# Patient Record
Sex: FEMALE | Race: WHITE | Employment: FULL TIME | URBAN - METROPOLITAN AREA
[De-identification: names, ages, dates, MRNs, and addresses within clinical notes are randomized per-mention and may not be internally consistent; named-entity substitution may affect disease eponyms.]

---

## 2017-01-03 ENCOUNTER — ALLSCRIPTS OFFICE VISIT (OUTPATIENT)
Dept: OTHER | Facility: OTHER | Age: 52
End: 2017-01-03

## 2017-01-20 RX ORDER — TRANEXAMIC ACID 650 1/1
TABLET ORAL
COMMUNITY
End: 2018-12-03 | Stop reason: ALTCHOICE

## 2017-01-20 RX ORDER — CHOLECALCIFEROL (VITAMIN D3) 50 MCG
2000 TABLET ORAL DAILY
COMMUNITY
End: 2018-12-03 | Stop reason: ALTCHOICE

## 2017-01-30 ENCOUNTER — HOSPITAL ENCOUNTER (OUTPATIENT)
Facility: HOSPITAL | Age: 52
Setting detail: OUTPATIENT SURGERY
Discharge: HOME | End: 2017-01-30
Attending: SURGERY | Admitting: SURGERY
Payer: OTHER GOVERNMENT

## 2017-01-30 ENCOUNTER — ALLSCRIPTS OFFICE VISIT (OUTPATIENT)
Dept: OTHER | Facility: OTHER | Age: 52
End: 2017-01-30

## 2017-01-30 VITALS
DIASTOLIC BLOOD PRESSURE: 56 MMHG | SYSTOLIC BLOOD PRESSURE: 110 MMHG | RESPIRATION RATE: 16 BRPM | HEIGHT: 70 IN | HEART RATE: 58 BPM | OXYGEN SATURATION: 100 % | BODY MASS INDEX: 22.19 KG/M2 | WEIGHT: 155 LBS | TEMPERATURE: 98.9 F

## 2017-01-30 DIAGNOSIS — Z12.31 ENCOUNTER FOR SCREENING MAMMOGRAM FOR MALIGNANT NEOPLASM OF BREAST: ICD-10-CM

## 2017-01-30 DIAGNOSIS — L98.9 DISORDER OF SKIN OR SUBCUTANEOUS TISSUE: ICD-10-CM

## 2017-01-30 DIAGNOSIS — D25.9 LEIOMYOMA OF UTERUS: ICD-10-CM

## 2017-01-30 DIAGNOSIS — Z01.419 ENCOUNTER FOR GYNECOLOGICAL EXAMINATION WITHOUT ABNORMAL FINDING: ICD-10-CM

## 2017-01-30 PROCEDURE — 88305 TISSUE EXAM BY PATHOLOGIST: CPT | Performed by: SURGERY

## 2017-01-30 RX ORDER — NAPROXEN 500 MG/1
500 TABLET ORAL 2 TIMES DAILY WITH MEALS
Qty: 60 TABLET | Refills: 0 | Status: SHIPPED | OUTPATIENT
Start: 2017-01-30 | End: 2018-12-03 | Stop reason: ALTCHOICE

## 2017-01-30 RX ORDER — LIDOCAINE HYDROCHLORIDE AND EPINEPHRINE 10; 10 MG/ML; UG/ML
20 INJECTION, SOLUTION INFILTRATION; PERINEURAL ONCE
Status: COMPLETED | OUTPATIENT
Start: 2017-01-30 | End: 2017-01-30

## 2017-02-07 ENCOUNTER — HOSPITAL ENCOUNTER (OUTPATIENT)
Dept: ULTRASOUND IMAGING | Facility: CLINIC | Age: 52
Discharge: HOME | End: 2017-02-07
Payer: OTHER GOVERNMENT

## 2017-02-07 ENCOUNTER — HOSPITAL ENCOUNTER (OUTPATIENT)
Dept: MAMMOGRAPHY | Facility: CLINIC | Age: 52
Discharge: HOME | End: 2017-02-07
Payer: OTHER GOVERNMENT

## 2017-02-07 DIAGNOSIS — D25.9 LEIOMYOMA OF UTERUS: ICD-10-CM

## 2017-02-07 DIAGNOSIS — Z12.31 ENCOUNTER FOR SCREENING MAMMOGRAM FOR MALIGNANT NEOPLASM OF BREAST: ICD-10-CM

## 2017-02-07 DIAGNOSIS — Z01.419 ENCOUNTER FOR GYNECOLOGICAL EXAMINATION WITHOUT ABNORMAL FINDING: ICD-10-CM

## 2017-02-07 PROCEDURE — 77063 BREAST TOMOSYNTHESIS BI: CPT

## 2017-02-07 PROCEDURE — 76830 TRANSVAGINAL US NON-OB: CPT

## 2017-02-07 PROCEDURE — G0202 SCR MAMMO BI INCL CAD: HCPCS

## 2017-02-07 PROCEDURE — 76856 US EXAM PELVIC COMPLETE: CPT

## 2017-02-09 ENCOUNTER — ALLSCRIPTS OFFICE VISIT (OUTPATIENT)
Dept: OTHER | Facility: OTHER | Age: 52
End: 2017-02-09

## 2017-05-11 ENCOUNTER — ALLSCRIPTS OFFICE VISIT (OUTPATIENT)
Dept: OTHER | Facility: OTHER | Age: 52
End: 2017-05-11

## 2017-06-08 ENCOUNTER — GENERIC CONVERSION - ENCOUNTER (OUTPATIENT)
Dept: OTHER | Facility: OTHER | Age: 52
End: 2017-06-08

## 2017-10-06 DIAGNOSIS — Z11.59 ENCOUNTER FOR SCREENING FOR OTHER VIRAL DISEASES (CODE): ICD-10-CM

## 2017-10-20 ENCOUNTER — APPOINTMENT (OUTPATIENT)
Dept: LAB | Facility: CLINIC | Age: 52
End: 2017-10-20
Payer: OTHER GOVERNMENT

## 2017-10-20 DIAGNOSIS — Z11.59 ENCOUNTER FOR SCREENING FOR OTHER VIRAL DISEASES (CODE): ICD-10-CM

## 2017-10-20 DIAGNOSIS — Z00.00 ENCOUNTER FOR GENERAL ADULT MEDICAL EXAMINATION WITHOUT ABNORMAL FINDINGS: ICD-10-CM

## 2017-10-20 LAB
BASOPHILS # BLD AUTO: 0.03 THOUSANDS/ΜL (ref 0–0.1)
BASOPHILS NFR BLD AUTO: 1 % (ref 0–1)
BILIRUB UR QL STRIP: NEGATIVE
CLARITY UR: CLEAR
COLOR UR: YELLOW
EOSINOPHIL # BLD AUTO: 0.11 THOUSAND/ΜL (ref 0–0.61)
EOSINOPHIL NFR BLD AUTO: 3 % (ref 0–6)
ERYTHROCYTE [DISTWIDTH] IN BLOOD BY AUTOMATED COUNT: 13.4 % (ref 11.6–15.1)
GLUCOSE UR STRIP-MCNC: NEGATIVE MG/DL
HCT VFR BLD AUTO: 37.6 % (ref 34.8–46.1)
HGB BLD-MCNC: 12.7 G/DL (ref 11.5–15.4)
HGB UR QL STRIP.AUTO: NEGATIVE
KETONES UR STRIP-MCNC: NEGATIVE MG/DL
LEUKOCYTE ESTERASE UR QL STRIP: NEGATIVE
LYMPHOCYTES # BLD AUTO: 0.81 THOUSANDS/ΜL (ref 0.6–4.47)
LYMPHOCYTES NFR BLD AUTO: 22 % (ref 14–44)
MCH RBC QN AUTO: 32.8 PG (ref 26.8–34.3)
MCHC RBC AUTO-ENTMCNC: 33.8 G/DL (ref 31.4–37.4)
MCV RBC AUTO: 97 FL (ref 82–98)
MONOCYTES # BLD AUTO: 0.3 THOUSAND/ΜL (ref 0.17–1.22)
MONOCYTES NFR BLD AUTO: 8 % (ref 4–12)
NEUTROPHILS # BLD AUTO: 2.35 THOUSANDS/ΜL (ref 1.85–7.62)
NEUTS SEG NFR BLD AUTO: 66 % (ref 43–75)
NITRITE UR QL STRIP: NEGATIVE
NRBC BLD AUTO-RTO: 0 /100 WBCS
PH UR STRIP.AUTO: 6.5 [PH] (ref 4.5–8)
PLATELET # BLD AUTO: 186 THOUSANDS/UL (ref 149–390)
PMV BLD AUTO: 10.9 FL (ref 8.9–12.7)
PROT UR STRIP-MCNC: NEGATIVE MG/DL
RBC # BLD AUTO: 3.87 MILLION/UL (ref 3.81–5.12)
SP GR UR STRIP.AUTO: 1 (ref 1–1.03)
UROBILINOGEN UR QL STRIP.AUTO: 0.2 E.U./DL
WBC # BLD AUTO: 3.61 THOUSAND/UL (ref 4.31–10.16)

## 2017-10-20 PROCEDURE — 82728 ASSAY OF FERRITIN: CPT

## 2017-10-20 PROCEDURE — 80053 COMPREHEN METABOLIC PANEL: CPT

## 2017-10-20 PROCEDURE — 80061 LIPID PANEL: CPT

## 2017-10-20 PROCEDURE — 84443 ASSAY THYROID STIM HORMONE: CPT

## 2017-10-20 PROCEDURE — 86803 HEPATITIS C AB TEST: CPT

## 2017-10-20 PROCEDURE — 83540 ASSAY OF IRON: CPT

## 2017-10-20 PROCEDURE — 81003 URINALYSIS AUTO W/O SCOPE: CPT

## 2017-10-20 PROCEDURE — 36415 COLL VENOUS BLD VENIPUNCTURE: CPT

## 2017-10-20 PROCEDURE — 85025 COMPLETE CBC W/AUTO DIFF WBC: CPT

## 2017-10-21 LAB
ALBUMIN SERPL BCP-MCNC: 3.7 G/DL (ref 3.5–5)
ALP SERPL-CCNC: 49 U/L (ref 46–116)
ALT SERPL W P-5'-P-CCNC: 21 U/L (ref 12–78)
ANION GAP SERPL CALCULATED.3IONS-SCNC: 7 MMOL/L (ref 4–13)
AST SERPL W P-5'-P-CCNC: 27 U/L (ref 5–45)
BILIRUB SERPL-MCNC: 0.27 MG/DL (ref 0.2–1)
BUN SERPL-MCNC: 7 MG/DL (ref 5–25)
CALCIUM SERPL-MCNC: 8.9 MG/DL (ref 8.3–10.1)
CHLORIDE SERPL-SCNC: 107 MMOL/L (ref 100–108)
CHOLEST SERPL-MCNC: 118 MG/DL (ref 50–200)
CO2 SERPL-SCNC: 28 MMOL/L (ref 21–32)
CREAT SERPL-MCNC: 0.73 MG/DL (ref 0.6–1.3)
FERRITIN SERPL-MCNC: 26 NG/ML (ref 8–388)
GFR SERPL CREATININE-BSD FRML MDRD: 94 ML/MIN/1.73SQ M
GLUCOSE P FAST SERPL-MCNC: 78 MG/DL (ref 65–99)
HDLC SERPL-MCNC: 43 MG/DL (ref 40–60)
IRON SERPL-MCNC: 132 UG/DL (ref 50–170)
LDLC SERPL CALC-MCNC: 69 MG/DL (ref 0–100)
POTASSIUM SERPL-SCNC: 4.2 MMOL/L (ref 3.5–5.3)
PROT SERPL-MCNC: 6.7 G/DL (ref 6.4–8.2)
SODIUM SERPL-SCNC: 142 MMOL/L (ref 136–145)
TRIGL SERPL-MCNC: 31 MG/DL
TSH SERPL DL<=0.05 MIU/L-ACNC: 1.31 UIU/ML (ref 0.36–3.74)

## 2017-10-22 LAB — HCV AB SER QL: NORMAL

## 2017-10-23 ENCOUNTER — ALLSCRIPTS OFFICE VISIT (OUTPATIENT)
Dept: OTHER | Facility: OTHER | Age: 52
End: 2017-10-23

## 2018-01-11 NOTE — PROGRESS NOTES
Assessment    1  Encounter for preventive health examination (V70 0) (Z00 00)    Plan  Health Maintenance    · Call [phone] if: You find a new or different kind of lump in your breast ;  Status:Complete;   Done: 84OAT3306   · Call [phone] if: You have any bleeding from the vagina ; Status:Complete;    Done: 16ARC9091   · Call [phone] if: You have any warning signs of skin cancer ; Status:Complete;    Done: 85CKT8657   · Call [phone] if: You experience a new kind of chest pain (angina) or pressure ;  Status:Complete;   Done: 26PXC6397   · Always use a seat belt and shoulder strap when riding or driving a motor vehicle ;  Status:Complete;   Done: 38STB8635   · Begin a limited exercise program ; Status:Complete;   Done: 93BPR9672   · Begin or continue regular aerobic exercise  Gradually work up to at least 3 sessions of 30  minutes of exercise a week ; Status:Complete;   Done: 94MAE2935   · Decreasing the stress in your life may help your condition improve ; Status:Complete;    Done: 23Oct2017   · Eat a low fat and low cholesterol diet ; Status:Complete;   Done: 94SSX2724   · Regular aerobic exercise can help reduce stress ; Status:Complete;   Done: 47QRX3463   · Stretch and warm up your muscles during the first 10 minutes , then cool down your  muscles for the last 10 minutes of exercise ; Status:Complete;   Done: 44QLY4575   · Use a sun block product with an SPF of 15 or more ; Status:Complete;   Done:  73TDZ8477   · We encourage all of our patients to exercise regularly  30 minutes of exercise or physical  activity five or more days a week is recommended for children and adults ;  Status:Complete;   Done: 23Oct2017   · We recommend routine visits to a dentist ; Status:Complete;   Done: 37HXM8260   · We recommend that you follow these steps to lower your risk of osteoporosis  ;  Status:Complete;   Done: 72CLH3872   · Follow-up visit in 1 year Evaluation and Treatment  Follow-up  Status: Complete Done:  87DKZ0460  PMH: History of basal cell carcinoma, Multiple nevi    · 2 - Loly AVENDANO, Nessa Reilly (Dermatology) Co-Management  *  Status: Active  Requested  for: 79HXX3007  Care Summary provided  : Yes  Screening for colon cancer    · (1) Jennifer Patrick; Status:Active; Requested LOC:82OMC3197;   cont  : I authorize [address] to obtain reimbursement for      Cologuard and to directly contact and collect a second sample from the paient      if reportable results are not obtained from the initial sample   cont  : I accept responsibility for maintaining the privacy of test results and      related information as required by HIPAA   : By ordering Cologuard, I certify that I am a licensed medical professional      authorized to order Cologuard  I acknowledge that the test is medically necessary and      that the patient is eligible to use Cologuard  · COLONOSCOPY; Status:Active; Requested EIM:86AHY5395;     Discussion/Summary  health maintenance visit Currently, she eats a healthy diet and has an adequate exercise regimen  the risks and benefits of cervical cancer screening were discussed cervical cancer screening is current Breast cancer screening: the risks and benefits of breast cancer screening were discussed and mammogram has been ordered  Colorectal cancer screening: the risks and benefits of colorectal cancer screening were discussed and colonoscopy has been ordered  Osteoporosis screening: the risks and benefits of osteoporosis screening were discussed and bone mineral density testing is current  She was advised to be evaluated by an optometrist and a dentist  Advice and education were given regarding nutrition, aerobic exercise, weight bearing exercise, weight loss, vitamin D supplements, cardiovascular risk reduction, sunscreen use, self skin examination, seat belt use and advanced directive planning  Nayely Sportsman had a normal exam today  She will continue with her healthy diet and exercise   Her lab work looked very good  She is encouraged schedule her colonoscopy in the near future  We will see her back in the office as scheduled  Possible side effects of new medications were reviewed with the patient/guardian today  The treatment plan was reviewed with the patient/guardian  The patient/guardian understands and agrees with the treatment plan      Chief Complaint  Complete physical 47year old  History of Present Illness  HM, Adult Female: The patient is being seen for a health maintenance evaluation  General Health:   Screening:   HPI: Silvia Charles is a 77-year-old female who presents today for a complete physical  She did have a recent excision of a basal cell carcinoma from her scalp in May 2017  The specimen was seen to be jagged on exam in the Pathology lab- he advised her to monitor it  She is trying to establish with a dermatologist    She has been feeling well  There is no acute illnesses  The patient denies any chest pain, shortness of breath, or palpitations  There is no edema  There are no headaches or visual changes  There is no lightheadedness, dizziness, or fainting spells  There are no GERD symptoms  She sees Dr Sudeep Vogel for her GYN exams and is UTD  She is still having heavy periods  There are no  symptoms  She has had leg cramps on and off for years  They are a little better  She has not had a colonoscopy done- she is considering this  Review of Systems    Constitutional: as noted in HPI  Eyes: as noted in HPI    ENT: as noted in HPI  Cardiovascular: as noted in HPI  Respiratory: as noted in HPI  Gastrointestinal: as noted in HPI  Genitourinary: as noted in HPI  Musculoskeletal: as noted in HPI  Active Problems    1  Anemia (285 9) (D64 9)   2  Cyst of left ovary (620 2) (N83 202)   3  Dense breasts (793 82) (R92 2)   4  Fibroids (218 9) (D25 9)   5  Leukopenia (288 50) (D72 819)   6   Loss of height (781 91) (R29 890)    Past Medical History · History of Acute urinary tract infection (599 0) (N39 0)   · History of Atypical nevus of scalp (216 4) (D22 4)   · History of Basal cell carcinoma of scalp (173 41) (C44 41)   · History of Cervical polyp (622 7) (N84 1)   · History of  0 (V49 89)   · History of basal cell carcinoma (V10 83) (Q38 530)   · History of breast lump (V13 89) (E83 829)   · History of menorrhagia (V13 29) (Z87 42)   · History of metrorrhagia (V13 29) (Z87 42)   · History of Lump (782 2) (R22 9)   · Need for Tdap vaccination (V06 1) (Z23)   · History of Right ankle pain (719 47) (M25 571)   · History of Right ankle sprain (845 00) (S93 401A)   · Screening for colon cancer (V76 51) (Z12 11)   · Screening for colon cancer (V76 51) (Z12 11)    Surgical History    · History of Biopsy Skin   · History of Bx Breast Percutan Needle Core Use Imag Guide (Stereotactic)    Family History  Mother    · Family history of Breast Cancer (V16 3)  Maternal Grandmother    · Family history of Heart Disease (V17 49)  Paternal Grandmother    · Family history of Heart Disease (V17 49)  Paternal Grandfather    · Family history of Heart Disease (V17 49)  Family History    · Family history of Family Health Status Of Father -    · Family history of Family Health Status Of Mother -     Social History    · Being A Social Drinker   · Caffeine Use   · Denied: History of Drug Use   · Marital History - Currently    · Never a smoker   · No drug use   · Occupation   ·    · [address] ([employer]   · Uses Safety Equipment - Seatbelts    Current Meds   1  Iron Supplement TABS; Therapy: (Recorded:12Nah8210) to Recorded   2  Vitamin D3 1000 UNIT Oral Tablet; TAKE 1 TABLET DAILY; Therapy: (Recorded:2017) to Recorded    Allergies    1  No Known Drug Allergies    2  No Known Environmental Allergies   3   No Known Food Allergies    Vitals   Recorded: 11IZR4305 12:55PM   Heart Rate 64   Systolic 110, RUE, Sitting   Diastolic 70, RUE, Sitting   Height 5 ft 10 in   Weight 153 lb    BMI Calculated 21 95   BSA Calculated 1 86     Physical Exam    Constitutional   General appearance: No acute distress, well appearing and well nourished  Eyes   Conjunctiva and lids: No swelling, erythema or discharge  Pupils and irises: Equal, round, reactive to light  Ophthalmoscopic examination: Normal fundi and optic discs  Ears, Nose, Mouth, and Throat   External inspection of ears and nose: Normal     Otoscopic examination: Tympanic membranes translucent with normal light reflex  Canals patent without erythema  Hearing: Normal     Nasal mucosa, septum, and turbinates: Normal without edema or erythema  Lips, teeth, and gums: Normal, good dentition  Oropharynx: Normal with no erythema, edema, exudate or lesions  Neck   Neck: Supple, symmetric, trachea midline, no masses  Thyroid: Normal, no thyromegaly  Pulmonary   Respiratory effort: No increased work of breathing or signs of respiratory distress  Percussion of chest: Normal     Palpation of chest: Normal     Auscultation of lungs: Clear to auscultation  Auscultation of the lungs revealed no expiratory wheezing, normal expiratory time and no inspiratory wheezing  no rales or crackles were heard bilaterally  no rhonchi  no friction rub  no wheezing  no diminished breath sounds  no bronchial breath sounds  Cardiovascular   Palpation of heart: Normal PMI, no thrills  Auscultation of heart: Normal rate and rhythm, normal S1 and S2, no murmurs  The heart rate was normal  Heart sounds: normal S1, normal S2, no S3 and no S4  no murmurs were heard  Carotid pulses: 2+ bilaterally  Abdominal aorta: Normal     Femoral pulses: 2+ bilaterally  Pedal pulses: 2+ bilaterally  Examination of extremities for edema and/or varicosities: Normal     Chest   Breasts: Normal, no dimpling or skin changes appreciated      Palpation of breasts and axillae: Normal, no masses palpated  Abdomen   Abdomen: Non-tender, no masses  Liver and spleen: No hepatomegaly or splenomegaly  Examination for hernias: No hernia appreciated  Lymphatic   Palpation of lymph nodes in neck: No lymphadenopathy  Palpation of lymph nodes in axillae: No lymphadenopathy  Palpation of lymph nodes in groin: No lymphadenopathy  Palpation of lymph nodes in other areas: No lymphadenopathy  Musculoskeletal   Gait and station: Normal     Digits and nails: Normal without clubbing or cyanosis  Joints, bones, and muscles: Normal     Range of motion: Normal     Stability: Normal     Muscle strength/tone: Normal     Skin   Skin and subcutaneous tissue: Normal without rashes or lesions  Palpation of skin and subcutaneous tissue: Normal turgor  Neurologic   Cranial nerves: Cranial nerves II-XII intact  Reflexes: 2+ and symmetric  Sensation: No sensory loss  Psychiatric   Judgment and insight: Normal     Orientation to person, place, and time: Normal     Recent and remote memory: Intact  Mood and affect: Normal        Results/Data  (1) CBC/PLT/DIFF 20Oct2017 10:00AM [facility]    Order Number: [id number]     Test Name Result Flag Reference   WBC COUNT 3 61 Thousand/uL L 4 31-10 16   RBC COUNT 3 87 Million/uL  3 81-5 12   HEMOGLOBIN 12 7 g/dL  11 5-15 4   HEMATOCRIT 37 6 %  34 8-46  1   MCV 97 fL  82-98   MCH 32 8 pg  26 8-34 3   MCHC 33 8 g/dL  31 4-37 4   RDW 13 4 %  11 6-15 1   MPV 10 9 fL  8 9-12 7   PLATELET COUNT 310 Thousands/uL  149-390   nRBC AUTOMATED 0 /100 WBCs     NEUTROPHILS RELATIVE PERCENT 66 %  43-75   LYMPHOCYTES RELATIVE PERCENT 22 %  14-44   MONOCYTES RELATIVE PERCENT 8 %  4-12   EOSINOPHILS RELATIVE PERCENT 3 %  0-6   BASOPHILS RELATIVE PERCENT 1 %  0-1   NEUTROPHILS ABSOLUTE COUNT 2 35 Thousands/? ??L  1 85-7 62   LYMPHOCYTES ABSOLUTE COUNT 0 81 Thousands/? ??L  0 60-4 47   MONOCYTES ABSOLUTE COUNT 0 30 Thousand/? ??L  0 17-1 22 EOSINOPHILS ABSOLUTE COUNT 0 11 Thousand/? ??L  0 00-0 61   BASOPHILS ABSOLUTE COUNT 0 03 Thousands/? ??L  0 00-0 10     (1) COMPREHENSIVE METABOLIC PANEL 01FIY1236 71:51RF [facility] Order Number: [id number]     Test Name Result Flag Reference   SODIUM 142 mmol/L  136-145   POTASSIUM 4 2 mmol/L  3 5-5 3   Slightly Hemolyzed; Results May be Affected&XA&Slightly Hemolyzed; Results May be Affected&XA&Slightly Hemolyzed; Results May be Affected   CHLORIDE 107 mmol/L  100-108   CARBON DIOXIDE 28 mmol/L  21-32   ANION GAP (CALC) 7 mmol/L  4-13   BLOOD UREA NITROGEN 7 mg/dL  5-25   CREATININE 0 73 mg/dL  0 60-1 30   Standardized to IDMS reference method   CALCIUM 8 9 mg/dL  8 3-10 1   BILI, TOTAL 0 27 mg/dL  0 20-1 00   ALK PHOSPHATAS 49 U/L     ALT (SGPT) 21 U/L  12-78   Specimen collection should occur prior to Sulfasalazine and/or Sulfapyridine administration due to the potential for falsely depressed results  AST(SGOT) 27 U/L  5-45   Slightly Hemolyzed; Results May be Affected&XA&Slightly Hemolyzed; Results May be Affected&XA&Slightly Hemolyzed; Results May be Affected  Specimen collection should occur prior to Sulfasalazine administration due to the potential for falsely depressed results  ALBUMIN 3 7 g/dL  3 5-5 0   TOTAL PROTEIN 6 7 g/dL  6 4-8 2   eGFR 94 ml/min/1 73sq m     National Kidney Disease Education Program recommendations are as follows:  GFR calculation is accurate only with a steady state creatinine  Chronic Kidney disease less than 60 ml/min/1 73 sq  meters  Kidney failure less than 15 ml/min/1 73 sq  meters  GLUCOSE FASTING 78 mg/dL  65-99   Specimen collection should occur prior to Sulfasalazine administration due to the potential for falsely depressed results  Specimen collection should occur prior to Sulfapyridine administration due to the potential for falsely elevated results       (1) IRON 32IEV3434 10:00AM [facility] Order Number: [id number]     Test Name Result Flag Reference   IRON 132 ug/dL     Slightly Hemolyzed; Results May be Affected&XA&Slightly Hemolyzed; Results May be Affected&XA&Slightly Hemolyzed; Results May be Affected  Patients treated with metal-binding drugs (ie  Deferoxamine) may have depressed iron values  (1) FERRITIN 65VHT5274 10:00AM [facility] Order Number: [id number]     Test Name Result Flag Reference   FERRITIN 26 ng/mL  8-388     (1) LIPID PANEL, FASTING 05ZEB6088 10:00AM [facility] Order Number: [id number]     Test Name Result Flag Reference   CHOLESTEROL 118 mg/dL     HDL,DIRECT 43 mg/dL  40-60   Specimen collection should occur prior to Metamizole administration due to the potential for falsley depressed results  LDL CHOLESTEROL CALCULATED 69 mg/dL  0-100   Triglyceride:        Normal <150 mg/dl   Borderline High 150-199 mg/dl   High 200-499 mg/dl   Very High >499 mg/dl      Cholesterol:       Desirable <200 mg/dl    Borderline High 200-239 mg/dl    High >239 mg/dl      HDL Cholesterol:       High>59 mg/dL    Low <41 mg/dL      This screening LDL is a calculated result  It does not have the accuracy of the Direct Measured LDL in the monitoring of patients with hyperlipidemia and/or statin therapy  Direct Measure LDL (HUK927) must be ordered separately in these patients  TRIGLYCERIDES 31 mg/dL  <=150   Specimen collection should occur prior to N-Acetylcysteine or Metamizole administration due to the potential for falsely depressed results  (1) TSH 20Oct2017 10:00AM [facility] Order Number: [id number]     Test Name Result Flag Reference   TSH 1 310 uIU/mL  0 358-3 740   Patients undergoing fluorescein dye angiography may retain small amounts of fluorescein in the body for 48-72 hours post procedure  Samples containing fluorescein can produce falsely depressed TSH values   If the patient had this procedure,a specimen should be resubmitted post fluorescein clearance  The recommended reference ranges for TSH during pregnancy are as follows:  First trimester 0 1 to 2 5 uIU/mL  Second trimester  0 2 to 3 0 uIU/mL  Third trimester 0 3 to 3 0 uIU/m     (1) URINALYSIS w URINE C/S REFLEX (will reflex a microscopy if leukocytes, occult blood, or nitrites are not within normal limits) 20Oct2017 10:00AM [facility]   [facility] Order Number: [id number]     Test Name Result Flag Reference   COLOR Yellow     CLARITY Clear     PH UA 6 5  4 5-8 0   LEUKOCYTE ESTERASE UA Negative  Negative   NITRITE UA Negative  Negative   PROTEIN UA Negative mg/dl  Negative   GLUCOSE UA Negative mg/dl  Negative   KETONES UA Negative mg/dl  Negative   UROBILINOGEN UA 0 2 E U /dl  0 2, 1 0 E U /dl   BILIRUBIN UA Negative  Negative   BLOOD UA Negative  Negative   SPECIFIC GRAVITY UA 1 004  1 003-1 030     (1) HEP C ANTIBODY 20Oct2017 10:00AM [facility]   [facility] Order Number: [id number]     Test Name Result Flag Reference   HEPATITIS C ANTIBODY Non-reactive  Non-reactive       Procedure    Procedure: Visual Acuity Test    Indication: routine screening  Inforrmation supplied by a Snellen chart  Results: 20/30 in the right eye with corrective device, 20/30 in the left eye with corrective device Wearing contacts, sees eye doctor  The patient was cooperative        Signatures   Electronically signed by : Vivienne Genao DO; Oct 24 2017  5:49AM EST                       (Author)

## 2018-01-12 VITALS — HEIGHT: 70 IN | BODY MASS INDEX: 22.19 KG/M2 | WEIGHT: 155 LBS

## 2018-01-12 NOTE — MISCELLANEOUS
Message   Recorded as Task   Date: 12/19/2016 03:18 PM, Created By: Gilberto Blandon   Task Name: Care Coordination   Assigned To: Chyna Ramonme   Regarding Patient: Gricel Corona, Status: In Progress   Comment:    Gali Apple - 19 Dec 2016 3:18 PM     TASK CREATED  pt cx her appt with you today because she was bleeding heavy    she is using the Lysteda, but feels that it is not really helping    pt rescheduled for the end of January    she said you gave her something before to stop her bleeding so she could get an exam  Provera?,,,   Chyna Huitron - 19 Dec 2016 3:24 PM     TASK REPLIED TO: Previously Assigned To Chyna Huitron   yes, it was provera, 10mg she can take it two weeks on and two weeks off or for 21 days with one 1 week off, also she could take progesterone only pills if she prefers  She needs a repeat US and did she reschedule her yearly? Gali Apple - 20 Dec 2016 9:17 AM     TASK IN PROGRESS   Gali Apple - 20 Dec 2016 2:04 PM     TASK REPLIED TO: Previously Assigned To Gali Apple  pt wants provera for 2 weeks on and 2 weeks off    she is coming in at the end of the month           Active Problems    1  Anemia (285 9) (D64 9)   2  Atypical nevus of scalp (216 4) (D22 4)   3  Breast disorder (611 9) (N64 9)   4  Breast mass (611 72) (N63)   5  Cervical polyp (622 7) (N84 1)   6  Cyst of left ovary (620 2) (N83 202)   7  Dense breasts (793 82) (R92 2)   8  Fibroids (218 9) (D25 9)   9  Lesion of skin of scalp (709 9) (L98 9)   10  Leukopenia (288 50) (D72 819)   11  Loss of height (781 91) (R29 890)   12  Menorrhagia (626 2) (N92 0)   13  Metrorrhagia (626 6) (N92 1)    Current Meds   1  Iron Supplement TABS; Therapy: (Recorded:39Mhb6900) to Recorded   2  Tranexamic Acid 650 MG Oral Tablet (Lysteda); Two tabs TID during the heavy days, max   5 days; Therapy: 06EWC9994 to (Evaluate:28Lbn8946)  Requested for: 71HLW8298; Last   Rx:11Lin6273 Ordered   3   Vitamin D3 2000 UNIT Oral Capsule; TAKE 1 CAPSULE BY MOUTH DAILY Recorded    Allergies    1  No Known Drug Allergies    2  No Known Environmental Allergies   3  No Known Food Allergies    Plan  Menorrhagia    · MedroxyPROGESTERone Acetate 10 MG Oral Tablet;  Take one daily for 14 days    Signatures   Electronically signed by : Forrest James, ; Dec 20 2016  2:04PM EST                       (Author)

## 2018-01-13 VITALS
DIASTOLIC BLOOD PRESSURE: 62 MMHG | SYSTOLIC BLOOD PRESSURE: 110 MMHG | HEIGHT: 69 IN | WEIGHT: 154.25 LBS | BODY MASS INDEX: 22.85 KG/M2

## 2018-01-13 NOTE — MISCELLANEOUS
Message   Recorded as Task   Date: 05/09/2016 12:59 PM, Created By: Greg Novak   Task Name: Follow Up   Assigned To: Luis A Pettit   Regarding Patient: Tiki Brunson, Status: In Progress   Comment:    Natalia Jones - 09 May 2016 12:59 PM     TASK CREATED  Call the patient about her DEXA  Natalia Jones - 13 May 2016 7:45 PM     TASK EDITED   Greg Novak - 16 May 2016 7:01 PM     TASK EDITED   Greg Novak - 20 May 2016 7:25 PM     TASK EDITED   Greg Novak - 26 May 2016 8:02 PM     TASK REASSIGNED: Previously Assigned To Natalia Jones  Please let the patient know that her DEXA scan demonstrated mild osteopenia  It is not at the level were really consider treatment  There is no evidence of osteoporosis  It just means there is some loss of bone density but nothing severe  She should continue with her regular diet and weightbearing exercise  She should eat calcium rich foods  I would suggest adding on vitamin D 1000 units daily  We'll plan on repeating a DEXA in 2 years  Karl Marchi - 27 May 2016 4:13 PM     TASK REASSIGNED: Previously Assigned To Karllen Marchi 05/27/2016  She maybe calling back on Tuesday  trvalentin   Rajesh,April - 31 May 2016 9:24 AM     TASK EDITED  left message to call office back on home number   Mena,April - 31 May 2016 9:24 AM     TASK IN PROGRESS   RajeshApril - 31 May 2016 10:19 AM     TASK EDITED  patient was informed and advised  Active Problems    1  Achilles tendon mass (727 89) (M67 979)   2  Anemia (285 9) (D64 9)   3  Breast disorder (611 9) (N64 9)   4  Breast mass (611 72) (N63)   5  Cervical polyp (622 7) (N84 1)   6  Cyst of left ovary (620 2) (N83 20)   7  Dense breasts (793 82) (R92 2)   8  Fibroids (218 9) (D25 9)   9  Leukopenia (288 50) (D72 819)   10  Loss of height (781 91) (R29 890)   11  Menorrhagia (626 2) (N92 0)   12  Metrorrhagia (626 6) (N92 1)   13  Not vaccinated against influenza (V04 81) (Z91 89)   14   Screening for osteoporosis (V82 81) (C80 038)    Current Meds   1  Biotin 5000 MCG Oral Capsule; TAKE 1 CAPSULE Daily Recorded   2  Iron Supplement TABS; Therapy: (Recorded:30Sep2013) to Recorded   3  Tranexamic Acid 650 MG Oral Tablet (Lysteda); Two tabs TID during the heavy days, max   5 days; Therapy: 53QWK5441 to (Evaluate:18Jan2016)  Requested for: 68Ool3484; Last   Rx:44Chz3077 Ordered   4  Vitamin D3 2000 UNIT Oral Capsule; TAKE 1 CAPSULE BY MOUTH DAILY Recorded    Allergies    1  No Known Drug Allergies    2  No Known Environmental Allergies   3   No Known Food Allergies    Signatures   Electronically signed by : Luanne Mena, ; May 31 2016 10:19AM EST                       (Author)

## 2018-01-15 VITALS
DIASTOLIC BLOOD PRESSURE: 70 MMHG | HEIGHT: 70 IN | HEART RATE: 64 BPM | WEIGHT: 153 LBS | BODY MASS INDEX: 21.9 KG/M2 | SYSTOLIC BLOOD PRESSURE: 110 MMHG

## 2018-01-15 NOTE — PROGRESS NOTES
Assessment    1  Encounter for preventive health examination (V70 0) (Z00 00)    Plan  Anemia    · Ferrous Sulfate 325 (65 Fe) MG Oral Tablet Delayed Release  Health Maintenance    · Follow-up visit in 1 year Evaluation and Treatment  Follow-up  Status: Hold For -  Scheduling  Requested for: 69MYW4559   · Always use a seat belt and shoulder strap when riding or driving a motor vehicle ;  Status:Complete;   Done: 98GQD1995   · Begin a limited exercise program ; Status:Complete;   Done: 77NGG2660   · Begin or continue regular aerobic exercise  Gradually work up to at least 3 sessions of 30  minutes of exercise a week ; Status:Complete;   Done: 39BMO5115   · Decreasing the stress in your life may help your condition improve ; Status:Complete;    Done: 48DZL3382   · Eat a low fat and low cholesterol diet ; Status:Complete;   Done: 90CXT2905   · Regular aerobic exercise can help reduce stress ; Status:Complete;   Done: 33BDJ1155   · Stretch and warm up your muscles during the first 10 minutes , then cool down your  muscles for the last 10 minutes of exercise ; Status:Complete;   Done: 89KHK4639   · Use a sun block product with an SPF of 15 or more ; Status:Complete;   Done:  13BPH7615   · We encourage all of our patients to exercise regularly  30 minutes of exercise or physical  activity five or more days a week is recommended for children and adults ;  Status:Complete;   Done: 97MIU3074   · We recommend a colonoscopy ; Status:Complete;   Done: 58ZGA0714   · We recommend routine visits to a dentist ; Status:Complete;   Done: 69IMN8023   · We recommend that you follow these steps to lower your risk of osteoporosis  ;  Status:Complete;   Done: 40EAN6598   · Call [phone] if: You find a new or different kind of lump in your breast ;  Status:Complete;   Done: 15DQG7671   · Call [phone] if: You have any bleeding from the vagina ; Status:Complete;    Done: 24TAO7571   · Call [phone] if:  You have any warning signs of skin cancer ; Status:Complete;    Done: 60GVW7248   · Call 911 if: You experience a new kind of chest pain (angina) or pressure ;  Status:Complete;   Done: 02VRJ0443  Loss of height, Screening for osteoporosis    · (1) CALCIUM IONIZED; Status:Active; Requested for:02May2016;    · (1) PTH N-TERMINAL (INTACT); Status:Active; Requested for:02May2016;    · (1) VITAMIN D 25-HYDROXY; Status:Active; Requested for:02May2016;    · * DXA BONE DENSITY SPINE HIP AND PELVIS; Status:Hold For - Scheduling;  Requested for:02May2016;   Need for Tdap vaccination    · Stop: Adacel 5-2-15 5 LF-MCG/0 5 Intramuscular Suspension  Not vaccinated against influenza    · Stop: Fluzone Quadrivalent Intramuscular Suspension  Not vaccinated against influenza, Screening for colon cancer    · COLONOSCOPY; Status:Active; Requested for:02May2016; Unlinked    · Multivitamins CAPS    Discussion/Summary  health maintenance visit Currently, she eats a healthy diet and has an adequate exercise regimen  the risks and benefits of cervical cancer screening were discussed cervical cancer screening is current cervical cancer screening is managed by Dr Sandra Reyes Breast cancer screening: the risks and benefits of breast cancer screening were discussed, self breast exam technique was taught, monthly self breast exam was advised, mammogram is needed every year and breast cancer screening is managed by Dr Sandra Reyes  Colorectal cancer screening: the risks and benefits of colorectal cancer screening were discussed and colonoscopy has been ordered  Osteoporosis screening: the risks and benefits of osteoporosis screening were discussed and bone mineral density testing has been ordered  The risks and benefits of immunizations were discussed and patient declines immunizations   She was advised to be evaluated by an optometrist and a dentist  Advice and education were given regarding nutrition, aerobic exercise, weight bearing exercise, vitamin D supplements, cardiovascular risk reduction, sunscreen use, self skin examination, helmet use, seat belt use and fall risk reduction  Patient discussion: discussed with the patient  [employer] normal exam in the office today  She'll continue with her healthy diet and exercise  She did have some loss of her height, so I will send her for a DEXA scan and lab work as ordered to further evaluate this  She'll increase her exercise  She'll continue to watch her diet  She'll follow-up with hematology as scheduled  I strongly encouraged her to schedule her colonoscopy in the near future since she is due  We'll plan on seeing her back again in year for physical  We will follow-up with her results  Chief Complaint  Complete physical 45year old, needs Colonoscopy and a depression screen  History of Present Illness  HM, Adult Female: The patient is being seen for a health maintenance evaluation  General Health: She has regular dental visits  She denies vision problems  Vision care includes wearing soft contact lenses  She denies hearing loss  Lifestyle:  She consumes a diverse and healthy diet  She does not have any weight concerns  She does not exercise regularly  She does not use tobacco  The patient has never smoked cigarettes  She denies alcohol use  She denies drug use  Reproductive health: the patient is premenopausal    Screening: cancer screening reviewed and updated  metabolic screening reviewed and current  risk screening reviewed and current  HPI: Joy Shirley is a 44 y/o female who presents for a complete physical today  She recently saw Dr Yvrose Lomax last week for her anemia  She is UTD with her GYN exams-she sees Dr Becki Schmid  She was started on Lysteda for her heavy menses and that seems to be helping somewhat, but they still are irregular  There are no recent illnesses  There is no chest pain or shortness of breath  There are no palpitations  There is no abdominal pain or nausea or vomiting   She needs to schedule a colonoscopy- she has the information for [facility]  There has been a slight loss of height over the last few months  Review of Systems    Constitutional: No fever, no chills, feels well, no tiredness, no recent weight gain or weight loss  Eyes: No complaints of eye pain, no red eyes, no eyesight problems, no discharge, no dry eyes, no itching of eyes  ENT: no complaints of earache, no loss of hearing, no nose bleeds, no nasal discharge, no sore throat, no hoarseness  Cardiovascular: No complaints of slow heart rate, no fast heart rate, no chest pain, no palpitations, no leg claudication, no lower extremity edema  Respiratory: No complaints of shortness of breath, no wheezing, no cough, no SOB on exertion, no orthopnea, no PND  Gastrointestinal: No complaints of abdominal pain, no constipation, no nausea or vomiting, no diarrhea, no bloody stools  Genitourinary: No complaints of dysuria, no incontinence, no pelvic pain, no dysmenorrhea, no vaginal discharge or bleeding  Musculoskeletal: No complaints of arthralgias, no myalgias, no joint swelling or stiffness, no limb pain or swelling  Integumentary: No complaints of skin rash or lesions, no itching, no skin wounds, no breast pain or lump  Neurological: No complaints of headache, no confusion, no convulsions, no numbness, no dizziness or fainting, no tingling, no limb weakness, no difficulty walking  Psychiatric: Not suicidal, no sleep disturbance, no anxiety or depression, no change in personality, no emotional problems  Endocrine: No complaints of proptosis, no hot flashes, no muscle weakness, no deepening of the voice, no feelings of weakness  Hematologic/Lymphatic: No complaints of swollen glands, no swollen glands in the neck, does not bleed easily, does not bruise easily  Active Problems    1  Achilles tendon mass (727 89) (M67 979)   2  Anemia (285 9) (D64 9)   3  Breast disorder (611 9) (N64 9)   4  Breast mass (611 72) (N63)   5  Cervical polyp (622 7) (N84 1)   6  Cyst of left ovary (620 2) (N83 20)   7  Dense breasts (793 82) (R92 2)   8  Fibroids (218 9) (D25 9)   9  Leukopenia (288 50) (D72 819)   10  Menorrhagia (626 2) (N92 0)   11  Metrorrhagia (626 6) (N92 1)    Past Medical History    · History of Acute urinary tract infection (599 0) (N39 0)   · History of  0 (V49 89) (Z78 9)   · History of Lump (782 2) (R22 9)   · History of Right ankle pain (719 47) (M25 571)   · History of Right ankle sprain (845 00) (S93 401A)   · Screening for colon cancer (V76 51) (Z12 11)    Surgical History    · History of Biopsy Skin   · History of Bx Breast Percutan Needle Core Use Imag Guide (Stereotactic)    Family History  Mother    · Family history of Breast Cancer (V16 3)  Maternal Grandmother    · Family history of Heart Disease (V17 49)  Paternal Grandmother    · Family history of Heart Disease (V17 49)  Paternal Grandfather    · Family history of Heart Disease (V17 49)  Family History    · Family history of Family Health Status Of Father -    · Family history of Family Health Status Of Mother -     Social History    · Being A Social Drinker   · Caffeine Use   · Denied: History of Drug Use   · Marital History - Currently    · Never A Smoker   · No drug use   · Occupation   ·    · [employer] [address] [employer]   · Uses Safety Equipment - Seatbelts    Current Meds   1  Biotin 5000 MCG Oral Capsule; TAKE 1 CAPSULE Daily Recorded   2  Ferrous Sulfate 325 (65 Fe) MG Oral Tablet Delayed Release; Take 1 tablet twice daily; Therapy: 99QRJ8085 to (Evaluate:83Ocj5129)  Requested for: 73EEJ3821; Last   Rx:2015 Ordered   3  Iron Supplement TABS; Therapy: (Francois Hidalgo) to Recorded   4  Multivitamins CAPS; Therapy: (Francois Hidalgo) to Recorded   5  Tranexamic Acid 650 MG Oral Tablet;  Two tabs TID during the heavy days, max 5 days; Therapy: 40SPG1812 to (Evaluate:63Rrd9994)  Requested for: 44Vjk7165; Last   Rx:90Qrt9336 Ordered   6  Vitamin D3 2000 UNIT Oral Capsule; TAKE 1 CAPSULE BY MOUTH DAILY Recorded    Allergies    1  No Known Drug Allergies    2  No Known Environmental Allergies   3  No Known Food Allergies    Immunizations   1 2    Hepatitis A  12NCV2187 40Bfi2439    Typhoid  37Cks9382     Yellow Fever  92Vlw3062      Vitals   Recorded: 68OBX4719 07:59AM   Heart Rate 64   Systolic 308, RUE, Sitting   Diastolic 64, RUE, Sitting   Height 5 ft 9 in   Weight 161 lb    BMI Calculated 23 78   BSA Calculated 1 88     Physical Exam    Constitutional   General appearance: No acute distress, well appearing and well nourished  Eyes   Conjunctiva and lids: No swelling, erythema or discharge  Pupils and irises: Equal, round, reactive to light  Ophthalmoscopic examination: Normal fundi and optic discs  Ears, Nose, Mouth, and Throat   External inspection of ears and nose: Normal     Otoscopic examination: Tympanic membranes translucent with normal light reflex  Canals patent without erythema  Hearing: Normal     Nasal mucosa, septum, and turbinates: Normal without edema or erythema  Lips, teeth, and gums: Normal, good dentition  Oropharynx: Normal with no erythema, edema, exudate or lesions  Neck   Neck: Supple, symmetric, trachea midline, no masses  Thyroid: Normal, no thyromegaly  Pulmonary   Respiratory effort: No increased work of breathing or signs of respiratory distress  Percussion of chest: Normal     Palpation of chest: Normal     Auscultation of lungs: Clear to auscultation  Auscultation of the lungs revealed no expiratory wheezing, normal expiratory time and no inspiratory wheezing  no rales or crackles were heard bilaterally  no rhonchi  no friction rub  no wheezing  no diminished breath sounds  no bronchial breath sounds  Cardiovascular   Palpation of heart: Normal PMI, no thrills      Auscultation of heart: Normal rate and rhythm, normal S1 and S2, no murmurs  The heart rate was normal  Heart sounds: normal S1, normal S2, no S3 and no S4  no murmurs were heard  Carotid pulses: 2+ bilaterally  Abdominal aorta: Normal     Femoral pulses: 2+ bilaterally  Pedal pulses: 2+ bilaterally  Examination of extremities for edema and/or varicosities: Normal     Chest   Breasts: Normal, no dimpling or skin changes appreciated  Palpation of breasts and axillae: Normal, no masses palpated  Abdomen   Abdomen: Non-tender, no masses  Liver and spleen: No hepatomegaly or splenomegaly  Examination for hernias: No hernia appreciated  Anus, perineum, and rectum: Normal sphincter tone, no masses, no prolapse  Stool sample for occult blood: Negative  Genitourinary   External genitalia and vagina: Normal, no lesions appreciated  Urethra: Normal, no discharge  Bladder: Not distended, no tenderness  Cervix: Normal, no lesions  Uterus: Normal size, no tenderness, no masses  Adnexa/Parametria: Normal, no masses or tenderness  Lymphatic   Palpation of lymph nodes in neck: No lymphadenopathy  Palpation of lymph nodes in axillae: No lymphadenopathy  Palpation of lymph nodes in groin: No lymphadenopathy  Palpation of lymph nodes in other areas: No lymphadenopathy  Musculoskeletal   Gait and station: Normal     Digits and nails: Normal without clubbing or cyanosis  Joints, bones, and muscles: Normal     Range of motion: Normal     Stability: Normal     Muscle strength/tone: Normal     Skin   Skin and subcutaneous tissue: Normal without rashes or lesions  Palpation of skin and subcutaneous tissue: Normal turgor  Neurologic   Cranial nerves: Cranial nerves II-XII intact  Reflexes: 2+ and symmetric  Sensation: No sensory loss  Psychiatric   Judgment and insight: Normal     Orientation to person, place, and time: Normal     Recent and remote memory: Intact      Mood and affect: Normal        Results/Data  PHQ-2 Adult Depression Screening 47HYB4847 08:29AM [place]     Test Name Result Flag Reference   PHQ-2 Adult Depression Score 0     Q1: 0, Q2: 0   PHQ-2 Adult Depression Screening Negative       (Q) LIPID PANEL WITH DIRECT LDL 29Apr2016 10:40AM [place]     Test Name Result Flag Reference   CHOLESTEROL, TOTAL 129 mg/dL  125-200   HDL CHOLESTEROL 50 mg/dL  > OR = 46   TRIGLICERIDES 33 mg/dL  <548   DIRECT LDL 80 mg/dL  <130   Desirable range <100 mg/dL for patients with CHD or  diabetes and <70 mg/dL for diabetic patients with  known heart disease  CHOL/HDLC RATIO 2 6 (calc)  < OR = 5 0   NON HDL CHOLESTEROL 79 mg/dL (calc)     Target for non-HDL cholesterol is 30 mg/dL higher than   LDL cholesterol target       (1) URINALYSIS w URINE C/S REFLEX (will reflex a microscopy if leukocytes, occult blood, or nitrites are not within normal limits) 29Apr2016 10:40AM [place]     Test Name Result Flag Reference   COLOR YELLOW  YELLOW   APPEARANCE CLEAR  CLEAR   SPECIFIC GRAVITY 1 003  1 001-1 035   PH 7 5  5 0-8 0   GLUCOSE NEGATIVE  NEGATIVE   BILIRUBIN NEGATIVE  NEGATIVE   KETONES NEGATIVE  NEGATIVE   OCCULT BLOOD NEGATIVE  NEGATIVE   PROTEIN NEGATIVE  NEGATIVE   NITRITE NEGATIVE  NEGATIVE   LEUKOCYTE ESTERASE NEGATIVE  NEGATIVE   WBC NONE SEEN /HPF  < OR = 5   RBC 0-2 /HPF  < OR = 2   SQUAMOUS EPITHELIAL CELLS NONE SEEN /HPF  < OR = 5   BACTERIA NONE SEEN /HPF  NONE SEEN   HYALINE CAST NONE SEEN /LPF  NONE SEEN   REFLEXIVE URINE CULTURE NO CULTURE INDICATED       (Q) TSH, 3RD GENERATION 29Apr2016 10:40AM [place]   REPORT COMMENT:  FASTING:YES     Test Name Result Flag Reference   TSH 1 53 mIU/L     Reference Range                         > or = 20 Years  0 40-4 50                              Pregnancy Ranges            First trimester    0 26-2 66            Second trimester   0 55-2 73            Third trimester    0 43-2 91     (1) CBC/PLT/DIFF 19Apr2016 07:18AM Davon Carson, Jas[place]     Test Name Result Flag Reference   WBC COUNT 3 90 Thousand/uL L 4 31-10 16   RBC COUNT 3 37 Million/uL L 3 81-5 12   HEMOGLOBIN 11 1 g/dL L 11 5-15 4   HEMATOCRIT 33 4 % L 34 8-46  1   MCV 99 fL H 82-98   MCH 32 9 pg  26 8-34 3   MCHC 33 2 g/dL  31 4-37 4   RDW 13 4 %  11 6-15 1   MPV 10 6 fL  8 9-12 7   PLATELET COUNT 387 Thousands/uL  149-390   nRBC AUTOMATED 0 /100 WBCs     NEUTROPHILS RELATIVE PERCENT 70 %  43-75   LYMPHOCYTES RELATIVE PERCENT 18 %  14-44   MONOCYTES RELATIVE PERCENT 9 %  4-12   EOSINOPHILS RELATIVE PERCENT 3 %  0-6   BASOPHILS RELATIVE PERCENT 0 %  0-1   NEUTROPHILS ABSOLUTE COUNT 2 76 Thousands/µL  1 85-7 62   LYMPHOCYTES ABSOLUTE COUNT 0 69 Thousands/µL  0 60-4 47   MONOCYTES ABSOLUTE COUNT 0 33 Thousand/µL  0 17-1 22   EOSINOPHILS ABSOLUTE COUNT 0 10 Thousand/µL  0 00-0 61   BASOPHILS ABSOLUTE COUNT 0 01 Thousands/µL  0 00-0 10       Procedure    Procedure: Visual Acuity Test    Indication: routine screening  Inforrmation supplied by a Snellen chart  Results: 20/20 in the right eye with corrective device, 20/20 in the left eye with corrective device Wears contacts   The patient was cooperative  Future Appointments    Date/Time Provider Specialty Site   09/12/2016 03:30 PM ANDRES Garrett   Hematology [facility]   05/08/2017 08:00 AM Ledy Espana DO Family Medicine [facility]     Signatures   Electronically signed by : Gema Gaines DO; May  2 2016  9:03AM EST                       (Author)

## 2018-01-17 NOTE — MISCELLANEOUS
Message   Recorded as Task   Date: 06/08/2017 08:13 AM, Created By: Jacek Molina   Task Name: Call Back   Assigned To: Savanah Lima   Regarding Patient: Eliana Austin, Status: Active   CommentLari Jairo - 08 Jun 2017 8:13 AM     TASK CREATED  Caller: Self; Other; [phone] (Home); [phone] (Work)  PT WOULD LIKE TO KNOW IF THERE IS A DERMATOLOGIST YOU WOULD RECOMMEND FOR HER CONTACT NO IS [address] [facility] - 08 Jun 2017 9:52 AM     TASK REASSIGNED: Previously Assigned To Charlotte Ortiz - 08 Jun 2017 10:13 AM     TASK EDITED  patient informed and provided phone number of his office  Active Problems    1  Anemia (285 9) (D64 9)   2  Blood tests for routine general physical examination (V72 62) (Z00 00)   3  Breast disorder (611 9) (N64 9)   4  Cyst of left ovary (620 2) (N83 202)   5  Dense breasts (793 82) (R92 2)   6  Fibroids (218 9) (D25 9)   7  History of self breast exam   8  Lesion of skin of scalp (709 9) (L98 9)   9  Leukopenia (288 50) (D72 819)   10  Loss of height (781 91) (R29 890)   11  Visit for routine gyn exam (V72 31) (Z01 419)   12  Visit for screening mammogram (V76 12) (Z12 31)    Current Meds   1  Iron Supplement TABS; Therapy: (Recorded:89Ezl6643) to Recorded   2  Vitamin D3 2000 UNIT Oral Capsule; TAKE 1 CAPSULE BY MOUTH DAILY Recorded    Allergies    1  No Known Drug Allergies    2  No Known Environmental Allergies   3   No Known Food Allergies    Signatures   Electronically signed by : Jorge Braswell, ; Jun 8 2017 10:14AM EST                       (Author)

## 2018-05-31 ENCOUNTER — TELEPHONE (OUTPATIENT)
Dept: FAMILY MEDICINE CLINIC | Facility: CLINIC | Age: 53
End: 2018-05-31

## 2018-05-31 NOTE — TELEPHONE ENCOUNTER
I agree with their recommendations and she should go for the lyme titre the beginning of July as ordered

## 2018-06-29 ENCOUNTER — LAB (OUTPATIENT)
Dept: LAB | Facility: CLINIC | Age: 53
End: 2018-06-29
Payer: OTHER GOVERNMENT

## 2018-06-29 ENCOUNTER — TRANSCRIBE ORDERS (OUTPATIENT)
Dept: ADMINISTRATIVE | Facility: HOSPITAL | Age: 53
End: 2018-06-29

## 2018-06-29 DIAGNOSIS — S70.361A INFECTED INSECT BITE OF RIGHT THIGH, INITIAL ENCOUNTER: Primary | ICD-10-CM

## 2018-06-29 DIAGNOSIS — L08.9 INFECTED INSECT BITE OF RIGHT THIGH, INITIAL ENCOUNTER: Primary | ICD-10-CM

## 2018-06-29 DIAGNOSIS — L08.9 INFECTED INSECT BITE OF RIGHT THIGH, INITIAL ENCOUNTER: ICD-10-CM
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
## 2018-06-29 DIAGNOSIS — S70.361A INFECTED INSECT BITE OF RIGHT THIGH, INITIAL ENCOUNTER: ICD-10-CM

## 2018-06-29 LAB
BASOPHILS # BLD AUTO: 0.02 THOUSANDS/ΜL (ref 0–0.1)
BASOPHILS NFR BLD AUTO: 1 % (ref 0–1)
EOSINOPHIL # BLD AUTO: 0.09 THOUSAND/ΜL (ref 0–0.61)
EOSINOPHIL NFR BLD AUTO: 3 % (ref 0–6)
ERYTHROCYTE [DISTWIDTH] IN BLOOD BY AUTOMATED COUNT: 13.3 % (ref 11.6–15.1)
HCT VFR BLD AUTO: 37.9 % (ref 34.8–46.1)
HGB BLD-MCNC: 12.6 G/DL (ref 11.5–15.4)
IMM GRANULOCYTES # BLD AUTO: 0 THOUSAND/UL (ref 0–0.2)
IMM GRANULOCYTES NFR BLD AUTO: 0 % (ref 0–2)
LYMPHOCYTES # BLD AUTO: 0.83 THOUSANDS/ΜL (ref 0.6–4.47)
LYMPHOCYTES NFR BLD AUTO: 26 % (ref 14–44)
MCH RBC QN AUTO: 34.4 PG (ref 26.8–34.3)
MCHC RBC AUTO-ENTMCNC: 33.2 G/DL (ref 31.4–37.4)
MCV RBC AUTO: 104 FL (ref 82–98)
MONOCYTES # BLD AUTO: 0.27 THOUSAND/ΜL (ref 0.17–1.22)
MONOCYTES NFR BLD AUTO: 8 % (ref 4–12)
NEUTROPHILS # BLD AUTO: 2.02 THOUSANDS/ΜL (ref 1.85–7.62)
NEUTS SEG NFR BLD AUTO: 62 % (ref 43–75)
NRBC BLD AUTO-RTO: 0 /100 WBCS
PLATELET # BLD AUTO: 193 THOUSANDS/UL (ref 149–390)
PMV BLD AUTO: 10.9 FL (ref 8.9–12.7)
RBC # BLD AUTO: 3.66 MILLION/UL (ref 3.81–5.12)
WBC # BLD AUTO: 3.23 THOUSAND/UL (ref 4.31–10.16)

## 2018-06-29 PROCEDURE — 85025 COMPLETE CBC W/AUTO DIFF WBC: CPT

## 2018-06-29 PROCEDURE — 86618 LYME DISEASE ANTIBODY: CPT

## 2018-06-29 PROCEDURE — 36415 COLL VENOUS BLD VENIPUNCTURE: CPT

## 2018-07-03 LAB
B BURGDOR IGG SER IA-ACNC: 0.12
B BURGDOR IGM SER IA-ACNC: 0.73

## 2018-07-03 NOTE — PROGRESS NOTES
Message left on patient's personal cell # [phone] Lyme Titer normal - continue with current regimen     PLM

## 2018-07-16 ENCOUNTER — OFFICE VISIT (OUTPATIENT)
Dept: FAMILY MEDICINE CLINIC | Facility: CLINIC | Age: 53
End: 2018-07-16
Payer: OTHER GOVERNMENT

## 2018-07-16 VITALS
DIASTOLIC BLOOD PRESSURE: 58 MMHG | SYSTOLIC BLOOD PRESSURE: 90 MMHG | TEMPERATURE: 99.3 F | HEIGHT: 70 IN | HEART RATE: 68 BPM | BODY MASS INDEX: 22.48 KG/M2 | WEIGHT: 157 LBS

## 2018-07-16 DIAGNOSIS — N39.0 URINARY TRACT INFECTION WITH HEMATURIA, SITE UNSPECIFIED: Primary | ICD-10-CM

## 2018-07-16 DIAGNOSIS — R31.9 URINARY TRACT INFECTION WITH HEMATURIA, SITE UNSPECIFIED: Primary | ICD-10-CM

## 2018-07-16 LAB
SL AMB  POCT GLUCOSE, UA: ABNORMAL
SL AMB LEUKOCYTE ESTERASE,UA: ABNORMAL
SL AMB POCT BILIRUBIN,UA: ABNORMAL
SL AMB POCT BLOOD,UA: ABNORMAL
SL AMB POCT CLARITY,UA: CLEAR
SL AMB POCT COLOR,UA: YELLOW
SL AMB POCT KETONES,UA: ABNORMAL
SL AMB POCT NITRITE,UA: ABNORMAL
SL AMB POCT PH,UA: 5
SL AMB POCT SPECIFIC GRAVITY,UA: 1.02
SL AMB POCT URINE PROTEIN: ABNORMAL
SL AMB POCT UROBILINOGEN: ABNORMAL

## 2018-07-16 PROCEDURE — 81002 URINALYSIS NONAUTO W/O SCOPE: CPT | Performed by: NURSE PRACTITIONER

## 2018-07-16 PROCEDURE — 87186 SC STD MICRODIL/AGAR DIL: CPT | Performed by: NURSE PRACTITIONER

## 2018-07-16 PROCEDURE — 87086 URINE CULTURE/COLONY COUNT: CPT | Performed by: NURSE PRACTITIONER

## 2018-07-16 PROCEDURE — 87077 CULTURE AEROBIC IDENTIFY: CPT | Performed by: NURSE PRACTITIONER

## 2018-07-16 PROCEDURE — 99213 OFFICE O/P EST LOW 20 MIN: CPT | Performed by: NURSE PRACTITIONER

## 2018-07-16 RX ORDER — CIPROFLOXACIN 250 MG/1
250 TABLET, FILM COATED ORAL EVERY 12 HOURS SCHEDULED
Qty: 6 TABLET | Refills: 0 | Status: SHIPPED | OUTPATIENT
Start: 2018-07-16 | End: 2018-07-19

## 2018-07-16 NOTE — PROGRESS NOTES
Assessment/Plan   Diagnoses and all orders for this visit:    Urinary tract infection with hematuria, site unspecified  -     POCT urine dip  -     ciprofloxacin (CIPRO) 250 mg tablet; Take 1 tablet (250 mg total) by mouth every 12 (twelve) hours for 3 days  -     Urinalysis with microscopic  -     Urine culture        Chief Complaint   Patient presents with    Urinary Tract Infection     Abdominal pressure, frequency, started 6 days ago       Subjective   Patient ID: Wiliam Wise is a 47 y o  female  Vitals:    07/16/18 1016   BP: 90/58   Pulse: 68   Temp: 99 3 °F (37 4 °C)     Urinary Tract Infection    This is a new problem  The current episode started in the past 7 days  The problem occurs every urination  The problem has been gradually improving ( began using over-the-counter product to relieve pain for bladder infections)  The quality of the pain is described as aching and burning  The pain is at a severity of 5/10  The pain is moderate  There has been no fever  She is not sexually active  There is no history of pyelonephritis  Pertinent negatives include no chills, discharge, flank pain, frequency, hematuria, hesitancy, nausea, possible pregnancy, sweats, urgency or vomiting  Treatments tried:  azo  The treatment provided moderate relief  has had 2 UTIs in past which  both many years ago       The following portions of the patient's history were reviewed and updated as appropriate: allergies, current medications, past family history, past medical history, past surgical history and problem list     Review of Systems   Constitutional: Negative  Negative for chills, fatigue and fever  HENT: Negative  Negative for congestion, sinus pressure and voice change  Eyes: Negative  Respiratory: Negative  Cardiovascular: Negative  Gastrointestinal: Negative  Negative for nausea and vomiting  Endocrine: Negative  Genitourinary: Positive for dysuria   Negative for flank pain, frequency, hematuria, hesitancy and urgency  Musculoskeletal: Negative  Skin: Negative  Allergic/Immunologic: Negative  Neurological: Negative  Hematological: Negative  Psychiatric/Behavioral: Negative  Objective     Physical Exam   Constitutional: She is oriented to person, place, and time  She appears well-developed and well-nourished  No distress  HENT:   Head: Normocephalic  Right Ear: External ear normal    Left Ear: External ear normal    Nose: Nose normal    Mouth/Throat: Oropharynx is clear and moist  No oropharyngeal exudate  Eyes: Conjunctivae are normal  Right eye exhibits no discharge  Left eye exhibits no discharge  Neck: Normal range of motion  Neck supple  No tracheal deviation present  Cardiovascular: Normal rate, regular rhythm, normal heart sounds and intact distal pulses  No murmur heard  Pulmonary/Chest: Effort normal and breath sounds normal  No respiratory distress  She has no wheezes  Abdominal: Soft  She exhibits no distension and no mass  There is no tenderness  There is no rebound and no guarding  Musculoskeletal: Normal range of motion  She exhibits no edema, tenderness or deformity  Lymphadenopathy:     She has no cervical adenopathy  Neurological: She is alert and oriented to person, place, and time  Skin: Skin is warm and dry  Capillary refill takes less than 2 seconds  No rash noted  She is not diaphoretic  No erythema  Psychiatric: She has a normal mood and affect  Her behavior is normal  Judgment and thought content normal    Nursing note and vitals reviewed    No Known Allergies  Patient Active Problem List   Diagnosis    Menorrhagia    Menorrhagia       Current Outpatient Prescriptions:     Cholecalciferol (VITAMIN D) 2000 UNITS tablet, Take 2,000 Units by mouth daily, Disp: , Rfl:     Ferrous Sulfate (IRON SUPPLEMENT PO), Take by mouth, Disp: , Rfl:     Tranexamic Acid 650 MG TABS, Take by mouth, Disp: , Rfl:     ciprofloxacin (CIPRO) 250 mg tablet, Take 1 tablet (250 mg total) by mouth every 12 (twelve) hours for 3 days, Disp: 6 tablet, Rfl: 0    MedroxyPROGESTERone Acetate (PROVERA PO), Take 1 tablet by mouth daily Takes one daily for 3 weeks then off one week, Disp: , Rfl:     naproxen (EC NAPROSYN) 500 MG EC tablet, Take 1 tablet by mouth 2 (two) times a day with meals for 30 days, Disp: 60 tablet, Rfl: 0  Social History     Social History    Marital status: /Civil Union     Spouse name: N/A    Number of children: N/A    Years of education: N/A     Occupational History           Social History Main Topics    Smoking status: Never Smoker    Smokeless tobacco: Not on file    Alcohol use No      Comment: social drinker per [employer]script     Drug use: No    Sexual activity: Not on file     Other Topics Concern    Not on file     Social History Narrative    Caffeine use     Jewish affiliation [employer]     Uses safety equipment seatbelts      Family History   Problem Relation Age of Onset    Breast cancer Mother     Heart disease Maternal Grandmother     Heart disease Paternal Grandmother     Heart disease Paternal Grandfather

## 2018-07-16 NOTE — PATIENT INSTRUCTIONS
Urinary Tract Infection in Women   AMBULATORY CARE:   A urinary tract infection (UTI)  is caused by bacteria that get inside your urinary tract  Most bacteria that enter your urinary tract come out when you urinate  If the bacteria stay in your urinary tract, you may get an infection  Your urinary tract includes your kidneys, ureters, bladder, and urethra  Urine is made in your kidneys, and it flows from the ureters to the bladder  Urine leaves the bladder through the urethra  A UTI is more common in your lower urinary tract, which includes your bladder and urethra  Common symptoms include the following:   · Urinating more often or waking from sleep to urinate    · Pain or burning when you urinate    · Pain or pressure in your lower abdomen     · Urine that smells bad    · Blood in your urine    · Leaking urine  Seek care immediately if:   · You are urinating very little or not at all  · You have a high fever with shaking chills  · You have side or back pain that gets worse  Contact your healthcare provider if:   · You have a fever  · You do not feel better after 2 days of taking antibiotics  · You are vomiting  · You have questions or concerns about your condition or care  Treatment for a UTI  may include medicines to treat a bacterial infection  You may also need medicines to decrease pain and burning, or decrease the urge to urinate often  Prevent a UTI:   · Empty your bladder often  Urinate and empty your bladder as soon as you feel the need  Do not hold your urine for long periods of time  · Wipe from front to back after you urinate or have a bowel movement  This will help prevent germs from getting into your urinary tract through your urethra  · Drink liquids as directed  Ask how much liquid to drink each day and which liquids are best for you  You may need to drink more liquids than usual to help flush out the bacteria  Do not drink alcohol, caffeine, or citrus juices  These can irritate your bladder and increase your symptoms  Your healthcare provider may recommend cranberry juice to help prevent a UTI  · Urinate after you have sex  This can help flush out bacteria passed during sex  · Do not douche or use feminine deodorants  These can change the chemical balance in your vagina  · Change sanitary pads or tampons often  This will help prevent germs from getting into your urinary tract  · Do pelvic muscle exercises often  Pelvic muscle exercises may help you start and stop urinating  Strong pelvic muscles may help you empty your bladder easier  Squeeze these muscles tightly for 5 seconds like you are trying to hold back urine  Then relax for 5 seconds  Gradually work up to squeezing for 10 seconds  Do 3 sets of 15 repetitions a day, or as directed  Follow up with your healthcare provider as directed:  Write down your questions so you remember to ask them during your visits  © 2017 [address] [employer] Information is for End User's use only and may not be sold, redistributed or otherwise used for commercial purposes  All illustrations and images included in CareNotes® are the copyrighted property of [employer]  or Christopher Lindsey  The above information is an  only  It is not intended as medical advice for individual conditions or treatments  Talk to your doctor, nurse or pharmacist before following any medical regimen to see if it is safe and effective for you  Detail Level: Generalized Include Location In Plan?: No

## 2018-07-18 LAB — BACTERIA UR CULT: ABNORMAL

## 2018-07-23 ENCOUNTER — APPOINTMENT (OUTPATIENT)
Dept: LAB | Facility: CLINIC | Age: 53
End: 2018-07-23
Payer: OTHER GOVERNMENT

## 2018-07-23 LAB
BACTERIA UR QL AUTO: ABNORMAL /HPF
BILIRUB UR QL STRIP: NEGATIVE
CLARITY UR: CLEAR
COLOR UR: YELLOW
GLUCOSE UR STRIP-MCNC: NEGATIVE MG/DL
HGB UR QL STRIP.AUTO: ABNORMAL
HYALINE CASTS #/AREA URNS LPF: ABNORMAL /LPF
KETONES UR STRIP-MCNC: NEGATIVE MG/DL
LEUKOCYTE ESTERASE UR QL STRIP: NEGATIVE
NITRITE UR QL STRIP: NEGATIVE
NON-SQ EPI CELLS URNS QL MICRO: ABNORMAL /HPF
PH UR STRIP.AUTO: 7 [PH] (ref 4.5–8)
PROT UR STRIP-MCNC: NEGATIVE MG/DL
RBC #/AREA URNS AUTO: ABNORMAL /HPF
SP GR UR STRIP.AUTO: 1 (ref 1–1.03)
UROBILINOGEN UR QL STRIP.AUTO: 0.2 E.U./DL
WBC #/AREA URNS AUTO: ABNORMAL /HPF

## 2018-07-23 PROCEDURE — 81001 URINALYSIS AUTO W/SCOPE: CPT | Performed by: NURSE PRACTITIONER

## 2018-07-24 DIAGNOSIS — N30.01 ACUTE CYSTITIS WITH HEMATURIA: Primary | ICD-10-CM

## 2018-07-24 NOTE — PROGRESS NOTES
Follow up UTI urine was + for moderate blood, patient states currently has menses, will retest post end of menses

## 2018-08-06 ENCOUNTER — APPOINTMENT (OUTPATIENT)
Dept: LAB | Facility: CLINIC | Age: 53
End: 2018-08-06
Payer: OTHER GOVERNMENT

## 2018-08-06 DIAGNOSIS — N30.01 ACUTE CYSTITIS WITH HEMATURIA: ICD-10-CM

## 2018-08-06 LAB
BILIRUB UR QL STRIP: NEGATIVE
CLARITY UR: CLEAR
COLOR UR: YELLOW
GLUCOSE UR STRIP-MCNC: NEGATIVE MG/DL
HGB UR QL STRIP.AUTO: NEGATIVE
KETONES UR STRIP-MCNC: NEGATIVE MG/DL
LEUKOCYTE ESTERASE UR QL STRIP: NEGATIVE
NITRITE UR QL STRIP: NEGATIVE
PH UR STRIP.AUTO: 7 [PH] (ref 4.5–8)
PROT UR STRIP-MCNC: NEGATIVE MG/DL
SP GR UR STRIP.AUTO: 1.01 (ref 1–1.03)
UROBILINOGEN UR QL STRIP.AUTO: 0.2 E.U./DL

## 2018-08-06 PROCEDURE — 81003 URINALYSIS AUTO W/O SCOPE: CPT

## 2018-08-13 ENCOUNTER — APPOINTMENT (OUTPATIENT)
Dept: LAB | Facility: CLINIC | Age: 53
End: 2018-08-13
Payer: OTHER GOVERNMENT

## 2018-08-13 DIAGNOSIS — R35.0 URINARY FREQUENCY: Primary | ICD-10-CM

## 2018-08-13 LAB
BACTERIA UR QL AUTO: NORMAL /HPF
BILIRUB UR QL STRIP: NEGATIVE
CLARITY UR: CLEAR
COLOR UR: YELLOW
GLUCOSE UR STRIP-MCNC: NEGATIVE MG/DL
HGB UR QL STRIP.AUTO: ABNORMAL
HYALINE CASTS #/AREA URNS LPF: NORMAL /LPF
KETONES UR STRIP-MCNC: NEGATIVE MG/DL
LEUKOCYTE ESTERASE UR QL STRIP: ABNORMAL
NITRITE UR QL STRIP: NEGATIVE
NON-SQ EPI CELLS URNS QL MICRO: NORMAL /HPF
PH UR STRIP.AUTO: 7 [PH] (ref 4.5–8)
PROT UR STRIP-MCNC: NEGATIVE MG/DL
RBC #/AREA URNS AUTO: NORMAL /HPF
SP GR UR STRIP.AUTO: 1 (ref 1–1.03)
UROBILINOGEN UR QL STRIP.AUTO: 0.2 E.U./DL
WBC #/AREA URNS AUTO: NORMAL /HPF

## 2018-08-13 PROCEDURE — 81001 URINALYSIS AUTO W/SCOPE: CPT

## 2018-08-14 DIAGNOSIS — N30.01 ACUTE CYSTITIS WITH HEMATURIA: Primary | ICD-10-CM

## 2018-08-14 RX ORDER — NITROFURANTOIN 25; 75 MG/1; MG/1
100 CAPSULE ORAL 2 TIMES DAILY
Qty: 10 CAPSULE | Refills: 0 | Status: SHIPPED | OUTPATIENT
Start: 2018-08-14 | End: 2018-08-19

## 2018-08-14 NOTE — PROGRESS NOTES
Patient developed symptoms of dysuria, "pressure" and visualized blood yesterday in AM, called for lab slip, tested and positive for UTI   cipro upset stomach in past, script for macrobid sent

## 2018-08-15 ENCOUNTER — TELEPHONE (OUTPATIENT)
Dept: FAMILY MEDICINE CLINIC | Facility: CLINIC | Age: 53
End: 2018-08-15

## 2018-08-15 NOTE — TELEPHONE ENCOUNTER
AS PER YOUR  CONVERSATION YESTERDAY THE DIRECTIONS ARE NOT THE SAME AS YOU TOLD HER IT SAID TO TAKE MED 2 X A DAY FOR 5 DAYS AND SHE WAS TOLD TO TAKE 3 PILLS  A  DAY FOR 3 DAYS NITROFUNIKOS RODRIGUEZ  PLEASE CALL PT # [phone] PT IS WORSE THIS AM

## 2018-11-05 ENCOUNTER — TELEPHONE (OUTPATIENT)
Dept: FAMILY MEDICINE CLINIC | Facility: CLINIC | Age: 53
End: 2018-11-05

## 2018-11-05 NOTE — TELEPHONE ENCOUNTER
Felicia Echols had a cologuard from you dated 10/17 but never used and she is wondering if you could reissue one for her, she has an appt with you 12/3    [phone]

## 2018-12-03 ENCOUNTER — TELEPHONE (OUTPATIENT)
Dept: FAMILY MEDICINE CLINIC | Facility: CLINIC | Age: 53
End: 2018-12-03

## 2018-12-03 ENCOUNTER — APPOINTMENT (OUTPATIENT)
Dept: RADIOLOGY | Facility: CLINIC | Age: 53
End: 2018-12-03
Payer: OTHER GOVERNMENT

## 2018-12-03 ENCOUNTER — OFFICE VISIT (OUTPATIENT)
Dept: FAMILY MEDICINE CLINIC | Facility: CLINIC | Age: 53
End: 2018-12-03
Payer: OTHER GOVERNMENT

## 2018-12-03 VITALS
HEART RATE: 72 BPM | RESPIRATION RATE: 16 BRPM | WEIGHT: 144.4 LBS | HEIGHT: 70 IN | SYSTOLIC BLOOD PRESSURE: 100 MMHG | TEMPERATURE: 99.4 F | DIASTOLIC BLOOD PRESSURE: 60 MMHG | BODY MASS INDEX: 20.67 KG/M2

## 2018-12-03 DIAGNOSIS — G89.29 CHRONIC RIGHT SHOULDER PAIN: ICD-10-CM

## 2018-12-03 DIAGNOSIS — M77.11 RIGHT LATERAL EPICONDYLITIS: Primary | ICD-10-CM

## 2018-12-03 DIAGNOSIS — M25.511 CHRONIC RIGHT SHOULDER PAIN: ICD-10-CM

## 2018-12-03 DIAGNOSIS — M77.11 RIGHT LATERAL EPICONDYLITIS: ICD-10-CM

## 2018-12-03 PROBLEM — D22.9 MULTIPLE NEVI: Status: ACTIVE | Noted: 2017-10-23

## 2018-12-03 PROCEDURE — 73080 X-RAY EXAM OF ELBOW: CPT

## 2018-12-03 PROCEDURE — 73030 X-RAY EXAM OF SHOULDER: CPT

## 2018-12-03 PROCEDURE — 99213 OFFICE O/P EST LOW 20 MIN: CPT | Performed by: FAMILY MEDICINE

## 2018-12-03 NOTE — TELEPHONE ENCOUNTER
Note      MECHE             IS HAVING SOME PROBLEMS SLEEPING AND WAKES UP IN THE MIDDLE OF THE NIGHT AND SHE WILL TAKE SOME OTC TO HELP HER TO GO BACK TO SLEEP BUT SHE IS CONCERNED BECAUSE IT SAYS  [facility] DRIVE WHILE TAKING THAT MEDICATION SO SHE IS ASKING IF THERE IS CAUSE FOR CONCERN         [phone]

## 2018-12-03 NOTE — TELEPHONE ENCOUNTER
That can be a side effect with the over the counter sleep aides and they can be habit forming  I would suggest caution with using this  She had mentioned a sleep issue at the end of her visit today after spending a considerable amount of time on her other issues and I advised her that this would require a separate office visit  That would be best with myself or Jhonny Srivastava

## 2018-12-03 NOTE — PROGRESS NOTES
Assessment/Plan:  1  Right lateral epicondylitis-we will treat the patient with the tennis elbow brace as ordered  I will check an x-ray of the right elbow to evaluate for any other issues  We will refer her to physical therapy and do trial the Voltaren gel as ordered  2  Chronic right shoulder pain-we will check an x-ray of the right shoulder for further evaluation  She can try the Voltaren gel on her right shoulder as well  We will refer her to physical therapy for further evaluation and treatment  We will monitor her closely and we will see her back as scheduled  Diagnoses and all orders for this visit:    Right lateral epicondylitis  -     Elastic Bandages & Supports (TENNIS ELBOW STRAP) MISC; by Does not apply route daily  -     XR elbow 3+ vw right; Future  -     Ambulatory referral to Physical Therapy; Future  -     diclofenac sodium (VOLTAREN) 1 %; Apply 2 g topically 4 (four) times a day    Chronic right shoulder pain  -     XR shoulder 2+ vw right; Future  -     Ambulatory referral to Physical Therapy; Future  -     diclofenac sodium (VOLTAREN) 1 %; Apply 2 g topically 4 (four) times a day       Return if symptoms worsen or fail to improve  Subjective:   Chief Complaint   Patient presents with    Pain     right elbow/shoulder pain x several months        Patient ID: Cordell Sue is a 46 y o  female presents today for evaluation of chronic right elbow and shoulder pain  Cordell Sue is a 46 y o  female who presents today for evaluation of right shoulder and right elbow pain for several months  The right elbow pain started after using a chain saw in her yard in March 2018  It was chronically irritated and painful with lifting anything  It has been getting less painful over the past month  The pain is on the lateral aspect of her elbow  It is tender to the touch always  The pain happens on and off      She had hurt her right shoulder years ago-she hurt it in a ji-jitsu class lead ago  She was never evaluated for this, but she has over had a problem after exercising and lifting things  She has pain with lifting her arm overhead  Thre is no cracking or popping in the shoulder  It feels like things ar moving around in the joint  There is locking  There is limited range of motion  This has worsened over the last few months with increased activity  She has not had any x-rays or PT  She is not doing anything for the pains except avoiding activities  Arm Pain    The incident occurred more than 1 week ago  The injury mechanism was repetitive motion  The pain is present in the right elbow and right shoulder  The quality of the pain is described as aching, cramping and burning  The pain does not radiate  The pain is moderate  The pain has been fluctuating since the incident  Pertinent negatives include no chest pain, muscle weakness, numbness or tingling  The symptoms are aggravated by lifting and movement  She has tried nothing for the symptoms       The following portions of the patient's history were reviewed and updated as appropriate: allergies, current medications, past family history, past medical history, past social history, past surgical history and problem list   Patient Active Problem List   Diagnosis    Menorrhagia    Anemia    Cyst of left ovary    Dense breasts    Fibroids    Multiple nevi     Past Medical History:   Diagnosis Date    Anemia     Atypical nevus of scalp     last assessed 5/11/17     Basal cell carcinoma of scalp     last assessed 5/11/17     Breast lump     last assessed 5/12/14     Cancer ([facility] [address] )     BCC-left nose    Cervical polyp     Leukopenia     Menorrhagia     last assessed 5/12/14     Metrorrhagia     last assessed 10/22/15     Ovarian cyst     left     Past Surgical History:   Procedure Laterality Date    BREAST BIOPSY      percutan needle core use imag guide stereotactic / benign    BREAST SURGERY Right     bx    COLONOSCOPY  ID EXC SKIN BENIG >4 CM REMAINDR BODY N/A 1/30/2017    Procedure: SCALP SUSPICIOUS LESION EXCISION;  Surgeon: Citlalli Davey MD;  Location: [facility];  Service: Plastics    ID RECMPL WND SCALP,EXTR 2 6-7 5 CM N/A 1/30/2017    Procedure: COMPLEX CLOSURE;  Surgeon: Citlalli Davey MD;  Location: [facility];  Service: Plastics    SKIN BIOPSY      basal cell ca    SKIN CANCER EXCISION      nose     No Known Allergies  Family History   Problem Relation Age of Onset    Breast cancer Mother     Heart disease Maternal Grandmother     Heart disease Paternal Grandmother     Heart disease Paternal Grandfather      Social History     Social History    Marital status: /Civil Union     Spouse name: N/A    Number of children: N/A    Years of education: N/A     Occupational History           Social History Main Topics    Smoking status: Never Smoker    Smokeless tobacco: Never Used    Alcohol use No      Comment: social drinker per allscript     Drug use: No    Sexual activity: Not on file     Other Topics Concern    Not on file     Social History Narrative    Caffeine use     Cheondoism affiliation [employer] disciples of mohit     Uses safety equipment seatbelts      No current outpatient prescriptions on file prior to visit  No current facility-administered medications on file prior to visit  Review of Systems   Constitutional: Negative  HENT: Negative  Eyes: Negative  Respiratory: Negative  Cardiovascular: Negative  Negative for chest pain  Gastrointestinal: Negative  Endocrine: Negative  Genitourinary: Negative  Musculoskeletal: Negative  Skin: Negative  Allergic/Immunologic: Negative  Neurological: Negative  Negative for tingling and numbness  Hematological: Negative  Psychiatric/Behavioral: Negative          Objective:  Vitals:    12/03/18 1344   BP: 100/60   BP Location: Right arm   Patient Position: Sitting   Cuff Size: Standard   Pulse: 72   Resp: 16   Temp: 99 4 °F (37 4 °C)   TempSrc: Tympanic   Weight: 65 5 kg (144 lb 6 4 oz)   Height: 5' 10" (1 778 m)     Body mass index is 20 72 kg/m²  Wt Readings from Last 3 Encounters:   12/03/18 65 5 kg (144 lb 6 4 oz)   07/16/18 71 2 kg (157 lb)   10/23/17 69 4 kg (153 lb)     Temp Readings from Last 3 Encounters:   12/03/18 99 4 °F (37 4 °C) (Tympanic)   07/16/18 99 3 °F (37 4 °C) (Tympanic)   01/30/17 98 9 °F (37 2 °C) (Oral)     BP Readings from Last 3 Encounters:   12/03/18 100/60   07/16/18 90/58   10/23/17 110/70     Pulse Readings from Last 3 Encounters:   12/03/18 72   07/16/18 68   10/23/17 64        Physical Exam   Constitutional: She is oriented to person, place, and time  She appears well-developed and well-nourished  HENT:   Head: Normocephalic and atraumatic  Mouth/Throat: No oropharyngeal exudate  Eyes: Pupils are equal, round, and reactive to light  Conjunctivae and EOM are normal    Neck: Normal range of motion  No JVD present  No tracheal deviation present  No thyromegaly present  Cardiovascular: Normal rate, regular rhythm, normal heart sounds and intact distal pulses  Exam reveals no gallop and no friction rub  No murmur heard  Pulmonary/Chest: Effort normal and breath sounds normal  No stridor  No respiratory distress  She has no wheezes  She has no rales  She exhibits no tenderness  Abdominal: Soft  Bowel sounds are normal  She exhibits no distension and no mass  There is no tenderness  There is no rebound and no guarding  Musculoskeletal: Normal range of motion  She exhibits no edema or deformity  Right elbow: She exhibits normal range of motion, no swelling, no effusion and no deformity  Tenderness found  Lateral epicondyle tenderness noted  Right upper arm: She exhibits tenderness and bony tenderness  Arms:  Lymphadenopathy:     She has no cervical adenopathy  Neurological: She is alert and oriented to person, place, and time   She has normal reflexes  No cranial nerve deficit  She exhibits normal muscle tone  Coordination normal    Skin: Skin is warm and dry

## 2018-12-03 NOTE — TELEPHONE ENCOUNTER
MECHE  IS HAVING SOME PROBLEMS SLEEPING AND WAKES UP IN THE MIDDLE OF THE NIGHT AND SHE WILL TAKE SOME OTC TO HELP HER TO GO BACK TO SLEEP BUT SHE IS CONCERNED BECAUSE IT SAYS  [facility] DRIVE WHILE TAKING THAT MEDICATION SO SHE IS ASKING IF THERE IS CAUSE FOR CONCERN       [phone]

## 2018-12-10 ENCOUNTER — OFFICE VISIT (OUTPATIENT)
Dept: FAMILY MEDICINE CLINIC | Facility: CLINIC | Age: 53
End: 2018-12-10
Payer: OTHER GOVERNMENT

## 2018-12-10 VITALS
SYSTOLIC BLOOD PRESSURE: 94 MMHG | TEMPERATURE: 98.6 F | HEART RATE: 66 BPM | HEIGHT: 70 IN | BODY MASS INDEX: 21.1 KG/M2 | RESPIRATION RATE: 14 BRPM | WEIGHT: 147.4 LBS | DIASTOLIC BLOOD PRESSURE: 60 MMHG | OXYGEN SATURATION: 98 %

## 2018-12-10 DIAGNOSIS — R23.2 VASOMOTOR FLUSHING: Primary | ICD-10-CM

## 2018-12-10 PROCEDURE — 99213 OFFICE O/P EST LOW 20 MIN: CPT | Performed by: NURSE PRACTITIONER

## 2018-12-10 RX ORDER — PAROXETINE HYDROCHLORIDE 12.5 MG/1
12.5 TABLET, FILM COATED, EXTENDED RELEASE ORAL EVERY MORNING
Qty: 30 TABLET | Refills: 0 | Status: SHIPPED | OUTPATIENT
Start: 2018-12-10 | End: 2018-12-28 | Stop reason: ALTCHOICE

## 2018-12-10 NOTE — PROGRESS NOTES
Message left on patient's cell [phone]: x-rays unremarkable - do PT - call office if ? Joselin IVERSON

## 2018-12-10 NOTE — PATIENT INSTRUCTIONS
Menopause   WHAT YOU NEED TO KNOW:   Menopause is a normal stage in a woman's life when her monthly periods stop  Menopause starts when the ovaries slowly stop making the female hormones estrogen and progesterone  A woman who has not had a period for a full year after the age of 39 is considered to be in menopause  Perimenopause is a stage before menopause that may cause signs and symptoms similar to menopause  Perimenopause can last an average of 4 to 5 years  DISCHARGE INSTRUCTIONS:   Follow up with your healthcare provider as directed:  Write down your questions so you remember to ask them during your visits  Self-care:   · Manage hot flashes  Hot flashes are brief periods of feeling very warm, flushed, and sweaty  Hot flashes can last from a few seconds to several minutes  They may happen many times during the day, and are common at night  Layer your clothing so that you can easily remove some clothing and cool yourself during a hot flash  Cold drinks may also be helpful  · Reduce vaginal dryness  by using over-the-counter vaginal creams  Vaginal dryness may cause you to have pain or discomfort during sex  Only use creams that are made for vaginal use  Do  not  use petroleum jelly  You may need an estrogen cream to put in and around your vagina  Estrogen cream may help decrease vaginal dryness and lower your risk of vaginal infections  · Continue to use birth control  during perimenopause if you do not want to get pregnant  You may need to use birth control until it has been 1 year since your periods stopped  Ask your healthcare provider when you can stop using birth control to prevent pregnancy  Lead a healthy lifestyle:  After menopause, your risk for heart disease and bone loss increases  Ask about these and other ways to stay healthy:  · Exercise regularly  Exercise helps you maintain a healthy weight  Exercise can also help to control your blood pressure and cholesterol levels   Include weight-bearing exercise for strong bones  Ask your healthcare provider about the best exercise plan for you  · Eat a variety of healthy foods  Include fruits, vegetables, whole grains (whole-wheat bread, pasta, and cereals), low-fat dairy, and lean protein foods (beans, poultry, and fish)  Limit foods high in sodium (salt)  Ask your healthcare provider for more information about a meal plan that is right for you  · Maintain a healthy weight  Check with your healthcare provider before you start any weight loss program      · Take supplements as directed  You may need extra calcium and vitamin D to help prevent osteoporosis  · Limit alcohol and caffeine  Alcohol and caffeine may worsen your symptoms  · Do not smoke  If you smoke, it is never too late to quit  You are more likely to have a heart attack, lung disease, blood clots, and cancer if you smoke  Ask your healthcare provider for information if you need help quitting  Contact your healthcare provider if:   · You have vaginal bleeding after menopause  · You have questions or concerns about your condition or care  © 2017 [address] [facility] Information is for End User's use only and may not be sold, redistributed or otherwise used for commercial purposes  All illustrations and images included in CareNotes® are the copyrighted property of A [employer] M [employer]  or Christopher Lindsey  The above information is an  only  It is not intended as medical advice for individual conditions or treatments  Talk to your doctor, nurse or pharmacist before following any medical regimen to see if it is safe and effective for you

## 2018-12-10 NOTE — PROGRESS NOTES
Assessment/Plan   Diagnoses and all orders for this visit:    Vasomotor flushing  -     PARoxetine (PAXIL-CR) 12 5 mg 24 hr tablet; Take 1 tablet (12 5 mg total) by mouth every morning    Other orders  -     IRON PO; Take by mouth  -     Cholecalciferol (VITAMIN D PO); Take by mouth        Chief Complaint   Patient presents with   [facility] Flashes     begining September 2017   Insomnia     having difficulty staying asleep  Subjective   Patient ID: Naun Cheng is a 46 y o  female  Vitals:    12/10/18 1258   BP: 94/60   Pulse: 66   Resp: 14   Temp: 98 6 °F (37 °C)   SpO2: 98%     Patient is here today to discuss issues she is having with waking during the night with hot flashes in the unable to get back to sleep  She states her last menstrual period was August 2018, has not gone a full year at this point  She has a gyn appointment scheduled for May of 2019 for her yearly check up  States her symptoms began this past September, no trouble falling asleep but wakes between 2:00 a m  And 4:00 a m  Every morning feeling hot and perspiring, once she cools down she feels able to fall back asleep but admits to at times just lying awake  Has tried several over-the-counter medications for sleep which include Motrin p m , melatonin with some additive in it but not mg town and alone  Reports does not want to feel groggy in the morning  Discussed avoiding caffeine alcohol and spicy foods during the day and had exercise into her daily ritual consider stress reduction techniques as a in mentation or yoga  Defers using estrogen at this time  Discussed the benefits of black cohosh and vitamin E and C  Also discussed the use of low-dose Paxil which can sometimes reduce the symptoms of irritability and vasomotor symptoms due to perimenopause          The following portions of the patient's history were reviewed and updated as appropriate: allergies, current medications, past medical history, past social history, past surgical history and problem list     Review of Systems   Constitutional: Negative  HENT: Negative  Eyes: Negative  Respiratory: Negative  Cardiovascular: Negative  Gastrointestinal: Negative  Endocrine: Negative  Genitourinary: Negative  Musculoskeletal: Negative  Skin: Negative  Allergic/Immunologic: Negative  Neurological: Negative  Hematological: Negative  Psychiatric/Behavioral: Positive for sleep disturbance  Negative for suicidal ideas  Objective     Physical Exam   Constitutional: She is oriented to person, place, and time  She appears well-developed and well-nourished  No distress  HENT:   Head: Normocephalic and atraumatic  Eyes: Conjunctivae are normal  Right eye exhibits no discharge  Left eye exhibits no discharge  Neck: Normal range of motion  Neck supple  No thyromegaly present  Cardiovascular: Normal rate, regular rhythm, normal heart sounds and intact distal pulses  No murmur heard  Pulmonary/Chest: Effort normal and breath sounds normal  No respiratory distress  Abdominal: Soft  Musculoskeletal: Normal range of motion  She exhibits no edema, tenderness or deformity  Lymphadenopathy:     She has no cervical adenopathy  Neurological: She is alert and oriented to person, place, and time  No cranial nerve deficit  Skin: Skin is warm and dry  Capillary refill takes less than 2 seconds  No rash noted  She is not diaphoretic  No erythema  Psychiatric: She has a normal mood and affect  Her behavior is normal  Judgment and thought content normal    Nursing note and vitals reviewed    No Known Allergies  Patient Active Problem List   Diagnosis    Menorrhagia    Anemia    Cyst of left ovary    Dense breasts    Fibroids    Multiple nevi       Current Outpatient Prescriptions:     Cholecalciferol (VITAMIN D PO), Take by mouth, Disp: , Rfl:     diclofenac sodium (VOLTAREN) 1 %, Apply 2 g topically 4 (four) times a day, Disp: 1 Tube, Rfl: 0    Elastic Bandages & Supports (TENNIS ELBOW STRAP) MISC, by Does not apply route daily, Disp: 1 each, Rfl: 0    IRON PO, Take by mouth, Disp: , Rfl:     PARoxetine (PAXIL-CR) 12 5 mg 24 hr tablet, Take 1 tablet (12 5 mg total) by mouth every morning, Disp: 30 tablet, Rfl: 0  Social History     Social History    Marital status: /Civil Union     Spouse name: N/A    Number of children: N/A    Years of education: N/A     Occupational History           Social History Main Topics    Smoking status: Never Smoker    Smokeless tobacco: Never Used    Alcohol use No      Comment: social drinker per allscript     Drug use: No    Sexual activity: Not on file     Other Topics Concern    Not on file     Social History Narrative    Caffeine use     Oriental orthodox affiliation Rastafari Sikh disciples of mohit     Uses safety equipment seatbelts      Family History   Problem Relation Age of Onset    Breast cancer Mother     Heart disease Maternal Grandmother     Heart disease Paternal Grandmother     Heart disease Paternal Grandfather

## 2018-12-17 ENCOUNTER — EVALUATION (OUTPATIENT)
Dept: PHYSICAL THERAPY | Facility: CLINIC | Age: 53
End: 2018-12-17
Payer: OTHER GOVERNMENT

## 2018-12-17 DIAGNOSIS — M77.11 RIGHT LATERAL EPICONDYLITIS: ICD-10-CM

## 2018-12-17 DIAGNOSIS — G89.29 CHRONIC RIGHT SHOULDER PAIN: ICD-10-CM

## 2018-12-17 DIAGNOSIS — M25.511 CHRONIC RIGHT SHOULDER PAIN: ICD-10-CM

## 2018-12-17 PROCEDURE — 97110 THERAPEUTIC EXERCISES: CPT | Performed by: PHYSICAL THERAPIST

## 2018-12-17 PROCEDURE — G8991 OTHER PT/OT GOAL STATUS: HCPCS | Performed by: PHYSICAL THERAPIST

## 2018-12-17 PROCEDURE — 97162 PT EVAL MOD COMPLEX 30 MIN: CPT | Performed by: PHYSICAL THERAPIST

## 2018-12-17 PROCEDURE — G8990 OTHER PT/OT CURRENT STATUS: HCPCS | Performed by: PHYSICAL THERAPIST

## 2018-12-17 NOTE — PROGRESS NOTES
PT Evaluation     Today's date: 2018  Patient name: Danyell Calvillo  : 1965  MRN: [id number]  Referring provider: Santa Jolley DO  Dx:   Encounter Diagnosis     ICD-10-CM    1  Right lateral epicondylitis M77 11 Ambulatory referral to Physical Therapy   2  Chronic right shoulder pain M25 511 Ambulatory referral to Physical Therapy    G89 29                   Assessment  Assessment details: Danyell Calvillo is a 46 y o  female who presents with pain, decreased strength, decreased ROM and decreased joint mobility  Due to these impairments, patient has difficulty performing a/iadls, recreational activities, work-related activities and engaging in social activities  Patient's clinical presentation is consistent with their referring diagnosis of Right lateral epicondylitis, Chronic right shoulder pain  Patient has been educated in home exercise program and plan of care  Patient would benefit from skilled physical therapy services to address their aforementioned functional limitations and progress towards prior level of function and independence with home exercise program    Impairments: abnormal muscle firing, abnormal or restricted ROM, activity intolerance, impaired physical strength, lacks appropriate home exercise program, pain with function and scapular dyskinesis  Understanding of Dx/Px/POC: good   Prognosis: good    Goals  Short Term Goals: Target Date 4 weeks  1  Pt will initiate and advance HEP  2  Pt will have full prom pain free of the right shoulder  3  Pt will have <2/10 pain with activity  4  Pt will be able to reach behind back with out pain      Long Term Goals: Target Date 8 weeks  1  Pt will demonstrate independence in HEP  2  Pt will have full strength of the right UE  3  Pt will have 0/10 pain with activity  4   Pt will be able to carry groceries with out pain      Plan  Patient would benefit from: skilled PT  Planned modality interventions: cryotherapy, electrical stimulation/Russian stimulation and thermotherapy: hydrocollator packs  Planned therapy interventions: joint mobilization, manual therapy, patient education, postural training, activity modification, abdominal trunk stabilization, body mechanics training, flexibility, functional ROM exercises, graded exercise, home exercise program, neuromuscular re-education, strengthening, stretching, therapeutic activities, therapeutic exercise, motor coordination training, muscle pump exercises, gait training, balance/weight bearing training, ADL training and breathing training  Frequency: 2x week  Duration in weeks: 8  Plan of Care beginning date: 2018  Plan of Care expiration date: 2019  Treatment plan discussed with: patient        Subjective Evaluation    History of Present Illness  Mechanism of injury: Pt notes a chronic (20+ year) hx of right shoulder pain, and a history of right elbow pain dating back to March  She notes that the shoulder pain has been increasing, but the elbow pain has been decreasing recently  She notes that she first hurt the elbow in March when clearing wood by and with a chain saw  She notes that she was given voltaren gel for the elbow and shoulder but she tried it for the day and thought it made it worse  She notes that the shoulder hurts when reaching into the back seat of her car, and reaching behind her back  She notes that gripping objects, carrying in groceries bothers her right elbow  She notes that her shoulder feels as if "things are moving in it", and this is not painful but makes her weary  Quality of life: good    Pain  Current pain ratin  At best pain ratin  At worst pain ratin  Location: right elbow and shoulder  Patient Goals  Patient goals for therapy: decreased pain, increased motion, independence with ADLs/IADLs, increased strength and return to sport/leisure activities          Objective     Static Posture     Head  Forward      Shoulders  Asymmetric shoulders, depressed and rounded  Thoracic Spine  Hyperkyphosis  Rib Cage  Rib flaring  Postural Observations  Seated posture: poor  Standing posture: fair  Correction of posture: makes symptoms better        Observations     Right Shoulder  Positive for atrophy  Palpation     Right Tenderness of the infraspinatus, middle trapezius, subscapularis, supraspinatus and wrist flexors  Tenderness     Right Elbow   Tenderness in the lateral epicondyle  Right Wrist/Hand   Tenderness in the lateral epicondyle  Cervical/Thoracic Screen   Cervical range of motion within normal limits    Active Range of Motion     Right Shoulder   Flexion: 145 degrees with pain  Abduction: 140 degrees with pain  External rotation BTH: T2   Internal rotation BTB: L5 with pain    Left Elbow   Normal active range of motion    Right Elbow   Normal active range of motion    Passive Range of Motion     Right Elbow   Normal passive range of motion    Strength/Myotome Testing     Left Elbow   Flexion: 5  Extension: 4+  Forearm supination: 4+  Forearm pronation: 4+    Right Elbow   Flexion: 5  Extension: 4+  Forearm supination: 4  Forearm pronation: 4    Tests     Right Shoulder   Positive Hawkin's, Neer's and passive horizontal adduction             Precautions: anemic

## 2018-12-28 ENCOUNTER — OFFICE VISIT (OUTPATIENT)
Dept: PHYSICAL THERAPY | Facility: CLINIC | Age: 53
End: 2018-12-28
Payer: OTHER GOVERNMENT

## 2018-12-28 ENCOUNTER — OFFICE VISIT (OUTPATIENT)
Dept: FAMILY MEDICINE CLINIC | Facility: CLINIC | Age: 53
End: 2018-12-28
Payer: OTHER GOVERNMENT

## 2018-12-28 VITALS
HEIGHT: 70 IN | SYSTOLIC BLOOD PRESSURE: 100 MMHG | HEART RATE: 60 BPM | WEIGHT: 150.2 LBS | DIASTOLIC BLOOD PRESSURE: 60 MMHG | TEMPERATURE: 98.3 F | BODY MASS INDEX: 21.5 KG/M2

## 2018-12-28 DIAGNOSIS — M77.11 RIGHT LATERAL EPICONDYLITIS: Primary | ICD-10-CM

## 2018-12-28 DIAGNOSIS — M25.511 CHRONIC RIGHT SHOULDER PAIN: ICD-10-CM

## 2018-12-28 DIAGNOSIS — G89.29 CHRONIC RIGHT SHOULDER PAIN: ICD-10-CM

## 2018-12-28 DIAGNOSIS — G47.00 INSOMNIA, UNSPECIFIED TYPE: Primary | ICD-10-CM

## 2018-12-28 PROCEDURE — 97140 MANUAL THERAPY 1/> REGIONS: CPT | Performed by: PHYSICAL THERAPIST

## 2018-12-28 PROCEDURE — 97110 THERAPEUTIC EXERCISES: CPT | Performed by: PHYSICAL THERAPIST

## 2018-12-28 PROCEDURE — 99213 OFFICE O/P EST LOW 20 MIN: CPT | Performed by: NURSE PRACTITIONER

## 2018-12-28 PROCEDURE — 97112 NEUROMUSCULAR REEDUCATION: CPT | Performed by: PHYSICAL THERAPIST

## 2018-12-28 NOTE — PROGRESS NOTES
Assessment/Plan   Diagnoses and all orders for this visit:    Insomnia, unspecified type        Chief Complaint   Patient presents with    Follow-up     follow up for sleep medication        Subjective   Patient ID: Davina Price is a 46 y o  female  Vitals:    18 0936   BP: 100/60   Pulse: 60   Temp: 98 3 °F (36 8 °C)     Ema Colon is here today in follow-up, seen on the  with complaints of poor sleeping due to hot flashes that were occurring during the night  We added Paxil to her medication regime to see if this would help to decrease the vasomotor response to her menopause  Of she states she did not use the Paxil, after purchasing the prescription read the drug inserts and realized that it was an antidepressant, although we had discussed this in the office on the initial evaluation, and she was reluctant to try due to all of the many side effects that are listed on the pamphlet  She has however began using melatonin at night as we discussed previously  Initially she took to 3 mg tablets for total of 6 mg, but then went to 9 mg nightly  States initially she used it before she went to bed, then she changed to if she woke in the night time with the hot flash she would use it then which seems to be more effective for her and she is able to fall back asleep  States that there are times when she does not even need to use it we discussed the benefits of the melatonin and how it may not need to be on a regular basis, but as long as she was getting the sleep that she needed she should continue doing what she is doing  The following portions of the patient's history were reviewed and updated as appropriate: allergies, current medications, past medical history, past social history, past surgical history and problem list     Review of Systems   Constitutional: Negative  Negative for activity change, appetite change and fatigue  HENT: Negative  Eyes: Negative      Respiratory: Negative  Cardiovascular: Negative  Gastrointestinal: Negative  Endocrine: Negative  Genitourinary: Negative  Musculoskeletal: Negative  Skin: Negative  Allergic/Immunologic: Negative  Neurological: Negative  Hematological: Negative  Psychiatric/Behavioral: Negative for decreased concentration and sleep disturbance (improved)  Objective     Physical Exam   Constitutional: She is oriented to person, place, and time  She appears well-developed and well-nourished  No distress  HENT:   Head: Normocephalic and atraumatic  Eyes: Conjunctivae are normal    Neck: Normal range of motion  Neck supple  Cardiovascular: Normal rate, regular rhythm, normal heart sounds and intact distal pulses  Pulmonary/Chest: Effort normal and breath sounds normal    Abdominal: Soft  Musculoskeletal: Normal range of motion  She exhibits no edema or tenderness  Neurological: She is alert and oriented to person, place, and time  Skin: Skin is warm and dry  Capillary refill takes less than 2 seconds  No rash noted  She is not diaphoretic  No erythema  Psychiatric: She has a normal mood and affect  Her behavior is normal  Judgment and thought content normal    Nursing note and vitals reviewed    No Known Allergies  Patient Active Problem List   Diagnosis    Menorrhagia    Anemia    Cyst of left ovary    Dense breasts    Fibroids    Multiple nevi       Current Outpatient Prescriptions:     Cholecalciferol (VITAMIN D PO), Take by mouth, Disp: , Rfl:     diclofenac sodium (VOLTAREN) 1 %, Apply 2 g topically 4 (four) times a day, Disp: 1 Tube, Rfl: 0    Elastic Bandages & Supports (TENNIS ELBOW STRAP) MISC, by Does not apply route daily, Disp: 1 each, Rfl: 0    IRON PO, Take by mouth, Disp: , Rfl:   Social History     Social History    Marital status: /Civil Union     Spouse name: N/A    Number of children: N/A    Years of education: N/A     Occupational History     Social History Main Topics    Smoking status: Never Smoker    Smokeless tobacco: Never Used    Alcohol use No      Comment: social drinker per [employer]     Drug use: No    Sexual activity: Not on file     Other Topics Concern    Not on file     Social History Narrative    Caffeine use     [employer] affiliation [employer]     Uses safety equipment seatbelts      Family History   Problem Relation Age of Onset    Breast cancer Mother     Heart disease Maternal Grandmother     Heart disease Paternal Grandmother     Heart disease Paternal Grandfather

## 2018-12-28 NOTE — PATIENT INSTRUCTIONS
Insomnia   WHAT YOU NEED TO KNOW:   Insomnia is a condition that makes it hard to fall or stay asleep  Lack of sleep can lead to attention or memory problems during the day  You may also be leon, depressed, clumsy, or have headaches  DISCHARGE INSTRUCTIONS:   Contact your healthcare provider if:   · Your symptoms do not get better, or they get worse  · You begin to use drugs or alcohol to fall asleep  · You have questions or concerns about your condition or care  Medicines:   · Medicines  may help you sleep more regularly or help you feel less anxious  · Take your medicine as directed  Contact your healthcare provider if you think your medicine is not helping or if you have side effects  Tell him or her if you are allergic to any medicine  Keep a list of the medicines, vitamins, and herbs you take  Include the amounts, and when and why you take them  Bring the list or the pill bottles to follow-up visits  Carry your medicine list with you in case of an emergency  What you can do to improve your sleep:   · Create a sleep schedule  This will help you form a sleep routine  Keep a record of your sleep patterns, and any sleeping problems you have  Bring the record to follow-up visits with healthcare providers  · Do not take naps  Naps could make it hard for you to fall asleep at bedtime  · Keep your bedroom cool, quiet, and dark  Turn on white noise, such as a fan, to help you relax  Do not use your bed for any activity that will keep you awake  Do not read, exercise, eat, or watch TV in your bedroom  · Get up if you do not fall asleep within 20 minutes  Move to another room and do something relaxing until you become sleepy  · Limit caffeine, alcohol, and food to earlier in the day  Only drink caffeine in the morning  Do not drink alcohol within 6 hours of bedtime  Do not eat a heavy meal right before you go to bed  · Exercise regularly  Daily exercise may help you sleep better   Do not exercise within 4 hours of bedtime  Follow up with your healthcare provider as directed: Your healthcare provider may refer you for cognitive behavioral therapy  A behavioral therapist may help you find ways to relax, decrease stress, and improve sleep  Write down your questions so you remember to ask them during your visits  © 2017 [employer] Information is for End User's use only and may not be sold, redistributed or otherwise used for commercial purposes  All illustrations and images included in CareNotes® are the copyrighted property of [employer]  or [employer]  The above information is an  only  It is not intended as medical advice for individual conditions or treatments  Talk to your doctor, nurse or pharmacist before following any medical regimen to see if it is safe and effective for you

## 2018-12-28 NOTE — PROGRESS NOTES
Daily Note     Today's date: 2018  Patient name: Lety Sharp  : 1965  MRN: [id number]  Referring provider: Danny Rose DO  Dx:   Encounter Diagnosis     ICD-10-CM    1  Right lateral epicondylitis M77 11    2  Chronic right shoulder pain M25 511     G89 29                   Subjective: pt notes that she feel okay since the start of her exercises  Objective: See treatment diary below  Precautions: anemic    Daily Treatment Diary       Manuals             Right wrist ext stm DB            Right infraspinatus stm DB            Right shld prom DB                         Exercise Diary              UBE nv            scap stabs supine 20''x3            Serratus ball roll updown 2x10 hold med lat            S/l flex to 90 2x10            S/l H abd 2x10            S/l Er 2x10            Wrist ext ecc emphasis 3# x10            Bicep curls (pronated and neutral) Green 2x10                                                                                                                                                                                                  Modalities                                             Assessment: pt with poor scap control and scap stability    Plan: Continue per plan of care

## 2019-01-02 ENCOUNTER — OFFICE VISIT (OUTPATIENT)
Dept: PHYSICAL THERAPY | Facility: CLINIC | Age: 54
End: 2019-01-02
Payer: OTHER GOVERNMENT

## 2019-01-02 DIAGNOSIS — G89.29 CHRONIC RIGHT SHOULDER PAIN: ICD-10-CM

## 2019-01-02 DIAGNOSIS — M77.11 RIGHT LATERAL EPICONDYLITIS: Primary | ICD-10-CM

## 2019-01-02 DIAGNOSIS — M25.511 CHRONIC RIGHT SHOULDER PAIN: ICD-10-CM

## 2019-01-02 PROCEDURE — 97140 MANUAL THERAPY 1/> REGIONS: CPT

## 2019-01-02 PROCEDURE — 97112 NEUROMUSCULAR REEDUCATION: CPT

## 2019-01-02 NOTE — PROGRESS NOTES
Daily Note     Today's date: 2019  Patient name: Rosemary Grijalva  : 1965  MRN: [id number]  Referring provider: Seth Snowden DO  Dx:   Encounter Diagnosis     ICD-10-CM    1  Right lateral epicondylitis M77 11    2  Chronic right shoulder pain M25 511     G89 29                   Subjective: pt notes that her shoulder feels like it does not want to move correctly      Objective: See treatment diary below  Precautions: anemic    Daily Treatment Diary       Manuals            Right wrist ext stm DB DL           Right infraspinatus stm DB DL           Right shld prom DB DL                        Exercise Diary              UBE nv 2'x2'           scap stabs supine 20''x3 20"x3           Serratus ball roll updown 2x10 hold med lat Up/down  2x10           S/l flex to 90 2x10 2x10           S/l H abd 2x10 2x10           S/l Er 2x10 2x10           Wrist ext ecc emphasis 3# x10 nv           Bicep curls (pronated and neutral) Green 2x10 gtb  2x10 ea           Prone row  2x10           Prone ext  2x10                                                                                                                                                                       Modalities                                           Assessment: Tolerated treatment with some soreness in muscles in scap region   Patient requiring Vc and TC for proper scap control and placement      Plan: Continue per plan of care   plan to add weight to sidelying shoulder exercises nv

## 2019-01-07 ENCOUNTER — OFFICE VISIT (OUTPATIENT)
Dept: PHYSICAL THERAPY | Facility: CLINIC | Age: 54
End: 2019-01-07
Payer: OTHER GOVERNMENT

## 2019-01-07 DIAGNOSIS — G89.29 CHRONIC RIGHT SHOULDER PAIN: ICD-10-CM

## 2019-01-07 DIAGNOSIS — M77.11 RIGHT LATERAL EPICONDYLITIS: Primary | ICD-10-CM

## 2019-01-07 DIAGNOSIS — M25.511 CHRONIC RIGHT SHOULDER PAIN: ICD-10-CM

## 2019-01-07 PROCEDURE — 97140 MANUAL THERAPY 1/> REGIONS: CPT

## 2019-01-07 PROCEDURE — 97112 NEUROMUSCULAR REEDUCATION: CPT

## 2019-01-07 NOTE — PROGRESS NOTES
Daily Note     Today's date: 2019  Patient name: Lety Sharp  : 1965  MRN: [id number]  Referring provider: Danny Rose DO  Dx:   Encounter Diagnosis     ICD-10-CM    1  Right lateral epicondylitis M77 11    2  Chronic right shoulder pain M25 511     G89 29                   Subjective: she notes that she was a little sore for about 1 day after last visit anterior shoulder  Objective: See treatment diary below  Precautions: anemic    Daily Treatment Diary       Manuals           Right wrist ext stm DB DL DL          Right infraspinatus stm DB DL DL+  pec          Right shld prom DB DL DL          T/s mobs R upa   DB          Exercise Diary              UBE nv 2'x2' 2'x2'          scap stabs supine 20''x3 20"x3 30"x3          Serratus ball roll updown 2x10 hold med lat Up/down  2x10 Up/down  2x10          S/l flex to 90 2x10 2x10 1#  2x10          S/l H abd 2x10 2x10 1#  2x10          S/l Er 2x10 2x10 1#  2x10          Wrist ext ecc emphasis 3# x10 nv 3#  x10,x5          Bicep curls (pronated and neutral) Green 2x10 gtb  2x10 ea gtb  2x10          Prone row  2x10 2x10          Prone ext  2x10 2x10                                                                                                                                                                      Modalities                                             Assessment: Tolerated treatment with added weight on sidelying exercises with no increased pain during program           Plan: Continue per plan of care

## 2019-01-14 ENCOUNTER — OFFICE VISIT (OUTPATIENT)
Dept: PHYSICAL THERAPY | Facility: CLINIC | Age: 54
End: 2019-01-14
Payer: OTHER GOVERNMENT

## 2019-01-14 DIAGNOSIS — G89.29 CHRONIC RIGHT SHOULDER PAIN: ICD-10-CM

## 2019-01-14 DIAGNOSIS — M77.11 RIGHT LATERAL EPICONDYLITIS: Primary | ICD-10-CM

## 2019-01-14 DIAGNOSIS — M25.511 CHRONIC RIGHT SHOULDER PAIN: ICD-10-CM

## 2019-01-14 PROCEDURE — 97112 NEUROMUSCULAR REEDUCATION: CPT | Performed by: PHYSICAL THERAPIST

## 2019-01-14 PROCEDURE — 97140 MANUAL THERAPY 1/> REGIONS: CPT | Performed by: PHYSICAL THERAPIST

## 2019-01-14 NOTE — PROGRESS NOTES
Daily Note     Today's date: 2019  Patient name: Carey Abarca  : 1965  MRN: [id number]  Referring provider: Ledy Espana DO  Dx:   Encounter Diagnosis     ICD-10-CM    1  Right lateral epicondylitis M77 11    2  Chronic right shoulder pain M25 511     G89 29                   Subjective: pt notes that her shoulder is sore today secondary to using a chainsaw this weekend and having difficulty getting it started  Objective: See treatment diary below  Precautions: anemic    Daily Treatment Diary       Manuals          Right wrist ext stm DB DL DL DB         Right infraspinatus stm DB DL DL+  pec DB         Right shld prom DB DL DL DB         T/s mobs R upa   DB nv         Exercise Diary              UBE nv 2'x2' 2'x2' 2'x2'         scap stabs supine 20''x3 20"x3 30"x3 30''x3         Serratus ball roll updown 2x10 hold med lat Up/down  2x10 Up/down  2x10 Up down 2x10         S/l flex to 90 2x10 2x10 1#  2x10 1# 2x10         S/l H abd 2x10 2x10 1#  2x10 1# 2x10         S/l Er 2x10 2x10 1#  2x10 1# 2x10         Wrist ext ecc emphasis 3# x10 nv 3#  x10,x5 4# 2x10         Bicep curls (pronated and neutral) Green 2x10 gtb  2x10 ea gtb  2x10 GTB 2x10 ea         Prone row  2x10 2x10 4# 2x10         Prone ext  2x10 2x10 2# 2x10         S/l abd    1# 2x10         Prone H abd    1# x10 0# x10                                                                                                                                           Modalities                                             Assessment: pt able to tolerate all exercises and increases with only slight discomfort in ant shoulder  Plan: Continue per plan of care

## 2019-01-21 ENCOUNTER — OFFICE VISIT (OUTPATIENT)
Dept: PHYSICAL THERAPY | Facility: CLINIC | Age: 54
End: 2019-01-21
Payer: OTHER GOVERNMENT

## 2019-01-21 DIAGNOSIS — M77.11 RIGHT LATERAL EPICONDYLITIS: Primary | ICD-10-CM

## 2019-01-21 DIAGNOSIS — G89.29 CHRONIC RIGHT SHOULDER PAIN: ICD-10-CM

## 2019-01-21 DIAGNOSIS — M25.511 CHRONIC RIGHT SHOULDER PAIN: ICD-10-CM

## 2019-01-21 PROCEDURE — 97112 NEUROMUSCULAR REEDUCATION: CPT

## 2019-01-21 PROCEDURE — 97140 MANUAL THERAPY 1/> REGIONS: CPT

## 2019-01-21 NOTE — PROGRESS NOTES
Daily Note     Today's date: 2019  Patient name: Munira Castro  : 1965  MRN: [id number]  Referring provider: Cathleen Pizano DO  Dx:   Encounter Diagnosis     ICD-10-CM    1  Right lateral epicondylitis M77 11    2  Chronic right shoulder pain M25 511     G89 29                   Subjective: pt reports that she was able to carry a few heavier bags of groceries and had no pain in elbow or shoulder  Objective: See treatment diary below  Precautions: anemic    Daily Treatment Diary       Manuals         Right wrist ext stm DB DL DL DB DL        Right infraspinatus stm DB DL DL+  pec DB DL        Right shld prom DB DL DL DB DL        T/s mobs R upa   DB nv         Exercise Diary              UBE nv 2'x2' 2'x2' 2'x2' 2'x2'        scap stabs supine 20''x3 20"x3 30"x3 30''x3 30"x3        Serratus ball roll updown 2x10 hold med lat Up/down  2x10 Up/down  2x10 Up down 2x10 np        S/l flex to 90 2x10 2x10 1#  2x10 1# 2x10 2#  2x10        S/l H abd 2x10 2x10 1#  2x10 1# 2x10 2#  2x10        S/l Er 2x10 2x10 1#  2x10 1# 2x10 2#  2x10        Wrist ext ecc emphasis 3# x10 nv 3#  x10,x5 4# 2x10 4#  2x10        Bicep curls (pronated and neutral) Green 2x10 gtb  2x10 ea gtb  2x10 GTB 2x10 ea Blue  2x10        Prone row  2x10 2x10 4# 2x10 4#  2x10        Prone ext  2x10 2x10 2# 2x10 2#  2x10        S/l abd    1# 2x10 2#  2x10        Prone H abd    1# x10 0# x10 2x10                                                                                                                                          Modalities                                             Assessment: Tolerated treatment with fatigue only with increased weight on sidelying exercises   Updated HEP and DC isometrics for shoulder for home  Plan: pt will be discharged next week, as her work schedule has changed and she is feeling better overall

## 2019-01-28 ENCOUNTER — OFFICE VISIT (OUTPATIENT)
Dept: PHYSICAL THERAPY | Facility: CLINIC | Age: 54
End: 2019-01-28
Payer: OTHER GOVERNMENT

## 2019-01-28 DIAGNOSIS — M25.511 CHRONIC RIGHT SHOULDER PAIN: ICD-10-CM

## 2019-01-28 DIAGNOSIS — G89.29 CHRONIC RIGHT SHOULDER PAIN: ICD-10-CM

## 2019-01-28 DIAGNOSIS — M77.11 RIGHT LATERAL EPICONDYLITIS: Primary | ICD-10-CM

## 2019-01-28 PROCEDURE — 97112 NEUROMUSCULAR REEDUCATION: CPT

## 2019-01-28 PROCEDURE — 97140 MANUAL THERAPY 1/> REGIONS: CPT

## 2019-01-28 NOTE — PROGRESS NOTES
Daily Note     Today's date: 2019  Patient name: Willie Huerta  : 1965  MRN: [id number]  Referring provider: Monica Toure DO  Dx:   Encounter Diagnosis     ICD-10-CM    1  Right lateral epicondylitis M77 11    2  Chronic right shoulder pain M25 511     G89 29                   Subjective: states that she had some elbow soreness with lifting groceries this week but also notes she added weight to HEP       Objective: See treatment diary below  Precautions: anemic    Daily Treatment Diary       Manuals        Right wrist ext stm DB DL DL DB DL DL       Right infraspinatus stm DB DL DL+  pec DB DL DL       Right shld prom DB DL DL DB DL DL       T/s mobs R upa   DB nv         Exercise Diary              UBE nv 2'x2' 2'x2' 2'x2' 2'x2' lv 4 2  2'x2'       scap stabs supine 20''x3 20"x3 30"x3 30''x3 30"x3 np       Serratus ball roll updown 2x10 hold med lat Up/down  2x10 Up/down  2x10 Up down 2x10 np        S/l flex to 90 2x10 2x10 1#  2x10 1# 2x10 2#  2x10 2#  2x10       S/l H abd 2x10 2x10 1#  2x10 1# 2x10 2#  2x10 2#  2x10       S/l Er 2x10 2x10 1#  2x10 1# 2x10 2#  2x10 2#  2x10       Wrist ext ecc emphasis 3# x10 nv 3#  x10,x5 4# 2x10 4#  2x10        Bicep curls (pronated and neutral) Green 2x10 gtb  2x10 ea gtb  2x10 GTB 2x10 ea Blue  2x10 Green  2x10       Prone row  2x10 2x10 4# 2x10 4#  2x10 4#  2x10       Prone ext  2x10 2x10 2# 2x10 2#  2x10 2#  2x10       S/l abd    1# 2x10 2#  2x10 2#  2x10       Prone H abd    1# x10 0# x10 2x10 2#  2x10                                                                                                                                         Modalities                                               Assessment: Tolerated treatment with no increased pain   Patient demonstrated fatigue post treatment      Plan: Potential discharge next visit  due to work schedule change    Will update HEP at next visit

## 2019-01-31 ENCOUNTER — OFFICE VISIT (OUTPATIENT)
Dept: PHYSICAL THERAPY | Facility: CLINIC | Age: 54
End: 2019-01-31
Payer: OTHER GOVERNMENT

## 2019-01-31 DIAGNOSIS — M25.511 CHRONIC RIGHT SHOULDER PAIN: ICD-10-CM

## 2019-01-31 DIAGNOSIS — G89.29 CHRONIC RIGHT SHOULDER PAIN: ICD-10-CM

## 2019-01-31 DIAGNOSIS — M77.11 RIGHT LATERAL EPICONDYLITIS: Primary | ICD-10-CM

## 2019-01-31 PROCEDURE — 97110 THERAPEUTIC EXERCISES: CPT | Performed by: PHYSICAL THERAPIST

## 2019-01-31 PROCEDURE — 97140 MANUAL THERAPY 1/> REGIONS: CPT | Performed by: PHYSICAL THERAPIST

## 2019-01-31 PROCEDURE — 97112 NEUROMUSCULAR REEDUCATION: CPT | Performed by: PHYSICAL THERAPIST

## 2019-01-31 NOTE — PROGRESS NOTES
Daily Note     Today's date: 2019  Patient name: Robles Perez  : 1965  MRN: [id number]  Referring provider: Tu Branham DO  Dx:   Encounter Diagnosis     ICD-10-CM    1  Right lateral epicondylitis M77 11    2  Chronic right shoulder pain M25 511     G89 29                   Subjective: Pt notes that over all her elbow has been better, and only get some soreness in her shoulder  Objective: See treatment diary below  Precautions: anemic    Daily Treatment Diary       Manuals       Right wrist ext stm DB DL DL DB DL DL DB      Right infraspinatus stm DB DL DL+  pec DB DL DL DB      Right shld prom DB DL DL DB DL DL DB      T/s mobs R upa   DB nv         Exercise Diary              UBE nv 2'x2' 2'x2' 2'x2' 2'x2' lv 4 2  2'x2' lv 4 2 2'x2'      scap stabs supine 20''x3 20"x3 30"x3 30''x3 30"x3 np       Serratus ball roll updown 2x10 hold med lat Up/down  2x10 Up/down  2x10 Up down 2x10 np        S/l flex to 90 2x10 2x10 1#  2x10 1# 2x10 2#  2x10 2#  2x10 3# 2x10      S/l H abd 2x10 2x10 1#  2x10 1# 2x10 2#  2x10 2#  2x10 3# 2x10      S/l Er 2x10 2x10 1#  2x10 1# 2x10 2#  2x10 2#  2x10 3# 2x10      Wrist ext ecc emphasis 3# x10 nv 3#  x10,x5 4# 2x10 4#  2x10        Bicep curls (pronated and neutral) Green 2x10 gtb  2x10 ea gtb  2x10 GTB 2x10 ea Blue  2x10 Green  2x10 Green 2x10      Prone row  2x10 2x10 4# 2x10 4#  2x10 4#  2x10 4# 2x10      Prone ext  2x10 2x10 2# 2x10 2#  2x10 2#  2x10 3# 2x10      S/l abd    1# 2x10 2#  2x10 2#  2x10 3# 2x10      Prone H abd    1# x10 0# x10 2x10 2#  2x10 3# 2x10                                                                                                                                        Modalities                                           Assessment: pt is independent with HEP, symptoms are under control at this time  HEP has been updated for progressions   Pt will be d/c'd at this time secondary to change in work schedule  Pt will return if symptoms worsen and when work schedule eases        Plan: d/c at this time

## 2019-02-15 ENCOUNTER — TELEPHONE (OUTPATIENT)
Dept: OTHER | Facility: OTHER | Age: 54
End: 2019-02-15

## 2019-02-15 NOTE — TELEPHONE ENCOUNTER
Carmelita Menezes 1965  CONFIDENTIALTY NOTICE: This fax transmission is intended only for the addressee  It contains information that is legally privileged,  confidential or otherwise protected from use or disclosure  If you are not the intended recipient, you are strictly prohibited from reviewing,  disclosing, copying using or disseminating any of this information or taking any action in reliance on or regarding this information  If you have  received this fax in error, please notify us immediately by telephone so that we can arrange for its return to us  Page:   Call Id: [id number]  Health Call  Standard Call Report  Health Call  Patient Name: Carmelita Menezes  Gender: Female  : 1965  Age: 46 Y 10 M 8 D  Return Phone  Number: [phone] (Home)  Address: [address] [place] [address]  City/State/Zip: [address]  Practice Name: [facility]  Practice Charged:  Physician:  [facility] Name:  Relationship To  Patient: Self  Return Phone Number: [phone] (Home)  Presenting Problem: "I would like to know if I am able  to take medication diphenylamine  sleeping aid what mg "  Service Type: Triage  Charged Service 1: Angela Woodruff U  38  Name and  Number:  Nurse Assessment  Protocols  Protocol Title Nurse Date/Time  Insomnia Marilyn Covert 2/15/2019 4:35:18 PM  Question Caller Affirmed  Disp  Time Disposition Final User  2/15/2019 4:38:19 PM See PCP When Office is Open (within 3  days)  Yes BINTA Ruiz, [facility] Advice Given Per Protocol  SEE PCP WITHIN 3 DAYS: * You need to be seen within 2 or 3 days  Call your doctor during regular office hours and make an  appointment  An urgent care center is often the best source of care if your doctor's office is closed or you can't get an appointment  NOTE: If office will be open tomorrow, tell caller to call then, not in 3 days   * IF PATIENT HAS NO PCP: An urgent care center is often  the best source of care if you do not have a regular doctor you can see in the next couple days  NOTE: Try to help caller find a doctor  Is  there a physician referral line or other resource? Having a PCP or 'medical home' means better long-term care  TIPS FOR GOOD SLEEP:  * Use your bed only for sleeping and sex  Do not use your bed for eating, reading or watching TV  * Regular exercise is good for your  body and helps you sleep  Do not exercise right before bedtime  * Drink a small glass of warm milk at bedtime  * Take a warm bath or  shower before bedtime  * Try to go to bed at the same time each night  More importantly, get up at the same time each morning (don't  sleep in ) [facility] BEDROOM: * Keep bedroom temperature cool, not warm or cold  * Keep bedroom quiet  and dark  * Use a comfortable mattress  CALL BACK IF: * You become worse  CARE ADVICE given per Insomnia (Adult) guideline  Caller Understands: Yes  Julien Angel 1965  CONFIDENTIALTY NOTICE: This fax transmission is intended only for the addressee  It contains information that is legally privileged,  confidential or otherwise protected from use or disclosure  If you are not the intended recipient, you are strictly prohibited from reviewing,  disclosing, copying using or disseminating any of this information or taking any action in reliance on or regarding this information  If you have  received this fax in error, please notify us immediately by telephone so that we can arrange for its return to us  Page: 2 of 2  Call Id: [id number]  Caller Disagree/Comply: Comply  PreDisposition: Unsure  Comments  User: Sara Neal Date/Time: 2/15/2019 4:54:00 PM  Patient inquiring if she can take Unisom and Motrin PM  I felt unsteady about that plan  I stated I did not feel taking both  together is a good idea  I paged Dr Wanda Arroyo to the office to discuss a plan for the patient while she awaits making an appointment    He agreed that the patient should only take Unisom and then get an appointment in the office  Called the patient back to confirm  care advice

## 2019-03-28 DIAGNOSIS — Z13.6 SCREENING FOR CARDIOVASCULAR CONDITION: ICD-10-CM

## 2019-03-28 DIAGNOSIS — Z13.1 SCREENING FOR DIABETES MELLITUS: ICD-10-CM

## 2019-03-28 DIAGNOSIS — D64.9 ANEMIA, UNSPECIFIED TYPE: ICD-10-CM

## 2019-03-28 DIAGNOSIS — Z11.4 SCREENING FOR HIV (HUMAN IMMUNODEFICIENCY VIRUS): ICD-10-CM

## 2019-03-28 DIAGNOSIS — Z00.00 HEALTHCARE MAINTENANCE: Primary | ICD-10-CM

## 2019-04-30 ENCOUNTER — DOCUMENTATION (OUTPATIENT)
Dept: FAMILY MEDICINE CLINIC | Facility: CLINIC | Age: 54
End: 2019-04-30

## 2019-05-06 ENCOUNTER — ANNUAL EXAM (OUTPATIENT)
Dept: OBGYN CLINIC | Facility: CLINIC | Age: 54
End: 2019-05-06
Payer: OTHER GOVERNMENT

## 2019-05-06 VITALS
BODY MASS INDEX: 21.03 KG/M2 | SYSTOLIC BLOOD PRESSURE: 96 MMHG | DIASTOLIC BLOOD PRESSURE: 62 MMHG | WEIGHT: 150.2 LBS | HEIGHT: 71 IN

## 2019-05-06 DIAGNOSIS — Z12.4 CERVICAL CANCER SCREENING: Primary | ICD-10-CM

## 2019-05-06 DIAGNOSIS — N63.10 BREAST MASS, RIGHT: ICD-10-CM

## 2019-05-06 DIAGNOSIS — Z11.51 SCREENING FOR HPV (HUMAN PAPILLOMAVIRUS): ICD-10-CM

## 2019-05-06 DIAGNOSIS — Z12.31 ENCOUNTER FOR SCREENING MAMMOGRAM FOR MALIGNANT NEOPLASM OF BREAST: ICD-10-CM

## 2019-05-06 DIAGNOSIS — D21.9 FIBROIDS: ICD-10-CM

## 2019-05-06 DIAGNOSIS — Z01.419 ENCOUNTER FOR ANNUAL ROUTINE GYNECOLOGICAL EXAMINATION: ICD-10-CM

## 2019-05-06 PROCEDURE — 87624 HPV HI-RISK TYP POOLED RSLT: CPT | Performed by: OBSTETRICS & GYNECOLOGY

## 2019-05-06 PROCEDURE — G0124 SCREEN C/V THIN LAYER BY MD: HCPCS | Performed by: PATHOLOGY

## 2019-05-06 PROCEDURE — 99396 PREV VISIT EST AGE 40-64: CPT | Performed by: OBSTETRICS & GYNECOLOGY

## 2019-05-06 PROCEDURE — G0145 SCR C/V CYTO,THINLAYER,RESCR: HCPCS | Performed by: PATHOLOGY

## 2019-05-07 LAB
HPV HR 12 DNA CVX QL NAA+PROBE: NEGATIVE
HPV16 DNA CVX QL NAA+PROBE: POSITIVE
HPV18 DNA CVX QL NAA+PROBE: NEGATIVE

## 2019-05-09 LAB
LAB AP GYN PRIMARY INTERPRETATION: ABNORMAL
LAB AP LMP: ABNORMAL
PATH INTERP SPEC-IMP: ABNORMAL

## 2019-05-10 ENCOUNTER — OFFICE VISIT (OUTPATIENT)
Dept: FAMILY MEDICINE CLINIC | Facility: CLINIC | Age: 54
End: 2019-05-10
Payer: OTHER GOVERNMENT

## 2019-05-10 ENCOUNTER — APPOINTMENT (OUTPATIENT)
Dept: LAB | Facility: CLINIC | Age: 54
End: 2019-05-10
Payer: OTHER GOVERNMENT

## 2019-05-10 VITALS
OXYGEN SATURATION: 96 % | BODY MASS INDEX: 20.92 KG/M2 | HEART RATE: 64 BPM | DIASTOLIC BLOOD PRESSURE: 60 MMHG | TEMPERATURE: 97.6 F | WEIGHT: 149.4 LBS | SYSTOLIC BLOOD PRESSURE: 98 MMHG | HEIGHT: 71 IN

## 2019-05-10 DIAGNOSIS — F51.01 PRIMARY INSOMNIA: ICD-10-CM

## 2019-05-10 DIAGNOSIS — D50.9 IRON DEFICIENCY ANEMIA, UNSPECIFIED IRON DEFICIENCY ANEMIA TYPE: ICD-10-CM

## 2019-05-10 DIAGNOSIS — Z23 NEED FOR VACCINATION: ICD-10-CM

## 2019-05-10 DIAGNOSIS — S86.912A KNEE STRAIN, LEFT, INITIAL ENCOUNTER: ICD-10-CM

## 2019-05-10 DIAGNOSIS — G47.9 SLEEP DISTURBANCE: Primary | ICD-10-CM

## 2019-05-10 DIAGNOSIS — R53.82 CHRONIC FATIGUE: ICD-10-CM

## 2019-05-10 DIAGNOSIS — G47.9 SLEEP DISTURBANCE: ICD-10-CM

## 2019-05-10 PROBLEM — H25.093 AGE-RELATED INCIPIENT CATARACT OF BOTH EYES: Status: ACTIVE | Noted: 2019-05-10

## 2019-05-10 LAB
ALBUMIN SERPL BCP-MCNC: 4.2 G/DL (ref 3.5–5)
ALP SERPL-CCNC: 80 U/L (ref 46–116)
ALT SERPL W P-5'-P-CCNC: 27 U/L (ref 12–78)
ANION GAP SERPL CALCULATED.3IONS-SCNC: 4 MMOL/L (ref 4–13)
AST SERPL W P-5'-P-CCNC: 21 U/L (ref 5–45)
BASOPHILS # BLD AUTO: 0.03 THOUSANDS/ΜL (ref 0–0.1)
BASOPHILS NFR BLD AUTO: 1 % (ref 0–1)
BILIRUB SERPL-MCNC: 0.52 MG/DL (ref 0.2–1)
BUN SERPL-MCNC: 14 MG/DL (ref 5–25)
CALCIUM SERPL-MCNC: 8.8 MG/DL (ref 8.3–10.1)
CHLORIDE SERPL-SCNC: 107 MMOL/L (ref 100–108)
CHOLEST SERPL-MCNC: 153 MG/DL (ref 50–200)
CO2 SERPL-SCNC: 29 MMOL/L (ref 21–32)
CREAT SERPL-MCNC: 0.81 MG/DL (ref 0.6–1.3)
EOSINOPHIL # BLD AUTO: 0.06 THOUSAND/ΜL (ref 0–0.61)
EOSINOPHIL NFR BLD AUTO: 2 % (ref 0–6)
ERYTHROCYTE [DISTWIDTH] IN BLOOD BY AUTOMATED COUNT: 11.6 % (ref 11.6–15.1)
FERRITIN SERPL-MCNC: 69 NG/ML (ref 8–388)
GFR SERPL CREATININE-BSD FRML MDRD: 83 ML/MIN/1.73SQ M
GLUCOSE P FAST SERPL-MCNC: 95 MG/DL (ref 65–99)
HCT VFR BLD AUTO: 43.5 % (ref 34.8–46.1)
HDLC SERPL-MCNC: 56 MG/DL (ref 40–60)
HGB BLD-MCNC: 14.9 G/DL (ref 11.5–15.4)
IMM GRANULOCYTES # BLD AUTO: 0 THOUSAND/UL (ref 0–0.2)
IMM GRANULOCYTES NFR BLD AUTO: 0 % (ref 0–2)
LDLC SERPL CALC-MCNC: 89 MG/DL (ref 0–100)
LYMPHOCYTES # BLD AUTO: 0.95 THOUSANDS/ΜL (ref 0.6–4.47)
LYMPHOCYTES NFR BLD AUTO: 26 % (ref 14–44)
MCH RBC QN AUTO: 33.5 PG (ref 26.8–34.3)
MCHC RBC AUTO-ENTMCNC: 34.3 G/DL (ref 31.4–37.4)
MCV RBC AUTO: 98 FL (ref 82–98)
MONOCYTES # BLD AUTO: 0.29 THOUSAND/ΜL (ref 0.17–1.22)
MONOCYTES NFR BLD AUTO: 8 % (ref 4–12)
NEUTROPHILS # BLD AUTO: 2.3 THOUSANDS/ΜL (ref 1.85–7.62)
NEUTS SEG NFR BLD AUTO: 63 % (ref 43–75)
NRBC BLD AUTO-RTO: 0 /100 WBCS
PLATELET # BLD AUTO: 176 THOUSANDS/UL (ref 149–390)
PMV BLD AUTO: 10.6 FL (ref 8.9–12.7)
POTASSIUM SERPL-SCNC: 4.2 MMOL/L (ref 3.5–5.3)
PROT SERPL-MCNC: 7.3 G/DL (ref 6.4–8.2)
RBC # BLD AUTO: 4.45 MILLION/UL (ref 3.81–5.12)
SODIUM SERPL-SCNC: 140 MMOL/L (ref 136–145)
T4 FREE SERPL-MCNC: 0.97 NG/DL (ref 0.76–1.46)
TRIGL SERPL-MCNC: 40 MG/DL
TSH SERPL DL<=0.05 MIU/L-ACNC: 0.97 UIU/ML (ref 0.36–3.74)
WBC # BLD AUTO: 3.63 THOUSAND/UL (ref 4.31–10.16)

## 2019-05-10 PROCEDURE — 99214 OFFICE O/P EST MOD 30 MIN: CPT | Performed by: FAMILY MEDICINE

## 2019-05-10 PROCEDURE — 80053 COMPREHEN METABOLIC PANEL: CPT

## 2019-05-10 PROCEDURE — 84443 ASSAY THYROID STIM HORMONE: CPT

## 2019-05-10 PROCEDURE — 36415 COLL VENOUS BLD VENIPUNCTURE: CPT

## 2019-05-10 PROCEDURE — 82728 ASSAY OF FERRITIN: CPT

## 2019-05-10 PROCEDURE — 85025 COMPLETE CBC W/AUTO DIFF WBC: CPT

## 2019-05-10 PROCEDURE — 84439 ASSAY OF FREE THYROXINE: CPT

## 2019-05-10 PROCEDURE — 80061 LIPID PANEL: CPT

## 2019-05-10 PROCEDURE — 90471 IMMUNIZATION ADMIN: CPT | Performed by: FAMILY MEDICINE

## 2019-05-10 PROCEDURE — 90715 TDAP VACCINE 7 YRS/> IM: CPT | Performed by: FAMILY MEDICINE

## 2019-05-10 RX ORDER — TRAZODONE HYDROCHLORIDE 50 MG/1
50 TABLET ORAL
Qty: 30 TABLET | Refills: 5 | Status: SHIPPED | OUTPATIENT
Start: 2019-05-10 | End: 2019-05-10 | Stop reason: SDUPTHER

## 2019-05-10 RX ORDER — TRAZODONE HYDROCHLORIDE 50 MG/1
25 TABLET ORAL
Qty: 30 TABLET | Refills: 5 | Status: SHIPPED | OUTPATIENT
Start: 2019-05-10 | End: 2019-06-03 | Stop reason: ALTCHOICE

## 2019-05-14 ENCOUNTER — TELEPHONE (OUTPATIENT)
Dept: OBGYN CLINIC | Facility: CLINIC | Age: 54
End: 2019-05-14

## 2019-05-20 ENCOUNTER — HOSPITAL ENCOUNTER (OUTPATIENT)
Dept: ULTRASOUND IMAGING | Facility: CLINIC | Age: 54
Discharge: HOME | End: 2019-05-20
Payer: OTHER GOVERNMENT

## 2019-05-20 ENCOUNTER — HOSPITAL ENCOUNTER (OUTPATIENT)
Dept: MAMMOGRAPHY | Facility: CLINIC | Age: 54
Discharge: HOME | End: 2019-05-20
Payer: OTHER GOVERNMENT

## 2019-05-20 VITALS — WEIGHT: 149 LBS | HEIGHT: 71 IN | BODY MASS INDEX: 20.86 KG/M2

## 2019-05-20 DIAGNOSIS — N63.10 BREAST MASS, RIGHT: ICD-10-CM

## 2019-05-20 PROCEDURE — G0279 TOMOSYNTHESIS, MAMMO: HCPCS

## 2019-05-20 PROCEDURE — 76642 ULTRASOUND BREAST LIMITED: CPT

## 2019-05-20 PROCEDURE — 77066 DX MAMMO INCL CAD BI: CPT

## 2019-05-30 ENCOUNTER — PROCEDURE VISIT (OUTPATIENT)
Dept: OBGYN CLINIC | Facility: CLINIC | Age: 54
End: 2019-05-30
Payer: OTHER GOVERNMENT

## 2019-05-30 VITALS
SYSTOLIC BLOOD PRESSURE: 114 MMHG | WEIGHT: 151 LBS | BODY MASS INDEX: 21.14 KG/M2 | DIASTOLIC BLOOD PRESSURE: 70 MMHG | HEIGHT: 71 IN

## 2019-05-30 DIAGNOSIS — R87.810 ASCUS WITH POSITIVE HIGH RISK HPV CERVICAL: ICD-10-CM

## 2019-05-30 DIAGNOSIS — R87.610 ASCUS WITH POSITIVE HIGH RISK HPV CERVICAL: ICD-10-CM

## 2019-05-30 PROCEDURE — 88305 TISSUE EXAM BY PATHOLOGIST: CPT | Performed by: PATHOLOGY

## 2019-05-30 PROCEDURE — 57454 BX/CURETT OF CERVIX W/SCOPE: CPT | Performed by: OBSTETRICS & GYNECOLOGY

## 2019-06-03 ENCOUNTER — OFFICE VISIT (OUTPATIENT)
Dept: FAMILY MEDICINE CLINIC | Facility: CLINIC | Age: 54
End: 2019-06-03
Payer: OTHER GOVERNMENT

## 2019-06-03 VITALS
SYSTOLIC BLOOD PRESSURE: 98 MMHG | TEMPERATURE: 97.6 F | DIASTOLIC BLOOD PRESSURE: 60 MMHG | HEART RATE: 55 BPM | BODY MASS INDEX: 21.28 KG/M2 | OXYGEN SATURATION: 99 % | WEIGHT: 152 LBS | HEIGHT: 71 IN

## 2019-06-03 DIAGNOSIS — F51.01 PRIMARY INSOMNIA: Primary | ICD-10-CM

## 2019-06-03 DIAGNOSIS — D50.9 IRON DEFICIENCY ANEMIA, UNSPECIFIED IRON DEFICIENCY ANEMIA TYPE: ICD-10-CM

## 2019-06-03 PROCEDURE — 99213 OFFICE O/P EST LOW 20 MIN: CPT | Performed by: FAMILY MEDICINE

## 2019-06-04 ENCOUNTER — TELEPHONE (OUTPATIENT)
Dept: OBGYN CLINIC | Facility: CLINIC | Age: 54
End: 2019-06-04

## 2020-01-15 ENCOUNTER — TRANSITIONAL CARE MANAGEMENT (OUTPATIENT)
Dept: FAMILY MEDICINE CLINIC | Facility: CLINIC | Age: 55
End: 2020-01-15

## 2020-01-20 ENCOUNTER — TRANSCRIBE ORDERS (OUTPATIENT)
Dept: ADMINISTRATIVE | Facility: HOSPITAL | Age: 55
End: 2020-01-20

## 2020-01-20 ENCOUNTER — TELEPHONE (OUTPATIENT)
Dept: FAMILY MEDICINE CLINIC | Facility: CLINIC | Age: 55
End: 2020-01-20

## 2020-01-29 ENCOUNTER — APPOINTMENT (OUTPATIENT)
Dept: RADIOLOGY | Facility: CLINIC | Age: 55
End: 2020-01-29
Payer: OTHER GOVERNMENT

## 2020-01-29 ENCOUNTER — OFFICE VISIT (OUTPATIENT)
Dept: FAMILY MEDICINE CLINIC | Facility: CLINIC | Age: 55
End: 2020-01-29
Payer: OTHER GOVERNMENT

## 2020-01-29 VITALS
RESPIRATION RATE: 12 BRPM | HEART RATE: 60 BPM | HEIGHT: 71 IN | WEIGHT: 142 LBS | BODY MASS INDEX: 19.88 KG/M2 | TEMPERATURE: 96.9 F | SYSTOLIC BLOOD PRESSURE: 96 MMHG | DIASTOLIC BLOOD PRESSURE: 60 MMHG

## 2020-01-29 DIAGNOSIS — S93.601A SPRAIN OF RIGHT FOOT, INITIAL ENCOUNTER: ICD-10-CM

## 2020-01-29 DIAGNOSIS — R00.1 BRADYCARDIA: ICD-10-CM

## 2020-01-29 DIAGNOSIS — S93.401A SPRAIN OF RIGHT ANKLE, UNSPECIFIED LIGAMENT, INITIAL ENCOUNTER: ICD-10-CM

## 2020-01-29 DIAGNOSIS — S62.609B OPEN FRACTURE DISLOCATION OF DISTAL INTERPHALANGEAL (DIP) JOINT OF FINGER: Primary | ICD-10-CM

## 2020-01-29 PROCEDURE — 73610 X-RAY EXAM OF ANKLE: CPT

## 2020-01-29 PROCEDURE — 99214 OFFICE O/P EST MOD 30 MIN: CPT | Performed by: FAMILY MEDICINE

## 2020-01-29 PROCEDURE — 73630 X-RAY EXAM OF FOOT: CPT

## 2020-01-29 RX ORDER — MULTIVITAMIN
1 TABLET ORAL DAILY
COMMUNITY

## 2020-01-29 NOTE — PROGRESS NOTES
Assessment/Plan:  1  Status post repair of open fracture of the distal interphalangeal joint of the right 3rd finger-the patient will continue follow-up with Orthopedics as scheduled  She would like to get a 2nd opinion so we also referred her to a hand surgeon at [place] [address] for further evaluation  We will monitor her closely  2  Sprain of the right ankle and right foot-the patient will go for the x-rays as ordered and will follow up with the results  We will have her wear an air cast for 2 weeks  She was instructed to take it off at night to sleep  She should keep the leg elevated alternate between ice and heat to the area  She should try to rest it  She can continue with ibuprofen as needed for inflammation  We will monitor closely and will follow-up as scheduled  Diagnoses and all orders for this visit:    Open fracture dislocation of distal interphalangeal (DIP) joint of finger  -     Ambulatory referral to Hand Surgery; Future    Sprain of right ankle, unspecified ligament, initial encounter  -     XR ankle 3+ vw right; Future  -     XR foot 3+ vw right; Future  -     Elastic Bandages & Supports (AIRCAST AIRSPORT ANKLE BRACE) MISC; by Does not apply route daily Right ankle  -     Elastic Bandages & Supports (AIRCAST AIRSPORT ANKLE BRACE) MISC; by Does not apply route daily For right ankle    Sprain of right foot, initial encounter  -     XR ankle 3+ vw right; Future  -     XR foot 3+ vw right; Future  -     Elastic Bandages & Supports (AIRCAST AIRSPORT ANKLE BRACE) MISC; by Does not apply route daily Right ankle  -     Elastic Bandages & Supports (AIRCAST AIRSPORT ANKLE BRACE) MISC; by Does not apply route daily For right ankle    Bradycardia  -     Echo complete with contrast if indicated; Future  -     Ambulatory referral to Cardiology; Future    Other orders  -     Multiple Vitamin (MULTIVITAMIN) tablet; Take 1 tablet by mouth daily       Return if symptoms worsen or fail to improve  Subjective:   Chief Complaint   Patient presents with    Follow-up     hand surgery -- also having right foot pain        Patient ID: Álvaro Myles is a 53 y o  female presents today for [reason]  Álvaro Myles is a 53 y o  female who presents today for a follow-up from recent hospitalization from 1/13/2020 until 1/15/2020 following an open fracture and dislocation of the PIP joint of the right 3rd digit after being caught in a   Hand surgeon was consulted she was taken to the or on 11/14/2020 for right long finger debridement of open fracture exploration & Neurolysis ulnar digit nerve; orif middle phalanx fracture, closed treatment distal phalanx fracture, extensor tendon repair  Post op in pacu she was bradycardic into the 30s no heart blocks present, evaluated by cardiology, asymptomatic, now in 40-50s with low of 35 still asymptomatic  tsh wnl  recommend fu as op with op echocardiogram  Fu with dr Leigha Jiménez in am appointment already made  To complete 1 week of augmentin, oxycodone for pain, recommended elevation of hand as much as possible, can alternate motrin q8h as well  She had also fallen the day of the initial injury and injured her right foot and ankle- they x-rayed the ankle in the ER and there was no fracture  She still has pain and swelling in the right foot and ankle  There is no numbness or tingling in the right foot and ankle  There is pain with standing on it  Her finger is doing better and she is going for physical therapy  She was advised to get an ECHO and see a cardiologist for evaluation of bradycardia  The patient denies any chest pain, shortness of breath, or palpitations  There is no edema  There are no headaches or visual changes  There is no lightheadedness, dizziness, or fainting spells  There are no further fainting spells  She is seeing [facility] back tomorrow for a followup  There were no previous injuries        The following portions of the patient's history were reviewed and updated as appropriate: allergies, current medications, past family history, past medical history, past social history, past surgical history and problem list   Patient Active Problem List   Diagnosis    Menorrhagia    Anemia    Cyst of left ovary    Dense breasts    Fibroids    Multiple nevi    Age-related incipient cataract of both eyes    Primary insomnia    Open fracture dislocation of distal interphalangeal (DIP) joint of finger     Past Medical History:   Diagnosis Date    Abnormal Pap smear of cervix     Anemia     Atypical nevus of scalp     last assessed 5/11/17     Basal cell carcinoma of scalp     last assessed 5/11/17     Breast lump     last assessed 5/12/14     Cancer ([facility] [address] )     BCC-left nose    Cervical polyp     Fibroid     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    Leukopenia     Menorrhagia     last assessed 5/12/14     Metrorrhagia     last assessed 10/22/15     Ovarian cyst     left    Urinary tract infection      Past Surgical History:   Procedure Laterality Date    BREAST BIOPSY Right 2011    percutan needle core use imag guide stereotactic / benign    BREAST SURGERY Right     bx    COLONOSCOPY      SC EXC SKIN BENIG >4 CM REMAINDR BODY N/A 1/30/2017    Procedure: SCALP SUSPICIOUS LESION EXCISION;  Surgeon: Pamela Delgadillo MD;  Location: [facility];  Service: Plastics    SC RECMPL WND SCALP,EXTR 2 6-7 5 CM N/A 1/30/2017    Procedure: COMPLEX CLOSURE;  Surgeon: Pamela Delgadillo MD;  [facility];  Service: Plastics    SKIN BIOPSY      basal cell ca    SKIN CANCER EXCISION      nose     No Known Allergies  Family History   Problem Relation Age of Onset    Breast cancer Mother 39    Heart disease Maternal Grandmother     Heart disease Paternal Grandmother     Heart disease Paternal Grandfather     Bone cancer Father 61    No Known Problems Maternal Aunt     No Known Problems Paternal Aunt     No Known Problems Paternal Aunt     No Known Problems Paternal Aunt     No Known Problems Paternal Aunt     No Known Problems Paternal Aunt     Breast cancer Cousin 48    Bone cancer Cousin 48     Social History     Socioeconomic History    Marital status: /Civil Union     Spouse name: Not on file    Number of children: Not on file    Years of education: Not on file    Highest education level: Not on file   Occupational History    Occupation:     Social Needs    Financial resource strain: Not on file    Food insecurity:     Worry: Not on file     Inability: Not on file    Transportation needs:     Medical: Not on file     Non-medical: Not on file   Tobacco Use    Smoking status: Never Smoker    Smokeless tobacco: Never Used   Substance and Sexual Activity    Alcohol use: No     Comment: social drinker per allscript     Drug use: No    Sexual activity: Yes     Birth control/protection: Spermicide   Lifestyle    Physical activity:     Days per week: Not on file     Minutes per session: Not on file    Stress: Not on file   Relationships    Social connections:     Talks on phone: Not on file     Gets together: Not on file     Attends Cheondoism service: Not on file     Active member of club or organization: Not on file     Attends meetings of clubs or organizations: Not on file     Relationship status: Not on file    Intimate partner violence:     Fear of current or ex partner: Not on file     Emotionally abused: Not on file     Physically abused: Not on file     Forced sexual activity: Not on file   Other Topics Concern    Not on file   Social History Narrative    Caffeine use     Religion affiliation [employer]     Uses safety equipment seatbelts      Current Outpatient Medications on File Prior to Visit   Medication Sig Dispense Refill    IRON PO Take by mouth      Multiple Vitamin (MULTIVITAMIN) tablet Take 1 tablet by mouth daily       No current facility-administered medications on file prior to visit  Review of Systems   Constitutional: Negative  HENT: Negative  Eyes: Negative  Respiratory: Negative  Cardiovascular: Negative  Gastrointestinal: Negative  Endocrine: Negative  Genitourinary: Negative  Musculoskeletal: Positive for arthralgias  Skin: Negative  Allergic/Immunologic: Negative  Neurological: Negative  Hematological: Negative  Psychiatric/Behavioral: Negative  Objective:  Vitals:    01/29/20 1032   BP: 96/60   BP Location: Left arm   Patient Position: Sitting   Cuff Size: Standard   Pulse: 60   Resp: 12   Temp: (!) 96 9 °F (36 1 °C)   TempSrc: Tympanic   Weight: 64 4 kg (142 lb)   Height: 5' 10 5" (1 791 m)     Body mass index is 20 09 kg/m²  Physical Exam   Constitutional: She is oriented to person, place, and time  She appears well-developed and well-nourished  HENT:   Head: Normocephalic and atraumatic  Mouth/Throat: No oropharyngeal exudate  Eyes: Pupils are equal, round, and reactive to light  Conjunctivae and EOM are normal    Neck: Normal range of motion  No JVD present  No tracheal deviation present  No thyromegaly present  Cardiovascular: Normal rate, regular rhythm, normal heart sounds and intact distal pulses  Exam reveals no gallop and no friction rub  No murmur heard  Pulmonary/Chest: Effort normal and breath sounds normal  No stridor  No respiratory distress  She has no wheezes  She has no rales  She exhibits no tenderness  Abdominal: Soft  Bowel sounds are normal  She exhibits no distension and no mass  There is no tenderness  There is no rebound and no guarding  Musculoskeletal: She exhibits no edema or deformity  Right ankle: She exhibits decreased range of motion and swelling  She exhibits no ecchymosis, no deformity, no laceration and normal pulse  Tenderness  Lateral malleolus tenderness found          Right foot: There is decreased range of motion, tenderness, bony tenderness and swelling  There is normal capillary refill, no crepitus, no deformity and no laceration  Patient's right hand cannot be examined at this point because she is currently in a splint  Lymphadenopathy:     She has no cervical adenopathy  Neurological: She is alert and oriented to person, place, and time  She has normal reflexes  She displays normal reflexes  No cranial nerve deficit  She exhibits normal muscle tone  Coordination normal    Skin: Skin is warm and dry

## 2020-01-31 ENCOUNTER — TELEPHONE (OUTPATIENT)
Dept: FAMILY MEDICINE CLINIC | Facility: CLINIC | Age: 55
End: 2020-01-31

## 2020-01-31 NOTE — TELEPHONE ENCOUNTER
Patient called, she picked up her splint  She thought it was suppose to be padded, but she said the one she purchased isn't well padded  She wanted me to check with you to see if this is ok? ?  I told her I would check and someone can call her back  [phone]   Thank you

## 2020-01-31 NOTE — TELEPHONE ENCOUNTER
I spoke with the patient on 1/31/2020  She described to me what her Aircast look like and I reassured her that this was the appropriate splint  She will use it as prescribed  In addition, I again reviewed the results of the x-rays with her and advised her that there were no acute findings no fractures  Will see her back as scheduled

## 2020-02-03 ENCOUNTER — TELEPHONE (OUTPATIENT)
Dept: OBGYN CLINIC | Facility: CLINIC | Age: 55
End: 2020-02-03

## 2020-02-03 NOTE — TELEPHONE ENCOUNTER
Received a referral from pt's PCP stating that pt would like to see Dr Jennifer Viera for a Second Opinion (pt had recent surgery)  Please see referral in system  LMOM for pt to call back regarding an appt

## 2020-02-17 ENCOUNTER — TELEPHONE (OUTPATIENT)
Dept: FAMILY MEDICINE CLINIC | Facility: CLINIC | Age: 55
End: 2020-02-17

## 2020-02-20 ENCOUNTER — TELEPHONE (OUTPATIENT)
Dept: FAMILY MEDICINE CLINIC | Facility: CLINIC | Age: 55
End: 2020-02-20

## 2020-02-20 DIAGNOSIS — N39.0 URINARY TRACT INFECTION WITHOUT HEMATURIA, SITE UNSPECIFIED: Primary | ICD-10-CM

## 2020-02-20 NOTE — TELEPHONE ENCOUNTER
Patient called  She was seen at urgent care for a UTI  She said that she is suppose to have her urine test redone  She didn't know if she needed to be seen first?  I temporarily scheduled an appt for her for next week     I told her I would check with you and I can call her back  [phone]  Thank you

## 2020-02-27 ENCOUNTER — APPOINTMENT (OUTPATIENT)
Dept: LAB | Facility: CLINIC | Age: 55
End: 2020-02-27
Payer: OTHER GOVERNMENT

## 2020-02-27 DIAGNOSIS — N39.0 URINARY TRACT INFECTION WITHOUT HEMATURIA, SITE UNSPECIFIED: ICD-10-CM

## 2020-02-27 LAB
BACTERIA UR QL AUTO: ABNORMAL /HPF
BILIRUB UR QL STRIP: NEGATIVE
CLARITY UR: ABNORMAL
COLOR UR: YELLOW
GLUCOSE UR STRIP-MCNC: NEGATIVE MG/DL
HGB UR QL STRIP.AUTO: NEGATIVE
KETONES UR STRIP-MCNC: NEGATIVE MG/DL
LEUKOCYTE ESTERASE UR QL STRIP: ABNORMAL
NITRITE UR QL STRIP: NEGATIVE
NON-SQ EPI CELLS URNS QL MICRO: ABNORMAL /HPF
PH UR STRIP.AUTO: 7 [PH]
PROT UR STRIP-MCNC: NEGATIVE MG/DL
RBC #/AREA URNS AUTO: ABNORMAL /HPF
SP GR UR STRIP.AUTO: 1.01 (ref 1–1.03)
UROBILINOGEN UR QL STRIP.AUTO: 0.2 E.U./DL
WBC #/AREA URNS AUTO: ABNORMAL /HPF

## 2020-02-27 PROCEDURE — 81001 URINALYSIS AUTO W/SCOPE: CPT

## 2020-02-27 PROCEDURE — 87086 URINE CULTURE/COLONY COUNT: CPT

## 2020-02-28 ENCOUNTER — TELEPHONE (OUTPATIENT)
Dept: FAMILY MEDICINE CLINIC | Facility: CLINIC | Age: 55
End: 2020-02-28

## 2020-02-28 LAB — BACTERIA UR CULT: NORMAL

## 2020-03-19 ENCOUNTER — TELEPHONE (OUTPATIENT)
Dept: OBGYN CLINIC | Facility: HOSPITAL | Age: 55
End: 2020-03-19

## 2020-03-19 NOTE — TELEPHONE ENCOUNTER
Patient is calling to schedule a second opinion from a hand sx on 1/14/20  All records appear to be in the patient's chart  Please review & advise if the patient can be seen & when approx she should be scheduled  Patient would like to be seen in the [facility] office        [phone]

## 2020-03-25 ENCOUNTER — OFFICE VISIT (OUTPATIENT)
Dept: PODIATRY | Facility: CLINIC | Age: 55
End: 2020-03-25
Payer: OTHER GOVERNMENT

## 2020-03-25 VITALS
SYSTOLIC BLOOD PRESSURE: 100 MMHG | BODY MASS INDEX: 19.88 KG/M2 | HEIGHT: 71 IN | DIASTOLIC BLOOD PRESSURE: 65 MMHG | WEIGHT: 142 LBS

## 2020-03-25 DIAGNOSIS — S93.401A SPRAIN OF RIGHT ANKLE, UNSPECIFIED LIGAMENT, INITIAL ENCOUNTER: Primary | ICD-10-CM

## 2020-03-25 DIAGNOSIS — M25.571 PAIN AND SWELLING OF RIGHT ANKLE: ICD-10-CM

## 2020-03-25 DIAGNOSIS — M25.471 PAIN AND SWELLING OF RIGHT ANKLE: ICD-10-CM

## 2020-03-25 DIAGNOSIS — S86.311A STRAIN OF PERONEAL TENDON, RIGHT, INITIAL ENCOUNTER: ICD-10-CM

## 2020-03-25 PROCEDURE — 3008F BODY MASS INDEX DOCD: CPT | Performed by: PODIATRIST

## 2020-03-25 PROCEDURE — 1036F TOBACCO NON-USER: CPT | Performed by: PODIATRIST

## 2020-03-25 PROCEDURE — 99203 OFFICE O/P NEW LOW 30 MIN: CPT | Performed by: PODIATRIST

## 2020-03-25 RX ORDER — MELOXICAM 15 MG/1
15 TABLET ORAL DAILY
Qty: 30 TABLET | Refills: 0 | Status: SHIPPED | OUTPATIENT
Start: 2020-03-25 | End: 2020-08-03 | Stop reason: ALTCHOICE

## 2020-03-25 NOTE — PROGRESS NOTES
PATIENT:  Saran Palomo  1965       ASSESSMENT:     1  Sprain of right ankle, unspecified ligament, initial encounter  Cam Boot   2  Strain of peroneal tendon, right, initial encounter  Cam Boot    meloxicam (MOBIC) 15 mg tablet   3  Pain and swelling of right ankle               PLAN:  Patient was counseled and educated on the condition and the diagnosis  X-ray was personally reviewed  The radiological findings were discussed with the patient  The exam and symptoms are consistent with right ankle sprain and peroneal tendon injury  The diagnosis, treatment options and prognosis were discussed with the patient  She probably was under-treated and continues to have pain 2 months after the injury  Will start her on CAM walker  Meloxicam for 10-14 days  Instructed supportive care, stretching exercise, and icing  Discussed possible further diagnostics and / or PT depending on the progress  Patient will return in 3 weeks for re-evaluation  Subjective:       HPI  The patient presents with chief complaint of right ankle pain  She fell and rolled her right ankle 2 months ago  She still experiences some throbbing sensation on right ankle  Initially she had pain around the heel, but it seemed to move to lateral ankle  Pain level is about 5-6 out of 10  Some areas of right ankle feel worse since the injury  She had swelling to right ankle, but it had been better  She was using aircast on and off because it was rubbing her ankle  Pain is worse with walking, but she still feels some pain at resting  She also tried Ibuprofen on and off with mild relief  No associated numbness or paresthesia  No significant weakness           The following portions of the patient's history were reviewed and updated as appropriate: allergies, current medications, past family history, past medical history, past social history, past surgical history and problem list   All pertinent labs and images were reviewed  Past Medical History  Past Medical History:   Diagnosis Date    Abnormal Pap smear of cervix     Anemia     Atypical nevus of scalp     last assessed 5/11/17     Basal cell carcinoma of scalp     last assessed 5/11/17     Breast lump     last assessed 5/12/14     Cancer (HCC)     BCC-left nose    Cervical polyp     Fibroid     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    Leukopenia     Menorrhagia     last assessed 5/12/14     Metrorrhagia     last assessed 10/22/15     Ovarian cyst     left    Urinary tract infection        Past Surgical History  Past Surgical History:   Procedure Laterality Date    BREAST BIOPSY Right 2011    percutan needle core use imag guide stereotactic / benign    BREAST SURGERY Right     bx    COLONOSCOPY      IA EXC SKIN BENIG >4 CM REMAINDR BODY N/A 1/30/2017    Procedure: SCALP SUSPICIOUS LESION EXCISION;  Surgeon: Abdi Barber MD;  Location: [facility];  Service: Plastics    IA RECMPL WND SCALP,EXTR 2 6-7 5 CM N/A 1/30/2017    Procedure: COMPLEX CLOSURE;  Surgeon: Abdi Barber MD;  Location: [facility];  Service: Plastics    SKIN BIOPSY      basal cell ca    SKIN CANCER EXCISION      nose        Allergies:  Patient has no known allergies  Medications:  Current Outpatient Medications   Medication Sig Dispense Refill    Elastic Bandages & Supports (AIRCAST AIRSPORT ANKLE BRACE) MISC by Does not apply route daily Right ankle 1 each 0    Elastic Bandages & Supports (AIRCAST AIRSPORT ANKLE BRACE) MISC by Does not apply route daily For right ankle 1 each 0    IRON PO Take by mouth      meloxicam (MOBIC) 15 mg tablet Take 1 tablet (15 mg total) by mouth daily after meal 30 tablet 0    Multiple Vitamin (MULTIVITAMIN) tablet Take 1 tablet by mouth daily       No current facility-administered medications for this visit          Social History:  Social History     Socioeconomic History    Marital status: /Civil Union     Spouse name: None    Number of children: None    Years of education: None    Highest education level: None   Occupational History    Occupation:     Social Needs    Financial resource strain: None    Food insecurity:     Worry: None     Inability: None    Transportation needs:     Medical: None     Non-medical: None   Tobacco Use    Smoking status: Never Smoker    Smokeless tobacco: Never Used   Substance and Sexual Activity    Alcohol use: No     Comment: social drinker per allscript     Drug use: No    Sexual activity: Yes     Birth control/protection: Spermicide   Lifestyle    Physical activity:     Days per week: None     Minutes per session: None    Stress: None   Relationships    Social connections:     Talks on phone: None     Gets together: None     Attends Restoration service: None     Active member of club or organization: None     Attends meetings of clubs or organizations: None     Relationship status: None    Intimate partner violence:     Fear of current or ex partner: None     Emotionally abused: None     Physically abused: None     Forced sexual activity: None   Other Topics Concern    None   Social History Narrative    Caffeine use     Christianity affiliation Buddhist Anabaptist disciples of mohit     Uses safety equipment seatbelts           Review of Systems   Constitutional: Negative for chills and fever  HENT: Negative for sore throat  Respiratory: Negative for cough and shortness of breath  Cardiovascular: Negative for chest pain  Gastrointestinal: Negative for diarrhea, nausea and vomiting  Genitourinary: Negative for hematuria  Skin: Negative for color change and wound  Allergic/Immunologic: Negative for immunocompromised state  Neurological: Negative for weakness and numbness  Hematological: Does not bruise/bleed easily  Psychiatric/Behavioral: Negative for behavioral problems           Objective:      /65   Ht 5' 10 5" (1 791 m)   Wt 64 4 kg (142 lb)   BMI 20 09 kg/m²          Physical Exam   Constitutional: She is oriented to person, place, and time  She appears well-developed and well-nourished  No distress  HENT:   Head: Normocephalic and atraumatic  Neck: Normal range of motion  Neck supple  Cardiovascular: Normal rate, regular rhythm and intact distal pulses  Pulmonary/Chest: Effort normal and breath sounds normal  No respiratory distress  Musculoskeletal: Normal range of motion  She exhibits no deformity  Slight fullness right lateral ankle  Right ankle ROM is WNL without significant pain  Pain presents around right ATFL and peroneal tendon  MMT is WNL  Anterior drawer test is negative right ankle  No Tinel sign right foot  Neurological: She is alert and oriented to person, place, and time  She displays normal reflexes  No cranial nerve deficit or sensory deficit  Skin: Skin is intact  Capillary refill takes less than 2 seconds  No ecchymosis, no petechiae and no rash noted  No erythema  Psychiatric: She has a normal mood and affect  Her behavior is normal    Vitals reviewed

## 2020-04-01 NOTE — TELEPHONE ENCOUNTER
Called patient to schedule appointment, work is patient's preferred number and work is closed so I tried other number in patient's chart and the number has either been changed or disconnected

## 2020-04-16 ENCOUNTER — OFFICE VISIT (OUTPATIENT)
Dept: PODIATRY | Facility: CLINIC | Age: 55
End: 2020-04-16
Payer: OTHER GOVERNMENT

## 2020-04-16 VITALS — HEIGHT: 71 IN | BODY MASS INDEX: 19.88 KG/M2 | WEIGHT: 142 LBS

## 2020-04-16 DIAGNOSIS — S93.401A SPRAIN OF RIGHT ANKLE, UNSPECIFIED LIGAMENT, INITIAL ENCOUNTER: Primary | ICD-10-CM

## 2020-04-16 DIAGNOSIS — S96.291A OTHER SPECIFIED INJURY OF INTRINSIC MUSCLE AND TENDON AT ANKLE AND FOOT LEVEL, RIGHT FOOT, INITIAL ENCOUNTER: ICD-10-CM

## 2020-04-16 DIAGNOSIS — M25.571 PAIN AND SWELLING OF RIGHT ANKLE: ICD-10-CM

## 2020-04-16 DIAGNOSIS — S86.311A STRAIN OF PERONEAL TENDON, RIGHT, INITIAL ENCOUNTER: ICD-10-CM

## 2020-04-16 DIAGNOSIS — M25.471 PAIN AND SWELLING OF RIGHT ANKLE: ICD-10-CM

## 2020-04-16 PROCEDURE — 99214 OFFICE O/P EST MOD 30 MIN: CPT | Performed by: PODIATRIST

## 2020-04-16 PROCEDURE — 1036F TOBACCO NON-USER: CPT | Performed by: PODIATRIST

## 2020-04-16 PROCEDURE — 3008F BODY MASS INDEX DOCD: CPT | Performed by: PODIATRIST

## 2020-04-21 ENCOUNTER — HOSPITAL ENCOUNTER (OUTPATIENT)
Dept: MRI IMAGING | Facility: HOSPITAL | Age: 55
Discharge: HOME | End: 2020-04-21
Attending: PODIATRIST
Payer: OTHER GOVERNMENT

## 2020-04-21 DIAGNOSIS — S96.291A OTHER SPECIFIED INJURY OF INTRINSIC MUSCLE AND TENDON AT ANKLE AND FOOT LEVEL, RIGHT FOOT, INITIAL ENCOUNTER: ICD-10-CM

## 2020-04-21 DIAGNOSIS — S93.401A SPRAIN OF RIGHT ANKLE, UNSPECIFIED LIGAMENT, INITIAL ENCOUNTER: ICD-10-CM

## 2020-04-21 DIAGNOSIS — S86.311A STRAIN OF PERONEAL TENDON, RIGHT, INITIAL ENCOUNTER: ICD-10-CM

## 2020-04-21 PROCEDURE — 73721 MRI JNT OF LWR EXTRE W/O DYE: CPT

## 2020-04-23 ENCOUNTER — TELEPHONE (OUTPATIENT)
Dept: PODIATRY | Facility: CLINIC | Age: 55
End: 2020-04-23

## 2020-04-27 ENCOUNTER — TELEPHONE (OUTPATIENT)
Dept: PODIATRY | Facility: CLINIC | Age: 55
End: 2020-04-27

## 2020-04-27 ENCOUNTER — CONSULT (OUTPATIENT)
Dept: OBGYN CLINIC | Facility: CLINIC | Age: 55
End: 2020-04-27
Payer: OTHER GOVERNMENT

## 2020-04-27 ENCOUNTER — APPOINTMENT (OUTPATIENT)
Dept: RADIOLOGY | Facility: CLINIC | Age: 55
End: 2020-04-27
Payer: OTHER GOVERNMENT

## 2020-04-27 VITALS
HEIGHT: 71 IN | WEIGHT: 140 LBS | BODY MASS INDEX: 19.6 KG/M2 | DIASTOLIC BLOOD PRESSURE: 68 MMHG | SYSTOLIC BLOOD PRESSURE: 112 MMHG

## 2020-04-27 DIAGNOSIS — M79.644 FINGER PAIN, RIGHT: ICD-10-CM

## 2020-04-27 DIAGNOSIS — M79.644 FINGER PAIN, RIGHT: Primary | ICD-10-CM

## 2020-04-27 PROCEDURE — 3008F BODY MASS INDEX DOCD: CPT | Performed by: ORTHOPAEDIC SURGERY

## 2020-04-27 PROCEDURE — 99205 OFFICE O/P NEW HI 60 MIN: CPT | Performed by: ORTHOPAEDIC SURGERY

## 2020-04-27 PROCEDURE — 73140 X-RAY EXAM OF FINGER(S): CPT

## 2020-04-27 PROCEDURE — 1036F TOBACCO NON-USER: CPT | Performed by: ORTHOPAEDIC SURGERY

## 2020-05-05 DIAGNOSIS — S96.291A OTHER SPECIFIED INJURY OF INTRINSIC MUSCLE AND TENDON AT ANKLE AND FOOT LEVEL, RIGHT FOOT, INITIAL ENCOUNTER: ICD-10-CM

## 2020-05-05 DIAGNOSIS — S93.401A SPRAIN OF RIGHT ANKLE, UNSPECIFIED LIGAMENT, INITIAL ENCOUNTER: Primary | ICD-10-CM

## 2020-05-07 ENCOUNTER — TELEPHONE (OUTPATIENT)
Dept: PODIATRY | Facility: CLINIC | Age: 55
End: 2020-05-07

## 2020-05-11 ENCOUNTER — EVALUATION (OUTPATIENT)
Dept: OCCUPATIONAL THERAPY | Facility: CLINIC | Age: 55
End: 2020-05-11
Payer: OTHER GOVERNMENT

## 2020-05-11 ENCOUNTER — OFFICE VISIT (OUTPATIENT)
Dept: PODIATRY | Facility: CLINIC | Age: 55
End: 2020-05-11
Payer: OTHER GOVERNMENT

## 2020-05-11 VITALS — WEIGHT: 140 LBS | HEIGHT: 71 IN | BODY MASS INDEX: 19.6 KG/M2

## 2020-05-11 DIAGNOSIS — S62.609B OPEN FRACTURE DISLOCATION OF DISTAL INTERPHALANGEAL (DIP) JOINT OF FINGER: Primary | ICD-10-CM

## 2020-05-11 DIAGNOSIS — S96.291A OTHER SPECIFIED INJURY OF INTRINSIC MUSCLE AND TENDON AT ANKLE AND FOOT LEVEL, RIGHT FOOT, INITIAL ENCOUNTER: ICD-10-CM

## 2020-05-11 DIAGNOSIS — M72.2 PLANTAR FASCIITIS: ICD-10-CM

## 2020-05-11 DIAGNOSIS — S93.401A SPRAIN OF RIGHT ANKLE, UNSPECIFIED LIGAMENT, INITIAL ENCOUNTER: Primary | ICD-10-CM

## 2020-05-11 PROCEDURE — 97140 MANUAL THERAPY 1/> REGIONS: CPT | Performed by: OCCUPATIONAL THERAPIST

## 2020-05-11 PROCEDURE — 99213 OFFICE O/P EST LOW 20 MIN: CPT | Performed by: PODIATRIST

## 2020-05-11 PROCEDURE — 97165 OT EVAL LOW COMPLEX 30 MIN: CPT | Performed by: OCCUPATIONAL THERAPIST

## 2020-05-11 PROCEDURE — 1036F TOBACCO NON-USER: CPT | Performed by: PODIATRIST

## 2020-05-11 PROCEDURE — 3008F BODY MASS INDEX DOCD: CPT | Performed by: PODIATRIST

## 2020-05-15 ENCOUNTER — EVALUATION (OUTPATIENT)
Dept: PHYSICAL THERAPY | Facility: CLINIC | Age: 55
End: 2020-05-15
Payer: OTHER GOVERNMENT

## 2020-05-15 DIAGNOSIS — S96.291D OTHER SPECIFIED INJURY OF INTRINSIC MUSCLE AND TENDON AT ANKLE AND FOOT LEVEL, RIGHT FOOT, SUBSEQUENT ENCOUNTER: ICD-10-CM

## 2020-05-15 DIAGNOSIS — S93.401D SPRAIN OF LIGAMENT OF RIGHT ANKLE, SUBSEQUENT ENCOUNTER: ICD-10-CM

## 2020-05-15 PROCEDURE — 97140 MANUAL THERAPY 1/> REGIONS: CPT | Performed by: PHYSICAL THERAPIST

## 2020-05-15 PROCEDURE — 97161 PT EVAL LOW COMPLEX 20 MIN: CPT | Performed by: PHYSICAL THERAPIST

## 2020-05-19 ENCOUNTER — OFFICE VISIT (OUTPATIENT)
Dept: PHYSICAL THERAPY | Facility: CLINIC | Age: 55
End: 2020-05-19
Payer: OTHER GOVERNMENT

## 2020-05-19 ENCOUNTER — OFFICE VISIT (OUTPATIENT)
Dept: OCCUPATIONAL THERAPY | Facility: CLINIC | Age: 55
End: 2020-05-19
Payer: OTHER GOVERNMENT

## 2020-05-19 DIAGNOSIS — S96.291D OTHER SPECIFIED INJURY OF INTRINSIC MUSCLE AND TENDON AT ANKLE AND FOOT LEVEL, RIGHT FOOT, SUBSEQUENT ENCOUNTER: ICD-10-CM

## 2020-05-19 DIAGNOSIS — S62.609B OPEN FRACTURE DISLOCATION OF DISTAL INTERPHALANGEAL (DIP) JOINT OF FINGER: Primary | ICD-10-CM

## 2020-05-19 DIAGNOSIS — S93.401D SPRAIN OF LIGAMENT OF RIGHT ANKLE, SUBSEQUENT ENCOUNTER: Primary | ICD-10-CM

## 2020-05-19 PROCEDURE — 97110 THERAPEUTIC EXERCISES: CPT

## 2020-05-19 PROCEDURE — 97110 THERAPEUTIC EXERCISES: CPT | Performed by: OCCUPATIONAL THERAPIST

## 2020-05-19 PROCEDURE — 97140 MANUAL THERAPY 1/> REGIONS: CPT | Performed by: OCCUPATIONAL THERAPIST

## 2020-05-19 PROCEDURE — 97140 MANUAL THERAPY 1/> REGIONS: CPT

## 2020-05-21 ENCOUNTER — OFFICE VISIT (OUTPATIENT)
Dept: PHYSICAL THERAPY | Facility: CLINIC | Age: 55
End: 2020-05-21
Payer: OTHER GOVERNMENT

## 2020-05-21 ENCOUNTER — OFFICE VISIT (OUTPATIENT)
Dept: OCCUPATIONAL THERAPY | Facility: CLINIC | Age: 55
End: 2020-05-21
Payer: OTHER GOVERNMENT

## 2020-05-21 DIAGNOSIS — S96.291D OTHER SPECIFIED INJURY OF INTRINSIC MUSCLE AND TENDON AT ANKLE AND FOOT LEVEL, RIGHT FOOT, SUBSEQUENT ENCOUNTER: ICD-10-CM

## 2020-05-21 DIAGNOSIS — S93.401D SPRAIN OF LIGAMENT OF RIGHT ANKLE, SUBSEQUENT ENCOUNTER: Primary | ICD-10-CM

## 2020-05-21 DIAGNOSIS — S62.609B OPEN FRACTURE DISLOCATION OF DISTAL INTERPHALANGEAL (DIP) JOINT OF FINGER: Primary | ICD-10-CM

## 2020-05-21 PROCEDURE — 97140 MANUAL THERAPY 1/> REGIONS: CPT | Performed by: OCCUPATIONAL THERAPIST

## 2020-05-21 PROCEDURE — 97110 THERAPEUTIC EXERCISES: CPT | Performed by: PHYSICAL THERAPIST

## 2020-05-21 PROCEDURE — 97110 THERAPEUTIC EXERCISES: CPT | Performed by: OCCUPATIONAL THERAPIST

## 2020-05-21 PROCEDURE — 97112 NEUROMUSCULAR REEDUCATION: CPT | Performed by: PHYSICAL THERAPIST

## 2020-05-21 PROCEDURE — 97140 MANUAL THERAPY 1/> REGIONS: CPT | Performed by: PHYSICAL THERAPIST

## 2020-05-26 ENCOUNTER — OFFICE VISIT (OUTPATIENT)
Dept: PHYSICAL THERAPY | Facility: CLINIC | Age: 55
End: 2020-05-26
Payer: OTHER GOVERNMENT

## 2020-05-26 ENCOUNTER — OFFICE VISIT (OUTPATIENT)
Dept: OCCUPATIONAL THERAPY | Facility: CLINIC | Age: 55
End: 2020-05-26
Payer: OTHER GOVERNMENT

## 2020-05-26 DIAGNOSIS — S62.609B OPEN FRACTURE DISLOCATION OF DISTAL INTERPHALANGEAL (DIP) JOINT OF FINGER: Primary | ICD-10-CM

## 2020-05-26 DIAGNOSIS — S93.401D SPRAIN OF LIGAMENT OF RIGHT ANKLE, SUBSEQUENT ENCOUNTER: Primary | ICD-10-CM

## 2020-05-26 PROCEDURE — 97110 THERAPEUTIC EXERCISES: CPT | Performed by: OCCUPATIONAL THERAPIST

## 2020-05-26 PROCEDURE — 97140 MANUAL THERAPY 1/> REGIONS: CPT | Performed by: PHYSICAL THERAPIST

## 2020-05-26 PROCEDURE — 97110 THERAPEUTIC EXERCISES: CPT | Performed by: PHYSICAL THERAPIST

## 2020-05-26 PROCEDURE — 97140 MANUAL THERAPY 1/> REGIONS: CPT | Performed by: OCCUPATIONAL THERAPIST

## 2020-05-26 PROCEDURE — 97112 NEUROMUSCULAR REEDUCATION: CPT | Performed by: PHYSICAL THERAPIST

## 2020-05-28 ENCOUNTER — OFFICE VISIT (OUTPATIENT)
Dept: OCCUPATIONAL THERAPY | Facility: CLINIC | Age: 55
End: 2020-05-28
Payer: OTHER GOVERNMENT

## 2020-05-28 ENCOUNTER — OFFICE VISIT (OUTPATIENT)
Dept: PHYSICAL THERAPY | Facility: CLINIC | Age: 55
End: 2020-05-28
Payer: OTHER GOVERNMENT

## 2020-05-28 DIAGNOSIS — S96.291D OTHER SPECIFIED INJURY OF INTRINSIC MUSCLE AND TENDON AT ANKLE AND FOOT LEVEL, RIGHT FOOT, SUBSEQUENT ENCOUNTER: ICD-10-CM

## 2020-05-28 DIAGNOSIS — S93.401D SPRAIN OF LIGAMENT OF RIGHT ANKLE, SUBSEQUENT ENCOUNTER: Primary | ICD-10-CM

## 2020-05-28 DIAGNOSIS — S62.609B OPEN FRACTURE DISLOCATION OF DISTAL INTERPHALANGEAL (DIP) JOINT OF FINGER: Primary | ICD-10-CM

## 2020-05-28 PROCEDURE — 97112 NEUROMUSCULAR REEDUCATION: CPT | Performed by: PHYSICAL THERAPIST

## 2020-05-28 PROCEDURE — 97110 THERAPEUTIC EXERCISES: CPT | Performed by: PHYSICAL THERAPIST

## 2020-05-28 PROCEDURE — 97530 THERAPEUTIC ACTIVITIES: CPT | Performed by: OCCUPATIONAL THERAPIST

## 2020-05-28 PROCEDURE — 97140 MANUAL THERAPY 1/> REGIONS: CPT | Performed by: PHYSICAL THERAPIST

## 2020-05-28 PROCEDURE — 97140 MANUAL THERAPY 1/> REGIONS: CPT | Performed by: OCCUPATIONAL THERAPIST

## 2020-06-02 ENCOUNTER — OFFICE VISIT (OUTPATIENT)
Dept: OCCUPATIONAL THERAPY | Facility: CLINIC | Age: 55
End: 2020-06-02
Payer: OTHER GOVERNMENT

## 2020-06-02 ENCOUNTER — OFFICE VISIT (OUTPATIENT)
Dept: PHYSICAL THERAPY | Facility: CLINIC | Age: 55
End: 2020-06-02
Payer: OTHER GOVERNMENT

## 2020-06-02 DIAGNOSIS — S96.291D OTHER SPECIFIED INJURY OF INTRINSIC MUSCLE AND TENDON AT ANKLE AND FOOT LEVEL, RIGHT FOOT, SUBSEQUENT ENCOUNTER: ICD-10-CM

## 2020-06-02 DIAGNOSIS — S62.609B OPEN FRACTURE DISLOCATION OF DISTAL INTERPHALANGEAL (DIP) JOINT OF FINGER: Primary | ICD-10-CM

## 2020-06-02 DIAGNOSIS — S93.401D SPRAIN OF LIGAMENT OF RIGHT ANKLE, SUBSEQUENT ENCOUNTER: Primary | ICD-10-CM

## 2020-06-02 PROCEDURE — 97140 MANUAL THERAPY 1/> REGIONS: CPT | Performed by: OCCUPATIONAL THERAPIST

## 2020-06-02 PROCEDURE — 97112 NEUROMUSCULAR REEDUCATION: CPT

## 2020-06-02 PROCEDURE — 97530 THERAPEUTIC ACTIVITIES: CPT | Performed by: OCCUPATIONAL THERAPIST

## 2020-06-02 PROCEDURE — 97140 MANUAL THERAPY 1/> REGIONS: CPT

## 2020-06-04 ENCOUNTER — OFFICE VISIT (OUTPATIENT)
Dept: PHYSICAL THERAPY | Facility: CLINIC | Age: 55
End: 2020-06-04
Payer: OTHER GOVERNMENT

## 2020-06-04 ENCOUNTER — OFFICE VISIT (OUTPATIENT)
Dept: OCCUPATIONAL THERAPY | Facility: CLINIC | Age: 55
End: 2020-06-04
Payer: OTHER GOVERNMENT

## 2020-06-04 DIAGNOSIS — S62.609B OPEN FRACTURE DISLOCATION OF DISTAL INTERPHALANGEAL (DIP) JOINT OF FINGER: Primary | ICD-10-CM

## 2020-06-04 DIAGNOSIS — S93.401D SPRAIN OF LIGAMENT OF RIGHT ANKLE, SUBSEQUENT ENCOUNTER: Primary | ICD-10-CM

## 2020-06-04 DIAGNOSIS — S96.291D OTHER SPECIFIED INJURY OF INTRINSIC MUSCLE AND TENDON AT ANKLE AND FOOT LEVEL, RIGHT FOOT, SUBSEQUENT ENCOUNTER: ICD-10-CM

## 2020-06-04 PROCEDURE — 97530 THERAPEUTIC ACTIVITIES: CPT | Performed by: OCCUPATIONAL THERAPIST

## 2020-06-04 PROCEDURE — 97140 MANUAL THERAPY 1/> REGIONS: CPT | Performed by: PHYSICAL THERAPIST

## 2020-06-04 PROCEDURE — 97112 NEUROMUSCULAR REEDUCATION: CPT | Performed by: PHYSICAL THERAPIST

## 2020-06-04 PROCEDURE — 97140 MANUAL THERAPY 1/> REGIONS: CPT | Performed by: OCCUPATIONAL THERAPIST

## 2020-06-08 ENCOUNTER — OFFICE VISIT (OUTPATIENT)
Dept: PODIATRY | Facility: CLINIC | Age: 55
End: 2020-06-08
Payer: OTHER GOVERNMENT

## 2020-06-08 VITALS — WEIGHT: 140 LBS | HEIGHT: 71 IN | BODY MASS INDEX: 19.6 KG/M2

## 2020-06-08 DIAGNOSIS — S93.401A SPRAIN OF RIGHT ANKLE, UNSPECIFIED LIGAMENT, INITIAL ENCOUNTER: Primary | ICD-10-CM

## 2020-06-08 DIAGNOSIS — G57.51 TARSAL TUNNEL SYNDROME, RIGHT: ICD-10-CM

## 2020-06-08 DIAGNOSIS — S96.291A OTHER SPECIFIED INJURY OF INTRINSIC MUSCLE AND TENDON AT ANKLE AND FOOT LEVEL, RIGHT FOOT, INITIAL ENCOUNTER: ICD-10-CM

## 2020-06-08 DIAGNOSIS — M72.2 PLANTAR FASCIITIS: ICD-10-CM

## 2020-06-08 PROCEDURE — 1036F TOBACCO NON-USER: CPT | Performed by: PODIATRIST

## 2020-06-08 PROCEDURE — 3008F BODY MASS INDEX DOCD: CPT | Performed by: PODIATRIST

## 2020-06-08 PROCEDURE — 99213 OFFICE O/P EST LOW 20 MIN: CPT | Performed by: PODIATRIST

## 2020-06-09 ENCOUNTER — OFFICE VISIT (OUTPATIENT)
Dept: OCCUPATIONAL THERAPY | Facility: CLINIC | Age: 55
End: 2020-06-09
Payer: OTHER GOVERNMENT

## 2020-06-09 ENCOUNTER — OFFICE VISIT (OUTPATIENT)
Dept: PHYSICAL THERAPY | Facility: CLINIC | Age: 55
End: 2020-06-09
Payer: OTHER GOVERNMENT

## 2020-06-09 DIAGNOSIS — S96.291D OTHER SPECIFIED INJURY OF INTRINSIC MUSCLE AND TENDON AT ANKLE AND FOOT LEVEL, RIGHT FOOT, SUBSEQUENT ENCOUNTER: ICD-10-CM

## 2020-06-09 DIAGNOSIS — S62.609B OPEN FRACTURE DISLOCATION OF DISTAL INTERPHALANGEAL (DIP) JOINT OF FINGER: Primary | ICD-10-CM

## 2020-06-09 DIAGNOSIS — S93.401D SPRAIN OF LIGAMENT OF RIGHT ANKLE, SUBSEQUENT ENCOUNTER: Primary | ICD-10-CM

## 2020-06-09 PROCEDURE — 97110 THERAPEUTIC EXERCISES: CPT

## 2020-06-09 PROCEDURE — 97140 MANUAL THERAPY 1/> REGIONS: CPT | Performed by: OCCUPATIONAL THERAPIST

## 2020-06-09 PROCEDURE — 97140 MANUAL THERAPY 1/> REGIONS: CPT

## 2020-06-09 PROCEDURE — 97110 THERAPEUTIC EXERCISES: CPT | Performed by: OCCUPATIONAL THERAPIST

## 2020-06-11 ENCOUNTER — OFFICE VISIT (OUTPATIENT)
Dept: OCCUPATIONAL THERAPY | Facility: CLINIC | Age: 55
End: 2020-06-11
Payer: OTHER GOVERNMENT

## 2020-06-11 ENCOUNTER — OFFICE VISIT (OUTPATIENT)
Dept: PHYSICAL THERAPY | Facility: CLINIC | Age: 55
End: 2020-06-11
Payer: OTHER GOVERNMENT

## 2020-06-11 DIAGNOSIS — S62.609B OPEN FRACTURE DISLOCATION OF DISTAL INTERPHALANGEAL (DIP) JOINT OF FINGER: Primary | ICD-10-CM

## 2020-06-11 DIAGNOSIS — S93.401D SPRAIN OF LIGAMENT OF RIGHT ANKLE, SUBSEQUENT ENCOUNTER: Primary | ICD-10-CM

## 2020-06-11 DIAGNOSIS — S96.291D OTHER SPECIFIED INJURY OF INTRINSIC MUSCLE AND TENDON AT ANKLE AND FOOT LEVEL, RIGHT FOOT, SUBSEQUENT ENCOUNTER: ICD-10-CM

## 2020-06-11 PROCEDURE — 97140 MANUAL THERAPY 1/> REGIONS: CPT

## 2020-06-11 PROCEDURE — 97110 THERAPEUTIC EXERCISES: CPT | Performed by: OCCUPATIONAL THERAPIST

## 2020-06-11 PROCEDURE — 97140 MANUAL THERAPY 1/> REGIONS: CPT | Performed by: OCCUPATIONAL THERAPIST

## 2020-06-11 PROCEDURE — 97112 NEUROMUSCULAR REEDUCATION: CPT

## 2020-06-11 PROCEDURE — 97110 THERAPEUTIC EXERCISES: CPT

## 2020-06-12 ENCOUNTER — HOSPITAL ENCOUNTER (OUTPATIENT)
Dept: NON INVASIVE DIAGNOSTICS | Age: 55
Discharge: HOME | End: 2020-06-12
Payer: OTHER GOVERNMENT

## 2020-06-12 DIAGNOSIS — R00.1 BRADYCARDIA: ICD-10-CM

## 2020-06-12 PROCEDURE — 93306 TTE W/DOPPLER COMPLETE: CPT | Performed by: INTERNAL MEDICINE

## 2020-06-12 PROCEDURE — 93306 TTE W/DOPPLER COMPLETE: CPT

## 2020-06-15 ENCOUNTER — OFFICE VISIT (OUTPATIENT)
Dept: OBGYN CLINIC | Facility: CLINIC | Age: 55
End: 2020-06-15
Payer: OTHER GOVERNMENT

## 2020-06-15 VITALS
WEIGHT: 140 LBS | SYSTOLIC BLOOD PRESSURE: 110 MMHG | BODY MASS INDEX: 19.6 KG/M2 | TEMPERATURE: 99.2 F | DIASTOLIC BLOOD PRESSURE: 70 MMHG | HEIGHT: 71 IN

## 2020-06-15 DIAGNOSIS — M79.644 FINGER PAIN, RIGHT: ICD-10-CM

## 2020-06-15 DIAGNOSIS — S62.609B OPEN FRACTURE DISLOCATION OF DISTAL INTERPHALANGEAL (DIP) JOINT OF FINGER: Primary | ICD-10-CM

## 2020-06-15 PROCEDURE — 1036F TOBACCO NON-USER: CPT | Performed by: ORTHOPAEDIC SURGERY

## 2020-06-15 PROCEDURE — 99213 OFFICE O/P EST LOW 20 MIN: CPT | Performed by: ORTHOPAEDIC SURGERY

## 2020-06-15 PROCEDURE — 3008F BODY MASS INDEX DOCD: CPT | Performed by: ORTHOPAEDIC SURGERY

## 2020-06-16 ENCOUNTER — OFFICE VISIT (OUTPATIENT)
Dept: OCCUPATIONAL THERAPY | Facility: CLINIC | Age: 55
End: 2020-06-16
Payer: OTHER GOVERNMENT

## 2020-06-16 ENCOUNTER — OFFICE VISIT (OUTPATIENT)
Dept: PHYSICAL THERAPY | Facility: CLINIC | Age: 55
End: 2020-06-16
Payer: OTHER GOVERNMENT

## 2020-06-16 DIAGNOSIS — S96.291D OTHER SPECIFIED INJURY OF INTRINSIC MUSCLE AND TENDON AT ANKLE AND FOOT LEVEL, RIGHT FOOT, SUBSEQUENT ENCOUNTER: ICD-10-CM

## 2020-06-16 DIAGNOSIS — S93.401D SPRAIN OF LIGAMENT OF RIGHT ANKLE, SUBSEQUENT ENCOUNTER: Primary | ICD-10-CM

## 2020-06-16 DIAGNOSIS — S62.609B OPEN FRACTURE DISLOCATION OF DISTAL INTERPHALANGEAL (DIP) JOINT OF FINGER: Primary | ICD-10-CM

## 2020-06-16 PROCEDURE — 97140 MANUAL THERAPY 1/> REGIONS: CPT

## 2020-06-16 PROCEDURE — 97140 MANUAL THERAPY 1/> REGIONS: CPT | Performed by: OCCUPATIONAL THERAPIST

## 2020-06-16 PROCEDURE — 97110 THERAPEUTIC EXERCISES: CPT

## 2020-06-16 PROCEDURE — 97110 THERAPEUTIC EXERCISES: CPT | Performed by: OCCUPATIONAL THERAPIST

## 2020-06-18 ENCOUNTER — OFFICE VISIT (OUTPATIENT)
Dept: PHYSICAL THERAPY | Facility: CLINIC | Age: 55
End: 2020-06-18
Payer: OTHER GOVERNMENT

## 2020-06-18 ENCOUNTER — OFFICE VISIT (OUTPATIENT)
Dept: OCCUPATIONAL THERAPY | Facility: CLINIC | Age: 55
End: 2020-06-18
Payer: OTHER GOVERNMENT

## 2020-06-18 DIAGNOSIS — S62.609B OPEN FRACTURE DISLOCATION OF DISTAL INTERPHALANGEAL (DIP) JOINT OF FINGER: Primary | ICD-10-CM

## 2020-06-18 DIAGNOSIS — S93.401D SPRAIN OF LIGAMENT OF RIGHT ANKLE, SUBSEQUENT ENCOUNTER: Primary | ICD-10-CM

## 2020-06-18 DIAGNOSIS — S96.291D OTHER SPECIFIED INJURY OF INTRINSIC MUSCLE AND TENDON AT ANKLE AND FOOT LEVEL, RIGHT FOOT, SUBSEQUENT ENCOUNTER: ICD-10-CM

## 2020-06-18 PROCEDURE — 97140 MANUAL THERAPY 1/> REGIONS: CPT | Performed by: OCCUPATIONAL THERAPIST

## 2020-06-18 PROCEDURE — 97110 THERAPEUTIC EXERCISES: CPT

## 2020-06-18 PROCEDURE — 97110 THERAPEUTIC EXERCISES: CPT | Performed by: OCCUPATIONAL THERAPIST

## 2020-06-18 PROCEDURE — 97140 MANUAL THERAPY 1/> REGIONS: CPT

## 2020-06-22 ENCOUNTER — TELEPHONE (OUTPATIENT)
Dept: PODIATRY | Facility: CLINIC | Age: 55
End: 2020-06-22

## 2020-06-23 ENCOUNTER — OFFICE VISIT (OUTPATIENT)
Dept: PHYSICAL THERAPY | Facility: CLINIC | Age: 55
End: 2020-06-23
Payer: OTHER GOVERNMENT

## 2020-06-23 ENCOUNTER — TELEPHONE (OUTPATIENT)
Dept: FAMILY MEDICINE CLINIC | Facility: CLINIC | Age: 55
End: 2020-06-23

## 2020-06-23 DIAGNOSIS — S96.291D OTHER SPECIFIED INJURY OF INTRINSIC MUSCLE AND TENDON AT ANKLE AND FOOT LEVEL, RIGHT FOOT, SUBSEQUENT ENCOUNTER: ICD-10-CM

## 2020-06-23 DIAGNOSIS — S93.401D SPRAIN OF LIGAMENT OF RIGHT ANKLE, SUBSEQUENT ENCOUNTER: Primary | ICD-10-CM

## 2020-06-23 PROCEDURE — 97112 NEUROMUSCULAR REEDUCATION: CPT

## 2020-06-23 PROCEDURE — 97110 THERAPEUTIC EXERCISES: CPT

## 2020-06-23 PROCEDURE — 97140 MANUAL THERAPY 1/> REGIONS: CPT

## 2020-06-25 ENCOUNTER — EVALUATION (OUTPATIENT)
Dept: PHYSICAL THERAPY | Facility: CLINIC | Age: 55
End: 2020-06-25
Payer: OTHER GOVERNMENT

## 2020-06-25 DIAGNOSIS — S93.401D SPRAIN OF LIGAMENT OF RIGHT ANKLE, SUBSEQUENT ENCOUNTER: Primary | ICD-10-CM

## 2020-06-25 DIAGNOSIS — S96.291D OTHER SPECIFIED INJURY OF INTRINSIC MUSCLE AND TENDON AT ANKLE AND FOOT LEVEL, RIGHT FOOT, SUBSEQUENT ENCOUNTER: ICD-10-CM

## 2020-06-25 PROCEDURE — 97140 MANUAL THERAPY 1/> REGIONS: CPT | Performed by: PHYSICAL THERAPIST

## 2020-06-25 PROCEDURE — 97110 THERAPEUTIC EXERCISES: CPT | Performed by: PHYSICAL THERAPIST

## 2020-06-25 PROCEDURE — 97112 NEUROMUSCULAR REEDUCATION: CPT | Performed by: PHYSICAL THERAPIST

## 2020-06-30 ENCOUNTER — OFFICE VISIT (OUTPATIENT)
Dept: PHYSICAL THERAPY | Facility: CLINIC | Age: 55
End: 2020-06-30
Payer: OTHER GOVERNMENT

## 2020-06-30 DIAGNOSIS — S96.291D OTHER SPECIFIED INJURY OF INTRINSIC MUSCLE AND TENDON AT ANKLE AND FOOT LEVEL, RIGHT FOOT, SUBSEQUENT ENCOUNTER: ICD-10-CM

## 2020-06-30 DIAGNOSIS — S93.401D SPRAIN OF LIGAMENT OF RIGHT ANKLE, SUBSEQUENT ENCOUNTER: Primary | ICD-10-CM

## 2020-06-30 PROCEDURE — 97110 THERAPEUTIC EXERCISES: CPT

## 2020-06-30 PROCEDURE — 97112 NEUROMUSCULAR REEDUCATION: CPT

## 2020-06-30 PROCEDURE — 97140 MANUAL THERAPY 1/> REGIONS: CPT

## 2020-07-02 ENCOUNTER — OFFICE VISIT (OUTPATIENT)
Dept: PHYSICAL THERAPY | Facility: CLINIC | Age: 55
End: 2020-07-02
Payer: OTHER GOVERNMENT

## 2020-07-02 DIAGNOSIS — S96.291D OTHER SPECIFIED INJURY OF INTRINSIC MUSCLE AND TENDON AT ANKLE AND FOOT LEVEL, RIGHT FOOT, SUBSEQUENT ENCOUNTER: ICD-10-CM

## 2020-07-02 DIAGNOSIS — S93.401D SPRAIN OF LIGAMENT OF RIGHT ANKLE, SUBSEQUENT ENCOUNTER: Primary | ICD-10-CM

## 2020-07-02 PROCEDURE — 97110 THERAPEUTIC EXERCISES: CPT

## 2020-07-02 NOTE — PROGRESS NOTES
Daily Note     Today's date: 2020  Patient name: Nikki Wang  : 1965  MRN: [id number]  Referring provider: Brooke Kline DPM  Dx:   Encounter Diagnosis     ICD-10-CM    1  Sprain of ligament of right ankle, subsequent encounter S93 401D    2  Other specified injury of intrinsic muscle and tendon at ankle and foot level, right foot, subsequent encounter S96 291D                   Subjective: Pt states still having burning underneath her foot, and it feels like her foot is rubbing against her shoe, believes her foot is swollen  Objective: See treatment diary below      Assessment: Pt tolerated below TE  With fair/poor tolerance  No significant change after manual/stm to painful region         Plan: Continue plan of care     POC EXPIRES On:  20  PRECAUTIONS:  None  CO-MORBIDITES:  Anemia  PERSONAL FACTORS:  None                    Manuals HEP    STM R proximal plantar fascia   10' 10' 10' 10'  10' 10' 10'                                                         Neuro Re-Ed            Toe flexion R 5/15 2" at a time for 2' total 2" at a time for 2' 2" at a time for 2' 2" at a time for 2'  2" at a time for 2' 2" at a time for 2' 2" at a time   BAPS R seated CW/CCW   L3 20 ea L3 20 L3 20 L3 20  L3 20 L3 20 L3 20                     SLS R on foam   Blk  10"x10 Black 10" 10 Black 10" 10 Black 10" 10  Black 10" 10 Black 10" 10 Black 10" 10   T-band kicks with L for R LE balance - hip flex, abd, ext   Blue 20 Blue 20 Blue 20  Blue 20  Blue 20 ea Blue 20 ea Blue 20 ea   Tandem walk on foam 12' FWD/BWD with fingertip cone touch   4 ea 4ea +hurdles  3ea +hurdles 3ea  + hurdles 3 ea +hurdles 3ea +hurdles 3ea   SLS R with cone touch to L+R       5 ea 5ea 5ea   Tandem walk  on foam with 5 hurdles to step over FWD + SIDE 12'   2 ea 2ea  D/c         SIDE on foam + 3cone touch infront w/ foot       3ea 3ea  3 ea 3 ea 3 ea   Ther Ex           S/L R ankle eversion 5/15 3# 20 ea 3# 21 ea 3# 20 ea 2# 20 ea  2# 20 ea 2# 20 ea 2# 20 ea   Standing bent knee R calf stretch 5/15 wedge 20" 5 wedge20" 5 wedge 20" 5 wedge 20" 5  Wedge 20" 5 Wedge 20" 5 Wedge 20" 5                     Ankle HR/TR   20 20 20 20  20 20 20                     Bike   L1 6' L1 6' L1 6' L1 6'  L1 6' L1 6' L1 5'                                       Ther Activity                                               Gait Training                                               Modalities

## 2020-07-06 ENCOUNTER — TELEPHONE (OUTPATIENT)
Dept: FAMILY MEDICINE CLINIC | Facility: CLINIC | Age: 55
End: 2020-07-06

## 2020-07-06 ENCOUNTER — OFFICE VISIT (OUTPATIENT)
Dept: PODIATRY | Facility: CLINIC | Age: 55
End: 2020-07-06
Payer: OTHER GOVERNMENT

## 2020-07-06 VITALS — WEIGHT: 140 LBS | BODY MASS INDEX: 19.6 KG/M2 | HEIGHT: 71 IN

## 2020-07-06 DIAGNOSIS — M72.2 PLANTAR FASCIITIS: ICD-10-CM

## 2020-07-06 DIAGNOSIS — S96.291A OTHER SPECIFIED INJURY OF INTRINSIC MUSCLE AND TENDON AT ANKLE AND FOOT LEVEL, RIGHT FOOT, INITIAL ENCOUNTER: ICD-10-CM

## 2020-07-06 DIAGNOSIS — S93.401A SPRAIN OF RIGHT ANKLE, UNSPECIFIED LIGAMENT, INITIAL ENCOUNTER: Primary | ICD-10-CM

## 2020-07-06 PROCEDURE — 3008F BODY MASS INDEX DOCD: CPT | Performed by: PODIATRIST

## 2020-07-06 PROCEDURE — 1036F TOBACCO NON-USER: CPT | Performed by: PODIATRIST

## 2020-07-06 PROCEDURE — 99213 OFFICE O/P EST LOW 20 MIN: CPT | Performed by: PODIATRIST

## 2020-07-06 NOTE — PROGRESS NOTES
PATIENT:  Barbra Led  1965       ASSESSMENT:     1  Sprain of right ankle, unspecified ligament, initial encounter  Ambulatory referral to Physical Therapy   2  Other specified injury of intrinsic muscle and tendon at ankle and foot level, right foot, initial encounter  Ambulatory referral to Physical Therapy   3  Plantar fasciitis  Ambulatory referral to Physical Therapy             PLAN:  Patient was counseled and educated on the condition and the diagnosis  The diagnosis, treatment options and prognosis were discussed with the patient  Continue to treat her with PT/OT  CAM walker as needed  Instructed to use NSAIDs, icing, and home exercise  She is not amenable to any injection at this time  Continue supportive care and PT  She will call if her condition gets worse or she wants to try an injection  Subjective:     HPI  The patient presents for evaluation of right ankle/ foot evaluation  She has some pain on right heel and ankle  Pain has been better, but is still 5 out of 10  No numbness or weakness  No edema  The following portions of the patient's history were reviewed and updated as appropriate: allergies, current medications, past family history, past medical history, past social history, past surgical history and problem list   All pertinent labs and images were reviewed        Past Medical History  Past Medical History:   Diagnosis Date    Abnormal Pap smear of cervix     Anemia     Atypical nevus of scalp     last assessed 5/11/17     Basal cell carcinoma of scalp     last assessed 5/11/17     Breast lump     last assessed 5/12/14     Cancer (HCC)     BCC-left nose    Cervical polyp     Fibroid     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    Leukopenia     Menorrhagia     last assessed 5/12/14     Metrorrhagia     last assessed 10/22/15     Ovarian cyst     left    Urinary tract infection        Past Surgical History  Past Surgical History: Procedure Laterality Date    BREAST BIOPSY Right 2011    percutan needle core use imag guide stereotactic / benign    BREAST SURGERY Right     bx    COLONOSCOPY      HI EXC SKIN BENIG >4 CM REMAINDR BODY N/A 1/30/2017    Procedure: SCALP SUSPICIOUS LESION EXCISION;  Surgeon: Citlalli Davey MD;  Location: [facility];  Service: Plastics    HI RECMPL WND SCALP,EXTR 2 6-7 5 CM N/A 1/30/2017    Procedure: COMPLEX CLOSURE;  Surgeon: Citlalli Davey MD;  Location: [facility];  Service: Plastics    SKIN BIOPSY      basal cell ca    SKIN CANCER EXCISION      nose        Allergies:  Patient has no known allergies  Medications:  Current Outpatient Medications   Medication Sig Dispense Refill    IRON PO Take by mouth      Multiple Vitamin (MULTIVITAMIN) tablet Take 1 tablet by mouth daily      Elastic Bandages & Supports (AIRCAST AIRSPORT ANKLE BRACE) MISC by Does not apply route daily Right ankle (Patient not taking: Reported on 6/15/2020) 1 each 0    Elastic Bandages & Supports (AIRCAST AIRSPORT ANKLE BRACE) MISC by Does not apply route daily For right ankle (Patient not taking: Reported on 6/15/2020) 1 each 0    meloxicam (MOBIC) 15 mg tablet Take 1 tablet (15 mg total) by mouth daily after meal (Patient not taking: Reported on 4/27/2020) 30 tablet 0     No current facility-administered medications for this visit          Social History:  Social History     Socioeconomic History    Marital status: /Civil Union     Spouse name: None    Number of children: None    Years of education: None    Highest education level: None   Occupational History    Occupation:     Social Needs    Financial resource strain: None    Food insecurity:     Worry: None     Inability: None    Transportation needs:     Medical: None     Non-medical: None   Tobacco Use    Smoking status: Never Smoker    Smokeless tobacco: Never Used   Substance and Sexual Activity    Alcohol use: No     Comment: social drinker per allscript     Drug use: No    Sexual activity: Yes     Birth control/protection: Spermicide   Lifestyle    Physical activity:     Days per week: None     Minutes per session: None    Stress: None   Relationships    Social connections:     Talks on phone: None     Gets together: None     Attends Yarsanism service: None     Active member of club or organization: None     Attends meetings of clubs or organizations: None     Relationship status: None    Intimate partner violence:     Fear of current or ex partner: None     Emotionally abused: None     Physically abused: None     Forced sexual activity: None   Other Topics Concern    None   Social History Narrative    Caffeine use     Mandaeism affiliation Voodoo Hindu disciples of mohit     Uses safety equipment seatbelts           Review of Systems   Constitutional: Negative for chills and fever  HENT: Negative for sore throat  Respiratory: Negative for cough and shortness of breath  Cardiovascular: Negative for chest pain  Gastrointestinal: Negative for diarrhea, nausea and vomiting  Neurological: Negative for weakness and numbness  Objective:      Ht 5' 10 5" (1 791 m)   Wt 63 5 kg (140 lb)   BMI 19 80 kg/m²          Physical Exam   Constitutional: She is oriented to person, place, and time  She appears well-developed and well-nourished  No distress  Cardiovascular: Normal rate, regular rhythm and intact distal pulses  Pulmonary/Chest: Effort normal and breath sounds normal  No respiratory distress  Musculoskeletal: Normal range of motion  She exhibits no deformity  Minimal edema right lateral ankle  Right ankle ROM is WNL without significant pain  Minimal pain around right ATFL  Minimal pain presents around peroneal tendon right ankle  Mild tenderness plantar right heel without edema  MMT is WNL  Anterior drawer test is negative right ankle  Positive Tinel sign around right zahraa pedis    Tenderness noted right sinus tarsi without edema  Neurological: She is alert and oriented to person, place, and time  She displays normal reflexes  No cranial nerve deficit or sensory deficit  Skin: Skin is intact  Capillary refill takes less than 2 seconds  No ecchymosis, no petechiae and no rash noted  No erythema  Vitals reviewed

## 2020-07-07 ENCOUNTER — APPOINTMENT (OUTPATIENT)
Dept: PHYSICAL THERAPY | Facility: CLINIC | Age: 55
End: 2020-07-07
Payer: OTHER GOVERNMENT

## 2020-07-09 ENCOUNTER — APPOINTMENT (OUTPATIENT)
Dept: PHYSICAL THERAPY | Facility: CLINIC | Age: 55
End: 2020-07-09
Payer: OTHER GOVERNMENT

## 2020-07-15 ENCOUNTER — EVALUATION (OUTPATIENT)
Dept: PHYSICAL THERAPY | Facility: CLINIC | Age: 55
End: 2020-07-15
Payer: OTHER GOVERNMENT

## 2020-07-15 DIAGNOSIS — S93.401D SPRAIN OF RIGHT ANKLE, UNSPECIFIED LIGAMENT, SUBSEQUENT ENCOUNTER: ICD-10-CM

## 2020-07-15 DIAGNOSIS — S96.291A OTHER SPECIFIED INJURY OF INTRINSIC MUSCLE AND TENDON AT ANKLE AND FOOT LEVEL, RIGHT FOOT, INITIAL ENCOUNTER: ICD-10-CM

## 2020-07-15 DIAGNOSIS — M72.2 PLANTAR FASCIITIS: ICD-10-CM

## 2020-07-15 PROCEDURE — 97140 MANUAL THERAPY 1/> REGIONS: CPT | Performed by: PHYSICAL THERAPIST

## 2020-07-15 PROCEDURE — 97110 THERAPEUTIC EXERCISES: CPT | Performed by: PHYSICAL THERAPIST

## 2020-07-15 PROCEDURE — 97112 NEUROMUSCULAR REEDUCATION: CPT | Performed by: PHYSICAL THERAPIST

## 2020-07-15 NOTE — PROGRESS NOTES
Daily Note     Today's date: 7/15/2020  Patient name: Patricia Baptiste  : 1965  MRN: [id number]  Referring provider: Gracia Garcia DPM  Dx:   Encounter Diagnosis     ICD-10-CM    1  Sprain of right ankle, unspecified ligament, subsequent encounter S93 401D Ambulatory referral to Physical Therapy   2  Other specified injury of intrinsic muscle and tendon at ankle and foot level, right foot, initial encounter S96 291A Ambulatory referral to Physical Therapy   3  Plantar fasciitis M72 2 Ambulatory referral to Physical Therapy                  Subjective: Pt notes that she wanted to switch to the [facility] after finishing all her hand therapy at , and she felt as if her progress had plateaued  Objective: See treatment diary below  Hypomobility of right TC joint, 0 degrees DF, + right slump that reproduces pt's cc of burning in right dorso medial foot  Also able to take buringing from a 4/10 to a 1/10 with sciatic sliders  + tenderness to the right medial gastroc      Assessment: Pt presents with increased fear avoidance, as well as hypomobility of the right ankle, neural tension, and reduced DF  Plan: Continue per plan of care  Progress treatment as tolerated         POC EXPIRES On:  20  PRECAUTIONS:  None  CO-MORBIDITES:  Anemia  PERSONAL FACTORS:  None          7/15          Manuals HEP           Right gastroc stm   DB           right sciatic sliders  DB              right TC AP mobs   DB                               Neuro Re-Ed                                                      SLS R on foam   nv          Sciatic sliders supine and seated  2x10 ea                                                          Ther Ex           Foam roller right gastroc  nv          Soleus lunge  15''x2           step stretch   15''x2             Ankle HR/TR   nv           banded tc mobs   nv          Bike   nv                                              Ther Activity                                         Gait Training                                               Modalities

## 2020-07-21 ENCOUNTER — OFFICE VISIT (OUTPATIENT)
Dept: PHYSICAL THERAPY | Facility: CLINIC | Age: 55
End: 2020-07-21
Payer: OTHER GOVERNMENT

## 2020-07-21 DIAGNOSIS — S93.401D SPRAIN OF RIGHT ANKLE, UNSPECIFIED LIGAMENT, SUBSEQUENT ENCOUNTER: Primary | ICD-10-CM

## 2020-07-21 DIAGNOSIS — S96.291A OTHER SPECIFIED INJURY OF INTRINSIC MUSCLE AND TENDON AT ANKLE AND FOOT LEVEL, RIGHT FOOT, INITIAL ENCOUNTER: ICD-10-CM

## 2020-07-21 DIAGNOSIS — S93.401D SPRAIN OF LIGAMENT OF RIGHT ANKLE, SUBSEQUENT ENCOUNTER: ICD-10-CM

## 2020-07-21 DIAGNOSIS — M72.2 PLANTAR FASCIITIS: ICD-10-CM

## 2020-07-21 DIAGNOSIS — S96.291D OTHER SPECIFIED INJURY OF INTRINSIC MUSCLE AND TENDON AT ANKLE AND FOOT LEVEL, RIGHT FOOT, SUBSEQUENT ENCOUNTER: ICD-10-CM

## 2020-07-21 PROCEDURE — 97112 NEUROMUSCULAR REEDUCATION: CPT | Performed by: PHYSICAL THERAPIST

## 2020-07-21 PROCEDURE — 97140 MANUAL THERAPY 1/> REGIONS: CPT | Performed by: PHYSICAL THERAPIST

## 2020-07-21 PROCEDURE — 97110 THERAPEUTIC EXERCISES: CPT | Performed by: PHYSICAL THERAPIST

## 2020-07-21 NOTE — PROGRESS NOTES
Daily Note     Today's date: 2020  Patient name: Nikia Almanza  : 1965  MRN: [id number]  Referring provider: Patricia Hyman DPM  Dx:   Encounter Diagnosis     ICD-10-CM    1  Sprain of right ankle, unspecified ligament, subsequent encounter S93 401D    2  Other specified injury of intrinsic muscle and tendon at ankle and foot level, right foot, initial encounter S96 291A    3  Plantar fasciitis M72 2    4  Sprain of ligament of right ankle, subsequent encounter S93 401D    5  Other specified injury of intrinsic muscle and tendon at ankle and foot level, right foot, subsequent encounter S96 291D                   Subjective: pt notes a decrease in heel pain on a daily basis since starting PT in [facility]      Objective: See treatment diary below      Assessment: pt with reduced ttp on the right gastroc but continued reduced mobility of the right TC joint limiting DF    Plan: Continue per plan of care        POC EXPIRES On:  20  PRECAUTIONS:  None  CO-MORBIDITES:  Anemia  PERSONAL FACTORS:  None          7/15 7/21         Manuals HEP           Right gastroc stm   DB DB          right sciatic sliders  DB  DB            right TC AP mobs   DB  DB                             Neuro Re-Ed                                                      SLS R on foam   nv 15''x5         Sciatic sliders supine and seated  2x10 ea 2x10+ankle circles x10 ea                                                         Ther Ex           Foam roller right gastroc  nv hold         Soleus lunge  15''x2 15''x3          step stretch   15''x2  15''x3           Ankle HR/TR   nv 2x10 ea          banded tc mobs   nv          Bike   nv lv 1 6'          ankle 4 way     Green 2x10 ea                             Ther Activity                                               Gait Training                                               Modalities

## 2020-07-29 ENCOUNTER — OFFICE VISIT (OUTPATIENT)
Dept: PHYSICAL THERAPY | Facility: CLINIC | Age: 55
End: 2020-07-29
Payer: OTHER GOVERNMENT

## 2020-07-29 DIAGNOSIS — S96.291D OTHER SPECIFIED INJURY OF INTRINSIC MUSCLE AND TENDON AT ANKLE AND FOOT LEVEL, RIGHT FOOT, SUBSEQUENT ENCOUNTER: ICD-10-CM

## 2020-07-29 DIAGNOSIS — S96.291A OTHER SPECIFIED INJURY OF INTRINSIC MUSCLE AND TENDON AT ANKLE AND FOOT LEVEL, RIGHT FOOT, INITIAL ENCOUNTER: ICD-10-CM

## 2020-07-29 DIAGNOSIS — M72.2 PLANTAR FASCIITIS: ICD-10-CM

## 2020-07-29 DIAGNOSIS — S93.401D SPRAIN OF RIGHT ANKLE, UNSPECIFIED LIGAMENT, SUBSEQUENT ENCOUNTER: Primary | ICD-10-CM

## 2020-07-29 DIAGNOSIS — S93.401D SPRAIN OF LIGAMENT OF RIGHT ANKLE, SUBSEQUENT ENCOUNTER: ICD-10-CM

## 2020-07-29 PROCEDURE — 97112 NEUROMUSCULAR REEDUCATION: CPT | Performed by: PHYSICAL THERAPIST

## 2020-07-29 PROCEDURE — 97110 THERAPEUTIC EXERCISES: CPT | Performed by: PHYSICAL THERAPIST

## 2020-07-29 NOTE — PROGRESS NOTES
Daily Note     Today's date: 2020  Patient name: Sanjana Garcia  : 1965  MRN: [id number]  Referring provider: Marilyn Gallagher DPM  Dx:   Encounter Diagnosis     ICD-10-CM    1  Sprain of right ankle, unspecified ligament, subsequent encounter S93 401D    2  Other specified injury of intrinsic muscle and tendon at ankle and foot level, right foot, initial encounter S96 291A    3  Plantar fasciitis M72 2    4  Sprain of ligament of right ankle, subsequent encounter S93 401D    5  Other specified injury of intrinsic muscle and tendon at ankle and foot level, right foot, subsequent encounter S96 291D                   Subjective: pt notes that heel pain is down from a 5/10 to a 3/10  Still having the burning but less, also notes that she has been having some pain with the slider exercise at times  Objective: See treatment diary below      Assessment:pt continues to show progress with activities, advised pt not to push into pain with sliders as they should reduce pain and burning, also added ankle 4 way and tennis ball massage to Hep for strengthening as well as desensitization  Plan: Continue per plan of care        POC EXPIRES On:  20  PRECAUTIONS:  None  CO-MORBIDITES:  Anemia  PERSONAL FACTORS:  None          7/15 7/21         Manuals HEP           Right gastroc stm   DB DB          right sciatic sliders  DB  DB            right TC AP mobs   DB  DB                             Neuro Re-Ed                                                      SLS R on foam   nv 15''x5 15''x5        Sciatic sliders supine and seated  2x10 ea 2x10+ankle circles x10 ea Sciatic sliders/tensioners 3x10 ea                                                        Ther Ex           Foam roller right gastroc  nv hold         Soleus lunge  15''x2 15''x3          step stretch   15''x2  15''x3           Ankle HR/TR   nv 2x10 ea          banded tc mobs   nv          Bike   nv lv 1 6' lv 1 8'         ankle 4 way     Green 2x10 ea  green 2x10          tennis ball massage       2'         Ther Activity                                               Gait Training                                               Modalities

## 2020-08-03 ENCOUNTER — OFFICE VISIT (OUTPATIENT)
Dept: FAMILY MEDICINE CLINIC | Facility: CLINIC | Age: 55
End: 2020-08-03
Payer: OTHER GOVERNMENT

## 2020-08-03 ENCOUNTER — OFFICE VISIT (OUTPATIENT)
Dept: PHYSICAL THERAPY | Facility: CLINIC | Age: 55
End: 2020-08-03
Payer: OTHER GOVERNMENT

## 2020-08-03 VITALS
HEART RATE: 68 BPM | OXYGEN SATURATION: 99 % | TEMPERATURE: 97.9 F | DIASTOLIC BLOOD PRESSURE: 70 MMHG | HEIGHT: 71 IN | BODY MASS INDEX: 18.9 KG/M2 | SYSTOLIC BLOOD PRESSURE: 100 MMHG | WEIGHT: 135 LBS

## 2020-08-03 DIAGNOSIS — M79.672 PAIN IN BOTH FEET: ICD-10-CM

## 2020-08-03 DIAGNOSIS — S96.291A OTHER SPECIFIED INJURY OF INTRINSIC MUSCLE AND TENDON AT ANKLE AND FOOT LEVEL, RIGHT FOOT, INITIAL ENCOUNTER: ICD-10-CM

## 2020-08-03 DIAGNOSIS — M72.2 PLANTAR FASCIITIS: ICD-10-CM

## 2020-08-03 DIAGNOSIS — S93.401D SPRAIN OF RIGHT ANKLE, UNSPECIFIED LIGAMENT, SUBSEQUENT ENCOUNTER: Primary | ICD-10-CM

## 2020-08-03 DIAGNOSIS — G62.9 NEUROPATHY: ICD-10-CM

## 2020-08-03 DIAGNOSIS — Z87.440 HISTORY OF UTI: Primary | ICD-10-CM

## 2020-08-03 DIAGNOSIS — R30.0 DYSURIA: ICD-10-CM

## 2020-08-03 DIAGNOSIS — M79.671 PAIN IN BOTH FEET: ICD-10-CM

## 2020-08-03 LAB
SL AMB  POCT GLUCOSE, UA: NEGATIVE
SL AMB LEUKOCYTE ESTERASE,UA: ABNORMAL
SL AMB POCT BILIRUBIN,UA: NEGATIVE
SL AMB POCT BLOOD,UA: NEGATIVE
SL AMB POCT CLARITY,UA: ABNORMAL
SL AMB POCT COLOR,UA: ABNORMAL
SL AMB POCT KETONES,UA: NEGATIVE
SL AMB POCT NITRITE,UA: NEGATIVE
SL AMB POCT PH,UA: 6
SL AMB POCT SPECIFIC GRAVITY,UA: 1.01
SL AMB POCT URINE PROTEIN: ABNORMAL
SL AMB POCT UROBILINOGEN: 0.2

## 2020-08-03 PROCEDURE — 3008F BODY MASS INDEX DOCD: CPT | Performed by: FAMILY MEDICINE

## 2020-08-03 PROCEDURE — 97112 NEUROMUSCULAR REEDUCATION: CPT

## 2020-08-03 PROCEDURE — 1036F TOBACCO NON-USER: CPT | Performed by: FAMILY MEDICINE

## 2020-08-03 PROCEDURE — 87086 URINE CULTURE/COLONY COUNT: CPT | Performed by: FAMILY MEDICINE

## 2020-08-03 PROCEDURE — 81002 URINALYSIS NONAUTO W/O SCOPE: CPT | Performed by: FAMILY MEDICINE

## 2020-08-03 PROCEDURE — 97140 MANUAL THERAPY 1/> REGIONS: CPT

## 2020-08-03 PROCEDURE — 97110 THERAPEUTIC EXERCISES: CPT

## 2020-08-03 PROCEDURE — 87077 CULTURE AEROBIC IDENTIFY: CPT | Performed by: FAMILY MEDICINE

## 2020-08-03 PROCEDURE — 87186 SC STD MICRODIL/AGAR DIL: CPT | Performed by: FAMILY MEDICINE

## 2020-08-03 PROCEDURE — 99214 OFFICE O/P EST MOD 30 MIN: CPT | Performed by: FAMILY MEDICINE

## 2020-08-03 NOTE — PROGRESS NOTES
Raya Reddy 1965 female MRN: [id number]    Family Medicine Acute Visit    ASSESSMENT/PLAN  Problem List Items Addressed This Visit        Nervous and Auditory    Neuropathy       Other    Dysuria    Pain in both feet      Other Visit Diagnoses     History of UTI    -  Primary    Relevant Orders    POCT urine dip (Completed)    Urine culture          Patient here today for possible UTI  She states her symptoms already were improving  UA was abnormal but patient wishes to see culture results before starting abx  Will send in rx if needed pending her results  She is seeing podiatry for her foot pain  I do hear her report what sounds like neuropathy as well  I advised lab work would be warranted  She is actually due and has apt with her PCP next week and so she wants to get everything done at one time at the lab and so will discuss this at that time  Future Appointments   Date Time Provider Della Bennett   8/11/2020  7:30 AM Ashley Dawson, PT [facility]   8/13/2020  1:45 PM DO FILE[facility]          SUBJECTIVE  CC: Difficulty Urinating (Pain during urination )      HPI:  Raya Reddy is a 53 y o  female who presents for acute visit  States last week she had painful urination lasted a few days and currently no pain  No blood  Wondering if she irritated anything during sex  Has not seen gyn in over a year  Reports tingling in her feet and burning  She is seeing podiatry since March  Told she has plantar fascitis  She became vegetarian and is wondering if her diet is causing her to have neuropathy  Review of Systems   Constitutional: Negative for chills, fatigue and fever  HENT: Negative for congestion, postnasal drip, rhinorrhea and sinus pressure  Eyes: Negative for photophobia and visual disturbance  Respiratory: Negative for cough and shortness of breath  Cardiovascular: Negative for chest pain, palpitations and leg swelling  Gastrointestinal: Negative for abdominal pain, constipation, diarrhea, nausea and vomiting  Genitourinary: Negative for difficulty urinating and dysuria  Musculoskeletal: Negative for arthralgias and myalgias  Skin: Negative for color change and rash  Neurological: Negative for dizziness, weakness, light-headedness and headaches         Historical Information   The patient history was reviewed as follows:  Past Medical History:   Diagnosis Date    Abnormal Pap smear of cervix     Anemia     Atypical nevus of scalp     last assessed 5/11/17     Basal cell carcinoma of scalp     last assessed 5/11/17     Breast lump     last assessed 5/12/14     Cancer (HCC)     BCC-left nose    Cervical polyp     Fibroid     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    Leukopenia     Menorrhagia     last assessed 5/12/14     Metrorrhagia     last assessed 10/22/15     Ovarian cyst     left    Urinary tract infection          Past Surgical History:   Procedure Laterality Date    BREAST BIOPSY Right 2011    percutan needle core use imag guide stereotactic / benign    BREAST SURGERY Right     bx    COLONOSCOPY      MD EXC SKIN BENIG >4 CM REMAINDR BODY N/A 1/30/2017    Procedure: SCALP SUSPICIOUS LESION EXCISION;  Surgeon: Gloria Mckeon MD;  [facility];  Service: Plastics    MD RECMPL WND SCALP,EXTR 2 6-7 5 CM N/A 1/30/2017    Procedure: COMPLEX CLOSURE;  Surgeon: Gloria Mckeon MD;  [facility];  Service: Plastics    SKIN BIOPSY      basal cell ca    SKIN CANCER EXCISION      nose     Family History   Problem Relation Age of Onset    Breast cancer Mother 39    Heart disease Maternal Grandmother     Heart disease Paternal Grandmother     Heart disease Paternal Grandfather     Bone cancer Father 61    No Known Problems Maternal Aunt     No Known Problems Paternal Aunt     No Known Problems Paternal Aunt     No Known Problems Paternal Aunt     No Known Problems Paternal Aunt  No Known Problems Paternal Aunt     Breast cancer Cousin 48    Bone cancer Cousin 48      Social History   Social History     Substance and Sexual Activity   Alcohol Use No    Comment: social drinker per allscript      Social History     Substance and Sexual Activity   Drug Use No     Social History     Tobacco Use   Smoking Status Never Smoker   Smokeless Tobacco Never Used       Medications:     Current Outpatient Medications:     Multiple Vitamin (MULTIVITAMIN) tablet, Take 1 tablet by mouth daily, Disp: , Rfl:     No Known Allergies    OBJECTIVE  Vitals:   Vitals:    08/03/20 0818   BP: 100/70   BP Location: Right arm   Patient Position: Sitting   Cuff Size: Standard   Pulse: 68   Temp: 97 9 °F (36 6 °C)   TempSrc: Tympanic   SpO2: 99%   Weight: 61 2 kg (135 lb)   Height: 5' 10 5"         Physical Exam   Constitutional: She is oriented to person, place, and time  She appears well-developed  HENT:   Head: Normocephalic and atraumatic  Eyes: Pupils are equal, round, and reactive to light  Neck: Normal range of motion  Neck supple  Cardiovascular: Normal rate, regular rhythm and normal heart sounds  Pulmonary/Chest: Effort normal and breath sounds normal  No respiratory distress  She has no wheezes  Abdominal: Soft  Bowel sounds are normal  She exhibits no distension  There is no abdominal tenderness  Musculoskeletal: Normal range of motion  General: No tenderness  Neurological: She is alert and oriented to person, place, and time  Skin: Skin is warm and dry     Psychiatric: Her behavior is normal                   Tia Cornell DO    8/3/2020

## 2020-08-03 NOTE — PROGRESS NOTES
Daily Note     Today's date: 8/3/2020  Patient name: Raya Reddy  : 1965  MRN: [id number]  Referring provider: Mihaela Brink DPM  Dx:   Encounter Diagnosis     ICD-10-CM    1  Sprain of right ankle, unspecified ligament, subsequent encounter  S93 401D    2  Other specified injury of intrinsic muscle and tendon at ankle and foot level, right foot, initial encounter  S96 291A    3  Plantar fasciitis  M72 2                   Subjective: pt reports that she is overall definitely better than she was but over weekend she needed to go walk [place] and she is noting that her numbness is a little worse at 4/10 but her heel pain is not  Objective: See treatment diary below      Assessment: Tolerated treatment with minimal sxs in foot during treatment, she noted some increased tightness in calf with HR  Patient notes that she thinks she is pushing harder in gastroc stretch at home vs here, discussed decrease in intensity to avoid increasing tightness  Plan: Continue per plan of care        POC EXPIRES On:  20  PRECAUTIONS:  None  CO-MORBIDITES:  Anemia  PERSONAL FACTORS:  None          7/15 7/21 8/3        Manuals HEP           Right gastroc stm   DB DB DL         right sciatic sliders  DB  DB  DL          right TC AP mobs   DB  DB                             Neuro Re-Ed                 8/3                                     SLS R on foam   nv 15''x5 15''x5 15"x5       Sciatic sliders supine and seated  2x10 ea 2x10+ankle circles x10 ea Sciatic sliders/tensioners 3x10 ea x10                                                       Ther Ex           Foam roller right gastroc  nv hold         Soleus lunge  15''x2 15''x3          step stretch   15''x2  15''x3   Lunge 15"X3       Ankle HR/TR   nv 2x10 ea  x10 stand ea        banded tc mobs   nv          Bike   nv lv 1 6' lv 1 8' lv1 8'        ankle 4 way     Green 2x10 ea  green 2x10  green 2x10        tennis ball massage       2'  2'       Ther Activity                                               Gait Training                                               Modalities

## 2020-08-05 ENCOUNTER — TELEPHONE (OUTPATIENT)
Dept: FAMILY MEDICINE CLINIC | Facility: CLINIC | Age: 55
End: 2020-08-05

## 2020-08-05 DIAGNOSIS — R30.0 DYSURIA: ICD-10-CM

## 2020-08-05 DIAGNOSIS — R30.0 DYSURIA: Primary | ICD-10-CM

## 2020-08-05 LAB — BACTERIA UR CULT: ABNORMAL

## 2020-08-05 RX ORDER — NITROFURANTOIN 25; 75 MG/1; MG/1
100 CAPSULE ORAL 2 TIMES DAILY
Qty: 10 CAPSULE | Refills: 0 | Status: SHIPPED | OUTPATIENT
Start: 2020-08-05 | End: 2020-08-05 | Stop reason: SDUPTHER

## 2020-08-05 RX ORDER — NITROFURANTOIN 25; 75 MG/1; MG/1
100 CAPSULE ORAL 2 TIMES DAILY
Qty: 10 CAPSULE | Refills: 0 | Status: SHIPPED | OUTPATIENT
Start: 2020-08-05 | End: 2020-08-10

## 2020-08-11 ENCOUNTER — OFFICE VISIT (OUTPATIENT)
Dept: PHYSICAL THERAPY | Facility: CLINIC | Age: 55
End: 2020-08-11
Payer: OTHER GOVERNMENT

## 2020-08-11 DIAGNOSIS — S93.401D SPRAIN OF RIGHT ANKLE, UNSPECIFIED LIGAMENT, SUBSEQUENT ENCOUNTER: Primary | ICD-10-CM

## 2020-08-11 DIAGNOSIS — S93.401D SPRAIN OF LIGAMENT OF RIGHT ANKLE, SUBSEQUENT ENCOUNTER: ICD-10-CM

## 2020-08-11 DIAGNOSIS — M72.2 PLANTAR FASCIITIS: ICD-10-CM

## 2020-08-11 DIAGNOSIS — S96.291A OTHER SPECIFIED INJURY OF INTRINSIC MUSCLE AND TENDON AT ANKLE AND FOOT LEVEL, RIGHT FOOT, INITIAL ENCOUNTER: ICD-10-CM

## 2020-08-11 PROCEDURE — 97110 THERAPEUTIC EXERCISES: CPT

## 2020-08-11 PROCEDURE — 97112 NEUROMUSCULAR REEDUCATION: CPT

## 2020-08-11 PROCEDURE — 97140 MANUAL THERAPY 1/> REGIONS: CPT

## 2020-08-11 NOTE — PROGRESS NOTES
Daily Note     Today's date: 2020  Patient name: Raya Reddy  : 1965  MRN: [id number]  Referring provider: Mihaela Brink DPM  Dx:   Encounter Diagnosis     ICD-10-CM    1  Sprain of right ankle, unspecified ligament, subsequent encounter  S93 401D    2  Other specified injury of intrinsic muscle and tendon at ankle and foot level, right foot, initial encounter  S96 291A    3  Plantar fasciitis  M72 2    4  Sprain of ligament of right ankle, subsequent encounter  S93 401D                   Subjective: pt notes that this morning she has minimal tingling and heel pain  She notes that she is getting back to feeling better       Objective: See treatment diary below      Assessment: Tolerated treatment with challenge on baps board for clockwise circles    Patient noted some tingling medial arch after sliders, after manual sliders into tibial slider, sxs decreased  Plan: Continue per plan of care        POC EXPIRES On:  20  PRECAUTIONS:  None  CO-MORBIDITES:  Anemia  PERSONAL FACTORS:  None          7/15 7/21 8/3 8/11       Manuals HEP           Right gastroc stm   DB DB DL DL        right sciatic sliders  DB  DB  DL  DL        right TC AP mobs   DB  DB                             Neuro Re-Ed                 8/3  8/11                                   SLS R on foam   nv 15''x5 15''x5 15"x5  15"x5     Sciatic sliders supine and seated  2x10 ea 2x10+ankle circles x10 ea Sciatic sliders/tensioners 3x10 ea x10  x10     baps seated       x10 ea                                         Ther Ex           Foam roller right gastroc  nv hold         Soleus lunge  15''x2 15''x3          step stretch   15''x2  15''x3   Lunge 15"X3  15"x3     Ankle HR/TR   nv 2x10 ea  x10 stand ea  x20      banded tc mobs   nv          Bike   nv lv 1 6' lv 1 8' lv1 8'  lv1 8'      ankle 4 way     Green 2x10 ea  green 2x10  green 2x10  Green 2x10      tennis ball massage       2'  2'  2' smooth blue ball     Ther Activity                                             Gait Training                                               Modalities

## 2020-08-13 ENCOUNTER — OFFICE VISIT (OUTPATIENT)
Dept: FAMILY MEDICINE CLINIC | Facility: CLINIC | Age: 55
End: 2020-08-13
Payer: OTHER GOVERNMENT

## 2020-08-13 VITALS
DIASTOLIC BLOOD PRESSURE: 60 MMHG | BODY MASS INDEX: 19.46 KG/M2 | WEIGHT: 139 LBS | HEIGHT: 71 IN | TEMPERATURE: 99.4 F | HEART RATE: 60 BPM | SYSTOLIC BLOOD PRESSURE: 80 MMHG

## 2020-08-13 DIAGNOSIS — R41.3 MEMORY LOSS: ICD-10-CM

## 2020-08-13 DIAGNOSIS — F41.9 ANXIETY: Primary | ICD-10-CM

## 2020-08-13 DIAGNOSIS — G62.9 PERIPHERAL POLYNEUROPATHY: ICD-10-CM

## 2020-08-13 PROCEDURE — 1036F TOBACCO NON-USER: CPT | Performed by: FAMILY MEDICINE

## 2020-08-13 PROCEDURE — 99214 OFFICE O/P EST MOD 30 MIN: CPT | Performed by: FAMILY MEDICINE

## 2020-08-13 RX ORDER — ESCITALOPRAM OXALATE 10 MG/1
10 TABLET ORAL DAILY
Qty: 30 TABLET | Refills: 5 | Status: SHIPPED | OUTPATIENT
Start: 2020-08-13 | End: 2020-09-11 | Stop reason: SDUPTHER

## 2020-08-13 NOTE — PROGRESS NOTES
Assessment/Plan:  Problem List Items Addressed This Visit        Nervous and Auditory    Peripheral polyneuropathy    Relevant Orders    CBC and differential (Completed)    Comprehensive metabolic panel (Completed)    Hemoglobin A1C (Completed)    T4, free (Completed)    TSH, 3rd generation (Completed)    Vitamin B12 (Completed)    Folate (Completed)    RPR (Completed)       Other    Anxiety - Primary    Relevant Medications    escitalopram (LEXAPRO) 10 mg tablet    Other Relevant Orders    CBC and differential (Completed)    Comprehensive metabolic panel (Completed)    Hemoglobin A1C (Completed)    T4, free (Completed)    TSH, 3rd generation (Completed)    Vitamin B12 (Completed)    Folate (Completed)    RPR (Completed)    Memory loss    Relevant Orders    CBC and differential (Completed)    Comprehensive metabolic panel (Completed)    Hemoglobin A1C (Completed)    T4, free (Completed)    TSH, 3rd generation (Completed)    Vitamin B12 (Completed)    Folate (Completed)    RPR (Completed)      The patient will go for the testing as ordered to evaluate her symptoms  Will follow up very closely with the results and have further instructions at that time  She will call with any worsening symptoms  I believe her symptoms may be related to anxiety and we are going to start her on the escitalopram 10 mg daily as ordered to help with her symptoms  This will help to control her anxiety  We will follow up with her in the office in 3 weeks as scheduled  Return in about 3 weeks (around 9/3/2020) for Recheck  Subjective:   Chief Complaint   Patient presents with    Anxiety    Memory Loss    Numbness     feet        Patient ID: Nikia Almanza is a 63 y o  female presents today for evaluation of anxiety, memory loss, and numbness in her feet  Clemencia Stoddard is a 63 y o  female who presents today for evaluation of anxiety, memory loss, and numbness in her feet    She has had problems with her anxiety and memory loss over the last few months  She had surgery on her hand and she was stressed out with that and trying to keep up with everything  She gets overwhelmed easily and angry easily  She has increased stress and it is affecting her memory  She has a hard time focusing  If she is writing something by hand, she will start writing the wrong words  She is an - sometimes when she is writing numbers she as a hard time processing things  She resigned from her job recently which will be effective 10/01 because she can't handle it and she was making more mistakes at work   She is not remembering her passwords  She has been having pins and needles pain in both feet that started at the same time-it comes and goes  She is still working- her boss was looking into shortening her hours  There are no suicidal ideations  There is mild depression symptoms  There are no headaches or visual changes  She is sleeping at night  She is having a hard time turning her mind off at night  The memory issues are getting worse  There is no polyuria or polydipsia  Anxiety   Presents for initial visit  Onset was 6 to 12 months ago  The problem has been gradually worsening  Symptoms include decreased concentration, depressed mood, excessive worry and nervous/anxious behavior  Patient reports no chest pain, compulsions, confusion, dizziness, dry mouth, feeling of choking, hyperventilation, impotence, insomnia, irritability, malaise, muscle tension, nausea, obsessions, palpitations, panic, restlessness, shortness of breath or suicidal ideas           The following portions of the patient's history were reviewed and updated as appropriate: allergies, current medications, past family history, past medical history, past social history, past surgical history and problem list   Patient Active Problem List   Diagnosis    Menorrhagia    Anemia    Cyst of left ovary    Dense breasts    Fibroids    Multiple nevi    Age-related incipient cataract of both eyes    Primary insomnia    Open fracture dislocation of distal interphalangeal (DIP) joint of finger    Dysuria    Neuropathy    Pain in both feet    Anxiety    Peripheral polyneuropathy    Memory loss     Past Medical History:   Diagnosis Date    Abnormal Pap smear of cervix     Anemia     Atypical nevus of scalp     last assessed 5/11/17     Basal cell carcinoma of scalp     last assessed 5/11/17     Breast lump     last assessed 5/12/14     Cancer (HCC)     BCC-left nose    Cervical polyp     Fibroid     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    Leukopenia     Menorrhagia     last assessed 5/12/14     Metrorrhagia     last assessed 10/22/15     Ovarian cyst     left    Urinary tract infection      Past Surgical History:   Procedure Laterality Date    BREAST BIOPSY Right 2011    percutan needle core use imag guide stereotactic / benign    BREAST SURGERY Right     bx    COLONOSCOPY      NM EXC SKIN BENIG >4 CM REMAINDR BODY N/A 1/30/2017    Procedure: SCALP SUSPICIOUS LESION EXCISION;  Surgeon: Sabina Mora MD;  [facility];  Service: Plastics    NM RECMPL WND SCALP,EXTR 2 6-7 5 CM N/A 1/30/2017    Procedure: COMPLEX CLOSURE;  Surgeon: Sabina Mora MD;  [facility];  Service: Plastics    SKIN BIOPSY      basal cell ca    SKIN CANCER EXCISION      nose     No Known Allergies  Family History   Problem Relation Age of Onset    Breast cancer Mother 39    Heart disease Maternal Grandmother     Heart disease Paternal Grandmother     Heart disease Paternal Grandfather     Bone cancer Father 61    No Known Problems Maternal Aunt     No Known Problems Paternal Aunt     No Known Problems Paternal Aunt     No Known Problems Paternal Aunt     No Known Problems Paternal Aunt     No Known Problems Paternal Aunt     Breast cancer Cousin 48    Bone cancer Cousin 48     Social History     Socioeconomic History    Marital status: /Civil Union     Spouse name: Not on file    Number of children: Not on file    Years of education: Not on file    Highest education level: Not on file   Occupational History    Occupation:     Social Needs    Financial resource strain: Not very hard    Food insecurity     Worry: Never true     Inability: Never true    Transportation needs     Medical: No     Non-medical: No   Tobacco Use    Smoking status: Never Smoker    Smokeless tobacco: Never Used   Substance and Sexual Activity    Alcohol use: No     Comment: social drinker per allscript     Drug use: No    Sexual activity: Yes     Birth control/protection: Spermicide   Lifestyle    Physical activity     Days per week: 0 days     Minutes per session: 0 min    Stress: Very much   Relationships    Social connections     Talks on phone: Once a week     Gets together: Once a week     Attends [employer] service: More than 4 times per year     Active member of club or organization: No     Attends meetings of clubs or organizations: Never     Relationship status:     Intimate partner violence     Fear of current or ex partner: No     Emotionally abused: No     Physically abused: No     Forced sexual activity: No   Other Topics Concern    Not on file   Social History Narrative    Caffeine use     Worship affiliation [employer]     Uses safety equipment seatbelts      Current Outpatient Medications on File Prior to Visit   Medication Sig Dispense Refill    Magnesium Hydroxide (MAGNESIA PO) Take by mouth      Multiple Vitamin (MULTIVITAMIN) tablet Take 1 tablet by mouth daily       No current facility-administered medications on file prior to visit  Review of Systems   Constitutional: Negative  Negative for irritability  HENT: Negative  Eyes: Negative  Respiratory: Negative  Negative for shortness of breath  Cardiovascular: Negative  Negative for chest pain and palpitations  Gastrointestinal: Negative  Negative for nausea  Endocrine: Negative  Genitourinary: Negative  Negative for impotence  Musculoskeletal: Negative  Skin: Negative  Allergic/Immunologic: Negative  Neurological: Negative  Negative for dizziness  Hematological: Negative  Psychiatric/Behavioral: Positive for decreased concentration  Negative for confusion and suicidal ideas  The patient is nervous/anxious  The patient does not have insomnia  Objective:  Vitals:    08/13/20 1353   BP: (!) 80/60   BP Location: Left arm   Patient Position: Sitting   Cuff Size: Standard   Pulse: 60   Temp: 99 4 °F (37 4 °C)   TempSrc: Tympanic   Weight: 63 kg (139 lb)   Height: 5' 10 5" (1 791 m)     Body mass index is 19 66 kg/m²  Physical Exam  Constitutional:       Appearance: She is well-developed  HENT:      Head: Normocephalic and atraumatic  Mouth/Throat:      Pharynx: No oropharyngeal exudate  Eyes:      Conjunctiva/sclera: Conjunctivae normal       Pupils: Pupils are equal, round, and reactive to light  Neck:      Musculoskeletal: Normal range of motion  Thyroid: No thyromegaly  Vascular: No JVD  Trachea: No tracheal deviation  Cardiovascular:      Rate and Rhythm: Normal rate and regular rhythm  Heart sounds: Normal heart sounds  No murmur  No friction rub  No gallop  Pulmonary:      Effort: Pulmonary effort is normal  No respiratory distress  Breath sounds: Normal breath sounds  No stridor  No wheezing or rales  Chest:      Chest wall: No tenderness  Abdominal:      General: Bowel sounds are normal  There is no distension  Palpations: Abdomen is soft  There is no mass  Tenderness: There is no abdominal tenderness  There is no guarding or rebound  Musculoskeletal: Normal range of motion  General: No tenderness or deformity  Lymphadenopathy:      Cervical: No cervical adenopathy  Skin:     General: Skin is warm and dry  Neurological:      Mental Status: She is alert and oriented to person, place, and time  Cranial Nerves: No cranial nerve deficit  Motor: No abnormal muscle tone  Coordination: Coordination normal       Deep Tendon Reflexes: Reflexes are normal and symmetric  Reflexes normal    Psychiatric:         Mood and Affect: Mood normal          Behavior: Behavior normal          Thought Content:  Thought content normal          Judgment: Judgment normal

## 2020-08-14 ENCOUNTER — APPOINTMENT (OUTPATIENT)
Dept: LAB | Facility: CLINIC | Age: 55
End: 2020-08-14
Payer: OTHER GOVERNMENT

## 2020-08-14 DIAGNOSIS — G62.9 PERIPHERAL POLYNEUROPATHY: ICD-10-CM

## 2020-08-14 DIAGNOSIS — R41.3 MEMORY LOSS: ICD-10-CM

## 2020-08-14 DIAGNOSIS — F41.9 ANXIETY: ICD-10-CM

## 2020-08-14 LAB
ALBUMIN SERPL BCP-MCNC: 3.8 G/DL (ref 3.5–5)
ALP SERPL-CCNC: 86 U/L (ref 46–116)
ALT SERPL W P-5'-P-CCNC: 26 U/L (ref 12–78)
ANION GAP SERPL CALCULATED.3IONS-SCNC: 5 MMOL/L (ref 4–13)
AST SERPL W P-5'-P-CCNC: 22 U/L (ref 5–45)
BASOPHILS # BLD AUTO: 0.04 THOUSANDS/ΜL (ref 0–0.1)
BASOPHILS NFR BLD AUTO: 1 % (ref 0–1)
BILIRUB SERPL-MCNC: 0.4 MG/DL (ref 0.2–1)
BUN SERPL-MCNC: 11 MG/DL (ref 5–25)
CALCIUM SERPL-MCNC: 8.9 MG/DL (ref 8.3–10.1)
CHLORIDE SERPL-SCNC: 109 MMOL/L (ref 100–108)
CO2 SERPL-SCNC: 30 MMOL/L (ref 21–32)
CREAT SERPL-MCNC: 0.87 MG/DL (ref 0.6–1.3)
EOSINOPHIL # BLD AUTO: 0.11 THOUSAND/ΜL (ref 0–0.61)
EOSINOPHIL NFR BLD AUTO: 3 % (ref 0–6)
ERYTHROCYTE [DISTWIDTH] IN BLOOD BY AUTOMATED COUNT: 11.9 % (ref 11.6–15.1)
EST. AVERAGE GLUCOSE BLD GHB EST-MCNC: 105 MG/DL
FOLATE SERPL-MCNC: >20 NG/ML (ref 3.1–17.5)
GFR SERPL CREATININE-BSD FRML MDRD: 75 ML/MIN/1.73SQ M
GLUCOSE P FAST SERPL-MCNC: 94 MG/DL (ref 65–99)
HBA1C MFR BLD: 5.3 %
HCT VFR BLD AUTO: 41 % (ref 34.8–46.1)
HGB BLD-MCNC: 14.2 G/DL (ref 11.5–15.4)
IMM GRANULOCYTES # BLD AUTO: 0.01 THOUSAND/UL (ref 0–0.2)
IMM GRANULOCYTES NFR BLD AUTO: 0 % (ref 0–2)
LYMPHOCYTES # BLD AUTO: 0.97 THOUSANDS/ΜL (ref 0.6–4.47)
LYMPHOCYTES NFR BLD AUTO: 24 % (ref 14–44)
MCH RBC QN AUTO: 34.6 PG (ref 26.8–34.3)
MCHC RBC AUTO-ENTMCNC: 34.6 G/DL (ref 31.4–37.4)
MCV RBC AUTO: 100 FL (ref 82–98)
MONOCYTES # BLD AUTO: 0.28 THOUSAND/ΜL (ref 0.17–1.22)
MONOCYTES NFR BLD AUTO: 7 % (ref 4–12)
NEUTROPHILS # BLD AUTO: 2.63 THOUSANDS/ΜL (ref 1.85–7.62)
NEUTS SEG NFR BLD AUTO: 65 % (ref 43–75)
NRBC BLD AUTO-RTO: 0 /100 WBCS
PLATELET # BLD AUTO: 159 THOUSANDS/UL (ref 149–390)
PMV BLD AUTO: 10.3 FL (ref 8.9–12.7)
POTASSIUM SERPL-SCNC: 4.3 MMOL/L (ref 3.5–5.3)
PROT SERPL-MCNC: 6.8 G/DL (ref 6.4–8.2)
RBC # BLD AUTO: 4.1 MILLION/UL (ref 3.81–5.12)
RPR SER QL: NORMAL
SODIUM SERPL-SCNC: 144 MMOL/L (ref 136–145)
T4 FREE SERPL-MCNC: 0.88 NG/DL (ref 0.76–1.46)
TSH SERPL DL<=0.05 MIU/L-ACNC: 1.8 UIU/ML (ref 0.36–3.74)
VIT B12 SERPL-MCNC: 1004 PG/ML (ref 100–900)
WBC # BLD AUTO: 4.04 THOUSAND/UL (ref 4.31–10.16)

## 2020-08-14 PROCEDURE — 85025 COMPLETE CBC W/AUTO DIFF WBC: CPT

## 2020-08-14 PROCEDURE — 84443 ASSAY THYROID STIM HORMONE: CPT

## 2020-08-14 PROCEDURE — 82746 ASSAY OF FOLIC ACID SERUM: CPT

## 2020-08-14 PROCEDURE — 80053 COMPREHEN METABOLIC PANEL: CPT

## 2020-08-14 PROCEDURE — 83036 HEMOGLOBIN GLYCOSYLATED A1C: CPT

## 2020-08-14 PROCEDURE — 82607 VITAMIN B-12: CPT

## 2020-08-14 PROCEDURE — 84439 ASSAY OF FREE THYROXINE: CPT

## 2020-08-14 PROCEDURE — 86592 SYPHILIS TEST NON-TREP QUAL: CPT

## 2020-08-14 PROCEDURE — 36415 COLL VENOUS BLD VENIPUNCTURE: CPT

## 2020-08-17 ENCOUNTER — OFFICE VISIT (OUTPATIENT)
Dept: PHYSICAL THERAPY | Facility: CLINIC | Age: 55
End: 2020-08-17
Payer: OTHER GOVERNMENT

## 2020-08-17 DIAGNOSIS — M72.2 PLANTAR FASCIITIS: ICD-10-CM

## 2020-08-17 DIAGNOSIS — S93.401D SPRAIN OF RIGHT ANKLE, UNSPECIFIED LIGAMENT, SUBSEQUENT ENCOUNTER: Primary | ICD-10-CM

## 2020-08-17 DIAGNOSIS — S96.291A OTHER SPECIFIED INJURY OF INTRINSIC MUSCLE AND TENDON AT ANKLE AND FOOT LEVEL, RIGHT FOOT, INITIAL ENCOUNTER: ICD-10-CM

## 2020-08-17 PROBLEM — R41.3 MEMORY LOSS: Status: ACTIVE | Noted: 2020-08-17

## 2020-08-17 PROBLEM — G62.9 PERIPHERAL POLYNEUROPATHY: Status: ACTIVE | Noted: 2020-08-17

## 2020-08-17 PROBLEM — F41.9 ANXIETY: Status: ACTIVE | Noted: 2020-08-17

## 2020-08-17 PROCEDURE — 97110 THERAPEUTIC EXERCISES: CPT

## 2020-08-17 PROCEDURE — 97140 MANUAL THERAPY 1/> REGIONS: CPT

## 2020-08-17 NOTE — PROGRESS NOTES
Daily Note     Today's date: 2020  Patient name: Camilla Castellon  : 1965  MRN: [id number]  Referring provider: April Lara DPM  Dx:   Encounter Diagnosis     ICD-10-CM    1  Sprain of right ankle, unspecified ligament, subsequent encounter  S93 401D    2  Other specified injury of intrinsic muscle and tendon at ankle and foot level, right foot, initial encounter  S96 291A    3  Plantar fasciitis  M72 2                   Subjective: a lot of heel pain       Objective: See treatment diary below      Assessment: Tolerated treatment with no tingling in feet during treatment session    Patient was instructed to perform sliders several times throughout the day when she is feeling the sxs in her foot  She is to monitor sxs and if the sxs are coming down she should do them but if they increase sxs stop       Plan: Continue per plan of care        POC EXPIRES On:  20  PRECAUTIONS:  None  CO-MORBIDITES:  Anemia  PERSONAL FACTORS:  None          7/15 7/21 8/3 8/11       Manuals HEP           Right gastroc stm   DB DB DL DL        right sciatic sliders  DB  DB  DL  DL        right TC AP mobs   DB  DB                             Neuro Re-Ed                 8/3  8/11                                   SLS R on foam   nv 15''x5 15''x5 15"x5  15"x5 15"x5    Sciatic sliders supine and seated  2x10 ea 2x10+ankle circles x10 ea Sciatic sliders/tensioners 3x10 ea x10  x10     baps seated       x10 ea x20                                        Ther Ex           Foam roller right gastroc  nv hold         Soleus lunge  15''x2 15''x3          step stretch   15''x2  15''x3   Lunge 15"X3  15"x3 15"x3    Ankle HR/TR   nv 2x10 ea  x10 stand ea  x20 nv     banded tc mobs   nv          Bike   nv lv 1 6' lv 1 8' lv1 8'  lv1 8' lv1 10'     ankle 4 way     Green 2x10 ea  green 2x10  green 2x10  Green 2x10 gtb 2x10     tennis ball massage       2'  2'  2' smooth blue ball 2'    Ther Activity                                         Left 3rd message for Maddison to return call  Purvi Vela RN     Gait Training                                               Modalities

## 2020-08-27 ENCOUNTER — OFFICE VISIT (OUTPATIENT)
Dept: PHYSICAL THERAPY | Facility: CLINIC | Age: 55
End: 2020-08-27
Payer: OTHER GOVERNMENT

## 2020-08-27 DIAGNOSIS — M72.2 PLANTAR FASCIITIS: ICD-10-CM

## 2020-08-27 DIAGNOSIS — S96.291A OTHER SPECIFIED INJURY OF INTRINSIC MUSCLE AND TENDON AT ANKLE AND FOOT LEVEL, RIGHT FOOT, INITIAL ENCOUNTER: ICD-10-CM

## 2020-08-27 DIAGNOSIS — S93.401D SPRAIN OF RIGHT ANKLE, UNSPECIFIED LIGAMENT, SUBSEQUENT ENCOUNTER: Primary | ICD-10-CM

## 2020-08-27 PROCEDURE — 97110 THERAPEUTIC EXERCISES: CPT | Performed by: PHYSICAL THERAPIST

## 2020-08-27 PROCEDURE — 97112 NEUROMUSCULAR REEDUCATION: CPT | Performed by: PHYSICAL THERAPIST

## 2020-08-27 PROCEDURE — 97140 MANUAL THERAPY 1/> REGIONS: CPT | Performed by: PHYSICAL THERAPIST

## 2020-08-27 NOTE — PROGRESS NOTES
Daily Note     Today's date: 2020  Patient name: Barbra Mccarty  : 1965  MRN: [id number]  Referring provider: Oneil Vale DPM  Dx:   Encounter Diagnosis     ICD-10-CM    1  Sprain of right ankle, unspecified ligament, subsequent encounter  S93 401D    2  Other specified injury of intrinsic muscle and tendon at ankle and foot level, right foot, initial encounter  S96 291A    3  Plantar fasciitis  M72 2                   Subjective: pt notes that since she was put on meds for her anxiety her foot tingling has been non existant  Objective: See treatment diary below      Assessment: pt with improved tolerance to activity today, and increased willingness to perform more exercises  Plan: Continue per plan of care        POC EXPIRES On:  20  PRECAUTIONS:  None  CO-MORBIDITES:  Anemia  PERSONAL FACTORS:  None          7/15 7/21 8/3 8/11    8/27   Manuals HEP           Right gastroc stm   DB DB DL DL    DB    right sciatic sliders  DB  DB  DL  DL        right TC AP mobs   DB  DB                             Neuro Re-Ed                 8/3  8/11  8/27                                 SLS R on foam   nv 15''x5 15''x5 15"x5  15"x5 15"x5 15''x5   Sciatic sliders supine and seated  2x10 ea 2x10+ankle circles x10 ea Sciatic sliders/tensioners 3x10 ea x10  x10 x10    baps seated       x10 ea x20 lv 3 2x10 ea                                       Ther Ex           Foam roller right gastroc  nv hold         Soleus lunge  15''x2 15''x3          step stretch   15''x2  15''x3   Lunge 15"X3  15"x3 15"x3 15''x3   Ankle HR/TR   nv 2x10 ea  x10 stand ea  x20 nv 2x10    banded tc mobs   nv          Bike   nv lv 1 6' lv 1 8' lv1 8'  lv1 8' lv1 10' lv 1 10'    ankle 4 way     Green 2x10 ea  green 2x10  green 2x10  Green 2x10 gtb 2x10 btb 2x10    tennis ball massage       2'  2'  2' smooth blue ball 2' 2'   Ther Activity                                               Gait Training                                         Modalities

## 2020-09-01 ENCOUNTER — OFFICE VISIT (OUTPATIENT)
Dept: PHYSICAL THERAPY | Facility: CLINIC | Age: 55
End: 2020-09-01
Payer: OTHER GOVERNMENT

## 2020-09-01 DIAGNOSIS — S93.401D SPRAIN OF RIGHT ANKLE, UNSPECIFIED LIGAMENT, SUBSEQUENT ENCOUNTER: Primary | ICD-10-CM

## 2020-09-01 DIAGNOSIS — S93.401D SPRAIN OF LIGAMENT OF RIGHT ANKLE, SUBSEQUENT ENCOUNTER: ICD-10-CM

## 2020-09-01 DIAGNOSIS — S96.291A OTHER SPECIFIED INJURY OF INTRINSIC MUSCLE AND TENDON AT ANKLE AND FOOT LEVEL, RIGHT FOOT, INITIAL ENCOUNTER: ICD-10-CM

## 2020-09-01 DIAGNOSIS — M72.2 PLANTAR FASCIITIS: ICD-10-CM

## 2020-09-01 PROCEDURE — 97140 MANUAL THERAPY 1/> REGIONS: CPT | Performed by: PHYSICAL THERAPIST

## 2020-09-01 PROCEDURE — 97110 THERAPEUTIC EXERCISES: CPT | Performed by: PHYSICAL THERAPIST

## 2020-09-01 PROCEDURE — 97112 NEUROMUSCULAR REEDUCATION: CPT | Performed by: PHYSICAL THERAPIST

## 2020-09-01 NOTE — PROGRESS NOTES
Daily Note     Today's date: 2020  Patient name: Lety Sharp  : 1965  MRN: [id number]  Referring provider: Mark Savage DPM  Dx:   Encounter Diagnosis     ICD-10-CM    1  Sprain of right ankle, unspecified ligament, subsequent encounter  S93 401D    2  Other specified injury of intrinsic muscle and tendon at ankle and foot level, right foot, initial encounter  S96 291A    3  Plantar fasciitis  M72 2    4  Sprain of ligament of right ankle, subsequent encounter  S93 401D                   Subjective: pt notes that she would like to get back to walking but only when she is completely pain free      Objective: See treatment diary below      Assessment: advised pt to try walking 5 min per day since she can do this currently with out pain      Plan: Continue per plan of care        POC EXPIRES On:  20  PRECAUTIONS:  None  CO-MORBIDITES:  Anemia  PERSONAL FACTORS:  None          7/15 7/21 8/3 8/11    8/27   Manuals HEP           Right gastroc stm   DB DB DL DL    DB    right sciatic sliders  DB  DB  DL  DL        right TC AP mobs   DB  DB                             Neuro Re-Ed                 8/3  8/11  8/27                                 SLS R on foam   nv 15''x5 15''x5 15"x5  15"x5 15"x5 15''x5   Sciatic sliders supine and seated  2x10 ea 2x10+ankle circles x10 ea Sciatic sliders/tensioners 3x10 ea x10  x10 x10    baps seated       x10 ea x20 lv 3 2x10 ea                                       Ther Ex           Foam roller right gastroc  nv hold         Soleus lunge  15''x2 15''x3          step stretch   15''x2  15''x3   Lunge 15"X3  15"x3 15"x3 15''x3   Ankle HR/TR   nv 2x10 ea  x10 stand ea  x20 nv 2x10    banded tc mobs   nv          Bike   nv lv 1 6' lv 1 8' lv1 8'  lv1 8' lv1 10' lv 1 10'    ankle 4 way     Green 2x10 ea  green 2x10  green 2x10  Green 2x10 gtb 2x10 btb 2x10    tennis ball massage       2'  2'  2' smooth blue ball 2' 2'   Ther Activity                                         Gait Training                                               Modalities

## 2020-09-11 ENCOUNTER — OFFICE VISIT (OUTPATIENT)
Dept: FAMILY MEDICINE CLINIC | Facility: CLINIC | Age: 55
End: 2020-09-11
Payer: OTHER GOVERNMENT

## 2020-09-11 ENCOUNTER — OFFICE VISIT (OUTPATIENT)
Dept: PHYSICAL THERAPY | Facility: CLINIC | Age: 55
End: 2020-09-11
Payer: OTHER GOVERNMENT

## 2020-09-11 VITALS
SYSTOLIC BLOOD PRESSURE: 90 MMHG | HEART RATE: 65 BPM | BODY MASS INDEX: 20.22 KG/M2 | OXYGEN SATURATION: 98 % | DIASTOLIC BLOOD PRESSURE: 62 MMHG | HEIGHT: 70 IN | TEMPERATURE: 97.9 F | WEIGHT: 141.2 LBS

## 2020-09-11 DIAGNOSIS — Z12.31 VISIT FOR SCREENING MAMMOGRAM: ICD-10-CM

## 2020-09-11 DIAGNOSIS — R41.3 MEMORY LOSS: ICD-10-CM

## 2020-09-11 DIAGNOSIS — S96.291A OTHER SPECIFIED INJURY OF INTRINSIC MUSCLE AND TENDON AT ANKLE AND FOOT LEVEL, RIGHT FOOT, INITIAL ENCOUNTER: ICD-10-CM

## 2020-09-11 DIAGNOSIS — S93.401D SPRAIN OF LIGAMENT OF RIGHT ANKLE, SUBSEQUENT ENCOUNTER: ICD-10-CM

## 2020-09-11 DIAGNOSIS — F41.9 ANXIETY: Primary | ICD-10-CM

## 2020-09-11 DIAGNOSIS — S93.401D SPRAIN OF RIGHT ANKLE, UNSPECIFIED LIGAMENT, SUBSEQUENT ENCOUNTER: Primary | ICD-10-CM

## 2020-09-11 DIAGNOSIS — M72.2 PLANTAR FASCIITIS: ICD-10-CM

## 2020-09-11 PROCEDURE — 97140 MANUAL THERAPY 1/> REGIONS: CPT

## 2020-09-11 PROCEDURE — 99214 OFFICE O/P EST MOD 30 MIN: CPT | Performed by: FAMILY MEDICINE

## 2020-09-11 PROCEDURE — 97110 THERAPEUTIC EXERCISES: CPT

## 2020-09-11 PROCEDURE — 97112 NEUROMUSCULAR REEDUCATION: CPT

## 2020-09-11 RX ORDER — ESCITALOPRAM OXALATE 10 MG/1
15 TABLET ORAL DAILY
Qty: 30 TABLET | Refills: 5 | Status: SHIPPED | OUTPATIENT
Start: 2020-09-11 | End: 2020-09-30 | Stop reason: ALTCHOICE

## 2020-09-11 NOTE — PROGRESS NOTES
Daily Note     Today's date: 2020  Patient name: Danyell Calvillo  : 1965  MRN: [id number]  Referring provider: Dot Harrison DPM  Dx:   Encounter Diagnosis     ICD-10-CM    1  Sprain of right ankle, unspecified ligament, subsequent encounter  S93 401D    2  Other specified injury of intrinsic muscle and tendon at ankle and foot level, right foot, initial encounter  S96 291A    3  Plantar fasciitis  M72 2    4  Sprain of ligament of right ankle, subsequent encounter  S93 401D                   Subjective: pt notes that she has been feeling much better and that she does not feel       Objective: See treatment diary below      Assessment: Tolerated treatment with challenge on wobble board both directions  Patient expresses concern again with trying to get back into things because of possible increase of sxs  Discussed and encouraged short durations of activity to assess sxs post again, otherwise pt will not build endurance to resume activities she  Would like to return to  Pt hesitate and only semi receptive to discussion  Plan: Continue per plan of care   Trial TM nv for 5 minutes as pt would like to get back to more walking as a goal        PRECAUTIONS:  None  CO-MORBIDITES:  Anemia  PERSONAL FACTORS:  None             Manuals HEP           Right gastroc stm   DL +heel       DB    right sciatic sliders             right TC AP mobs                               Neuro Re-Ed                 Wobble board  x20                            SLS R on foam   15"x5       15''x5   Sciatic sliders supine and seated            baps seated  lv 3 all x20 ea       lv 3 2x10 ea                                       Ther Ex           Foam roller right gastroc            Soleus lunge  15"x3           step stretch          15''x3   Ankle HR/TR   2x10       2x10    banded tc mobs             Bike   lv1 10'       lv 1 10'    ankle 4 way   blk  x20       btb 2x10    tennis ball massage   2'   Ther Activity                                               Gait Training                                               Modalities

## 2020-09-11 NOTE — PROGRESS NOTES
Assessment/Plan:  Problem List Items Addressed This Visit        Other    Anxiety - Primary     The patient is still having anxiety issues and is tolerating the escitalopram well  She is agreeable to try increasing the dose to 15 mg as ordered  We will monitor her closely and will see her back as scheduled  Relevant Medications    escitalopram (LEXAPRO) 10 mg tablet    Memory loss     I am going to refer her to Neurology for further evaluation of her memory loss  She will schedule this at her earliest convenience  Relevant Orders    Ambulatory referral to Neurology      Other Visit Diagnoses     Visit for screening mammogram        Relevant Orders    Mammo screening bilateral w 3d & cad          Return in about 1 month (around 10/11/2020) for Recheck  Subjective:   Chief Complaint   Patient presents with    Follow-up        Patient ID: Severo Luis is a 63 y o  female presents today for a routine follow-up  Severo Luis is a 63 y o  female who presents today for follow-up of her anxiety and to review her recent lab work  At her last visit, we started her on the escitalopram to treat the anxiety  She is here today for follow-up  She is sleeping a little better with the medication, but is still anxious  She will feel her hear racing at times  She will take a deep breath and she feels nervous  She is still having issues with her memory and short term memory loss  She has had issues with her memory since 2/2020 and she is getting worse  There  Is no numbness in her face  The neuropathy has subsided with started the escitalopram       Anxiety   Presents for follow-up visit  Symptoms include decreased concentration, excessive worry and nervous/anxious behavior   Patient reports no chest pain, compulsions, confusion, depressed mood, dizziness, dry mouth, feeling of choking, hyperventilation, impotence, insomnia, irritability, malaise, muscle tension, nausea, obsessions, palpitations, panic, restlessness, shortness of breath or suicidal ideas           The following portions of the patient's history were reviewed and updated as appropriate: allergies, current medications, past family history, past medical history, past social history, past surgical history and problem list   Patient Active Problem List   Diagnosis    Menorrhagia    Anemia    Cyst of left ovary    Dense breasts    Fibroids    Multiple nevi    Age-related incipient cataract of both eyes    Primary insomnia    Open fracture dislocation of distal interphalangeal (DIP) joint of finger    Dysuria    Neuropathy    Pain in both feet    Anxiety    Peripheral polyneuropathy    Memory loss     Past Medical History:   Diagnosis Date    Abnormal Pap smear of cervix     Anemia     Atypical nevus of scalp     last assessed 5/11/17     Basal cell carcinoma of scalp     last assessed 5/11/17     Breast lump     last assessed 5/12/14     Cancer ([facility] [address] )     BCC-left nose    Cervical polyp     Fibroid     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    Leukopenia     Menorrhagia     last assessed 5/12/14     Metrorrhagia     last assessed 10/22/15     Ovarian cyst     left    Urinary tract infection      Past Surgical History:   Procedure Laterality Date    BREAST BIOPSY Right 2011    percutan needle core use imag guide stereotactic / benign    BREAST SURGERY Right     bx    COLONOSCOPY      VT EXC SKIN BENIG >4 CM REMAINDR BODY N/A 1/30/2017    Procedure: SCALP SUSPICIOUS LESION EXCISION;  Surgeon: Laverne Smith MD;  Location: [facility];  Service: Plastics    VT RECMPL WND SCALP,EXTR 2 6-7 5 CM N/A 1/30/2017    Procedure: COMPLEX CLOSURE;  Surgeon: Laverne Smith MD;  Location: [facility];  Service: Plastics    SKIN BIOPSY      basal cell ca    SKIN CANCER EXCISION      nose     No Known Allergies  Family History   Problem Relation Age of Onset    Breast cancer Mother 39    Heart disease Maternal Grandmother     Heart disease Paternal Grandmother     Heart disease Paternal Grandfather     Bone cancer Father 61    No Known Problems Maternal Aunt     No Known Problems Paternal Aunt     No Known Problems Paternal Aunt     No Known Problems Paternal Aunt     No Known Problems Paternal Aunt     No Known Problems Paternal Aunt     Breast cancer Cousin 48    Bone cancer Cousin 48     Social History     Socioeconomic History    Marital status: /Civil Union     Spouse name: Not on file    Number of children: Not on file    Years of education: Not on file    Highest education level: Not on file   Occupational History    Occupation:     Social Needs    Financial resource strain: Not very hard    Food insecurity     Worry: Never true     Inability: Never true    Transportation needs     Medical: No     Non-medical: No   Tobacco Use    Smoking status: Never Smoker    Smokeless tobacco: Never Used   Substance and Sexual Activity    Alcohol use: No     Comment: social drinker per [employer]script     Drug use: No    Sexual activity: Yes     Birth control/protection: Spermicide   Lifestyle    Physical activity     Days per week: 0 days     Minutes per session: 0 min    Stress: Very much   Relationships    Social connections     Talks on phone: Once a week     Gets together: Once a week     Attends [employer] service: More than 4 times per year     Active member of club or organization: No     Attends meetings of clubs or organizations: Never     Relationship status:     Intimate partner violence     Fear of current or ex partner: No     Emotionally abused: No     Physically abused: No     Forced sexual activity: No   Other Topics Concern    Not on file   Social History Narrative    Caffeine use     Yazidism affiliation Taoist Qu[employer] disciples of mohit     Uses safety equipment seatbelts      Current Outpatient Medications on File Prior to Visit   Medication Sig Dispense Refill    Magnesium Hydroxide (MAGNESIA PO) Take by mouth      Multiple Vitamin (MULTIVITAMIN) tablet Take 1 tablet by mouth daily       No current facility-administered medications on file prior to visit  Review of Systems   Constitutional: Negative  Negative for irritability  HENT: Negative  Eyes: Negative  Respiratory: Negative  Negative for shortness of breath  Cardiovascular: Negative  Negative for chest pain and palpitations  Gastrointestinal: Negative  Negative for nausea  Endocrine: Negative  Genitourinary: Negative  Negative for impotence  Musculoskeletal: Negative  Skin: Negative  Allergic/Immunologic: Negative  Neurological: Negative  Negative for dizziness  Hematological: Negative  Psychiatric/Behavioral: Positive for decreased concentration  Negative for confusion and suicidal ideas  The patient is nervous/anxious  The patient does not have insomnia  Objective:  Vitals:    09/11/20 0913   BP: 90/62   BP Location: Left arm   Patient Position: Sitting   Cuff Size: Large   Pulse: 65   Temp: 97 9 °F (36 6 °C)   TempSrc: Tympanic   SpO2: 98%   Weight: 64 kg (141 lb 3 2 oz)   Height: 5' 10" (1 778 m)     Body mass index is 20 26 kg/m²  Physical Exam  Constitutional:       Appearance: She is well-developed  HENT:      Head: Normocephalic and atraumatic  Mouth/Throat:      Pharynx: No oropharyngeal exudate  Eyes:      Conjunctiva/sclera: Conjunctivae normal       Pupils: Pupils are equal, round, and reactive to light  Neck:      Musculoskeletal: Normal range of motion  Thyroid: No thyromegaly  Vascular: No JVD  Trachea: No tracheal deviation  Cardiovascular:      Rate and Rhythm: Normal rate and regular rhythm  Heart sounds: Normal heart sounds  No murmur  No friction rub  No gallop  Pulmonary:      Effort: Pulmonary effort is normal  No respiratory distress  Breath sounds: Normal breath sounds  No stridor   No wheezing or rales  Chest:      Chest wall: No tenderness  Abdominal:      General: Bowel sounds are normal  There is no distension  Palpations: Abdomen is soft  There is no mass  Tenderness: There is no abdominal tenderness  There is no guarding or rebound  Musculoskeletal: Normal range of motion  General: No tenderness or deformity  Lymphadenopathy:      Cervical: No cervical adenopathy  Skin:     General: Skin is warm and dry  Neurological:      Mental Status: She is alert and oriented to person, place, and time  Cranial Nerves: No cranial nerve deficit  Motor: No abnormal muscle tone  Coordination: Coordination normal       Deep Tendon Reflexes: Reflexes are normal and symmetric   Reflexes normal          Appointment on 08/14/2020   Component Date Value    WBC 08/14/2020 4 04*    RBC 08/14/2020 4 10     Hemoglobin 08/14/2020 14 2     Hematocrit 08/14/2020 41 0     MCV 08/14/2020 100*    MCH 08/14/2020 34 6*    MCHC 08/14/2020 34 6     RDW 08/14/2020 11 9     MPV 08/14/2020 10 3     Platelets 08/89/4803 159     nRBC 08/14/2020 0     Neutrophils Relative 08/14/2020 65     Immat GRANS % 08/14/2020 0     Lymphocytes Relative 08/14/2020 24     Monocytes Relative 08/14/2020 7     Eosinophils Relative 08/14/2020 3     Basophils Relative 08/14/2020 1     Neutrophils Absolute 08/14/2020 2 63     Immature Grans Absolute 08/14/2020 0 01     Lymphocytes Absolute 08/14/2020 0 97     Monocytes Absolute 08/14/2020 0 28     Eosinophils Absolute 08/14/2020 0 11     Basophils Absolute 08/14/2020 0 04     Sodium 08/14/2020 144     Potassium 08/14/2020 4 3     Chloride 08/14/2020 109*    CO2 08/14/2020 30     ANION GAP 08/14/2020 5     BUN 08/14/2020 11     Creatinine 08/14/2020 0 87     Glucose, Fasting 08/14/2020 94     Calcium 08/14/2020 8 9     AST 08/14/2020 22     ALT 08/14/2020 26     Alkaline Phosphatase 08/14/2020 86     Total Protein 08/14/2020 6  8     Albumin 08/14/2020 3 8     Total Bilirubin 08/14/2020 0 40     eGFR 08/14/2020 75     Hemoglobin A1C 08/14/2020 5 3     EAG 08/14/2020 105     Free T4 08/14/2020 0 88     TSH 3RD GENERATON 08/14/2020 1 800     Vitamin B-12 08/14/2020 1,004*    Folate 08/14/2020 >20 0*    RPR 08/14/2020 Non-Reactive    Office Visit on 08/03/2020   Component Date Value    LEUKOCYTE ESTERASE,UA 08/03/2020 Small     NITRITE,UA 08/03/2020 Negative     SL AMB POCT UROBILINOGEN 08/03/2020 0 2     POCT URINE PROTEIN 08/03/2020 Trace      PH,UA 08/03/2020 6 0     BLOOD,UA 08/03/2020 Negative     SPECIFIC GRAVITY,UA 08/03/2020 1 015     KETONES,UA 08/03/2020 Negative     BILIRUBIN,UA 08/03/2020 Negative     GLUCOSE, UA 08/03/2020 Negative      COLOR,UA 08/03/2020 Gold     CLARITY,UA 08/03/2020 Hazy     Urine Culture 08/03/2020 40,000-49,000 cfu/ml Escherichia coli*

## 2020-09-14 NOTE — ASSESSMENT & PLAN NOTE
The patient is still having anxiety issues and is tolerating the escitalopram well  She is agreeable to try increasing the dose to 15 mg as ordered  We will monitor her closely and will see her back as scheduled

## 2020-09-14 NOTE — ASSESSMENT & PLAN NOTE
I am going to refer her to Neurology for further evaluation of her memory loss  She will schedule this at her earliest convenience

## 2020-09-21 ENCOUNTER — OFFICE VISIT (OUTPATIENT)
Dept: PHYSICAL THERAPY | Facility: CLINIC | Age: 55
End: 2020-09-21
Payer: OTHER GOVERNMENT

## 2020-09-21 DIAGNOSIS — S93.401D SPRAIN OF RIGHT ANKLE, UNSPECIFIED LIGAMENT, SUBSEQUENT ENCOUNTER: Primary | ICD-10-CM

## 2020-09-21 DIAGNOSIS — S96.291A OTHER SPECIFIED INJURY OF INTRINSIC MUSCLE AND TENDON AT ANKLE AND FOOT LEVEL, RIGHT FOOT, INITIAL ENCOUNTER: ICD-10-CM

## 2020-09-21 DIAGNOSIS — S93.401D SPRAIN OF LIGAMENT OF RIGHT ANKLE, SUBSEQUENT ENCOUNTER: ICD-10-CM

## 2020-09-21 DIAGNOSIS — M72.2 PLANTAR FASCIITIS: ICD-10-CM

## 2020-09-21 PROCEDURE — 97110 THERAPEUTIC EXERCISES: CPT

## 2020-09-21 PROCEDURE — 97112 NEUROMUSCULAR REEDUCATION: CPT

## 2020-09-21 PROCEDURE — 97140 MANUAL THERAPY 1/> REGIONS: CPT

## 2020-09-21 NOTE — PROGRESS NOTES
Daily Note     Today's date: 2020  Patient name: Nikki Wang  : 1965  MRN: [id number]  Referring provider: Brooke Kline DPM  Dx:   Encounter Diagnosis     ICD-10-CM    1  Sprain of right ankle, unspecified ligament, subsequent encounter  S93 401D    2  Other specified injury of intrinsic muscle and tendon at ankle and foot level, right foot, initial encounter  S96 291A    3  Plantar fasciitis  M72 2    4  Sprain of ligament of right ankle, subsequent encounter  S93 401D                   Subjective: pt did walk about 1/2 mile over weekend and did other yard work over weekend also, and she noted some increased soreness in foot with feeling like something is in shoe  Objective: See treatment diary below      Assessment:   Patient was able to perform all exercises without c/o increased sxs in foot except with SLS withslight soreness medial arch, where she had more tightness today  Pt encouraged to continue to perform functional ADL's to assess sxs  Reassured her that reason for a bit more soreness is increased activity by several factors this weekend  , she states understanding  Plan: Progress treatment as tolerated         PRECAUTIONS:  None  CO-MORBIDITES:  Anemia  PERSONAL FACTORS:  None             Manuals HEP           Right gastroc stm   DL +heel DL and foot      DB    right sciatic sliders             right TC AP mobs                               Neuro Re-Ed                 Wobble board  x20 x20 ea                           SLS R on foam   15"x5 15"x5      15''x5   Sciatic sliders supine and seated            baps seated  lv 3 all x20 ea lv3 all 20ea      lv 3 2x10 ea                                       Ther Ex           Foam roller right gastroc            Soleus lunge  15"x3 15"x3          step stretch          15''x3   Ankle HR/TR   2x10 2x10      2x10    banded tc mobs             Bike   lv1 10' lv1 10'      lv 1 10'    ankle 4 way   blk  x20 blk x20      btb 2x10    tennis ball massage          2'   Ther Activity                                               Gait Training                                               Modalities

## 2020-09-24 ENCOUNTER — TELEPHONE (OUTPATIENT)
Dept: OBGYN CLINIC | Facility: CLINIC | Age: 55
End: 2020-09-24

## 2020-09-24 NOTE — TELEPHONE ENCOUNTER
----- Message from Rodney Has sent at 8/25/2020  9:15 AM EDT -----  Regarding: FW: yearly appt    ----- Message -----  From: Rodney Has  Sent: 7/31/2020   9:57 AM EDT  To: Rodney Has  Subject: yearly appt                                      Please see if pt schedule her yearly

## 2020-09-28 ENCOUNTER — APPOINTMENT (OUTPATIENT)
Dept: PHYSICAL THERAPY | Facility: CLINIC | Age: 55
End: 2020-09-28
Payer: OTHER GOVERNMENT

## 2020-09-29 ENCOUNTER — TELEPHONE (OUTPATIENT)
Dept: FAMILY MEDICINE CLINIC | Facility: CLINIC | Age: 55
End: 2020-09-29

## 2020-09-29 NOTE — TELEPHONE ENCOUNTER
Patient called, she is asking on to stop taking her Lexapro  She said she doesn't want to take it  [phone]  Thank you

## 2020-09-30 NOTE — TELEPHONE ENCOUNTER
I spoke with the patient on 9/29/2020  The patient states that she feels that the escitalopram is not helping her in is actually making her focus worse  She wants to discontinue the medication  She is going to check with her insurance as far as seeing a therapist for counseling  Advised her that she can stop the escitalopram but will need to taper off of it gradually to avoid side effects  I gave her instructions on gradually wean you off the medication  We will monitor closely  She will call with any worsening symptoms

## 2020-10-02 ENCOUNTER — OFFICE VISIT (OUTPATIENT)
Dept: PHYSICAL THERAPY | Facility: CLINIC | Age: 55
End: 2020-10-02
Payer: OTHER GOVERNMENT

## 2020-10-02 DIAGNOSIS — S93.401D SPRAIN OF LIGAMENT OF RIGHT ANKLE, SUBSEQUENT ENCOUNTER: ICD-10-CM

## 2020-10-02 DIAGNOSIS — S96.291A OTHER SPECIFIED INJURY OF INTRINSIC MUSCLE AND TENDON AT ANKLE AND FOOT LEVEL, RIGHT FOOT, INITIAL ENCOUNTER: ICD-10-CM

## 2020-10-02 DIAGNOSIS — M72.2 PLANTAR FASCIITIS: ICD-10-CM

## 2020-10-02 DIAGNOSIS — S93.401D SPRAIN OF RIGHT ANKLE, UNSPECIFIED LIGAMENT, SUBSEQUENT ENCOUNTER: Primary | ICD-10-CM

## 2020-10-02 PROCEDURE — 97110 THERAPEUTIC EXERCISES: CPT

## 2020-10-02 PROCEDURE — 97140 MANUAL THERAPY 1/> REGIONS: CPT

## 2020-10-02 PROCEDURE — 97112 NEUROMUSCULAR REEDUCATION: CPT

## 2020-10-07 ENCOUNTER — TELEPHONE (OUTPATIENT)
Dept: FAMILY MEDICINE CLINIC | Facility: CLINIC | Age: 55
End: 2020-10-07

## 2020-10-08 ENCOUNTER — OFFICE VISIT (OUTPATIENT)
Dept: PHYSICAL THERAPY | Facility: CLINIC | Age: 55
End: 2020-10-08
Payer: OTHER GOVERNMENT

## 2020-10-08 DIAGNOSIS — S96.291A OTHER SPECIFIED INJURY OF INTRINSIC MUSCLE AND TENDON AT ANKLE AND FOOT LEVEL, RIGHT FOOT, INITIAL ENCOUNTER: ICD-10-CM

## 2020-10-08 DIAGNOSIS — S93.401D SPRAIN OF RIGHT ANKLE, UNSPECIFIED LIGAMENT, SUBSEQUENT ENCOUNTER: Primary | ICD-10-CM

## 2020-10-08 DIAGNOSIS — M72.2 PLANTAR FASCIITIS: ICD-10-CM

## 2020-10-08 DIAGNOSIS — S93.401D SPRAIN OF LIGAMENT OF RIGHT ANKLE, SUBSEQUENT ENCOUNTER: ICD-10-CM

## 2020-10-08 PROCEDURE — 97112 NEUROMUSCULAR REEDUCATION: CPT

## 2020-10-08 PROCEDURE — 97110 THERAPEUTIC EXERCISES: CPT

## 2020-10-08 PROCEDURE — 97140 MANUAL THERAPY 1/> REGIONS: CPT

## 2020-10-15 ENCOUNTER — APPOINTMENT (OUTPATIENT)
Dept: PHYSICAL THERAPY | Facility: CLINIC | Age: 55
End: 2020-10-15
Payer: OTHER GOVERNMENT

## 2020-10-21 ENCOUNTER — APPOINTMENT (OUTPATIENT)
Dept: RADIOLOGY | Facility: CLINIC | Age: 55
End: 2020-10-21
Payer: OTHER GOVERNMENT

## 2020-10-21 ENCOUNTER — OFFICE VISIT (OUTPATIENT)
Dept: FAMILY MEDICINE CLINIC | Facility: CLINIC | Age: 55
End: 2020-10-21
Payer: OTHER GOVERNMENT

## 2020-10-21 ENCOUNTER — EVALUATION (OUTPATIENT)
Dept: PHYSICAL THERAPY | Facility: CLINIC | Age: 55
End: 2020-10-21
Payer: OTHER GOVERNMENT

## 2020-10-21 VITALS
TEMPERATURE: 98.3 F | OXYGEN SATURATION: 100 % | HEART RATE: 70 BPM | RESPIRATION RATE: 18 BRPM | BODY MASS INDEX: 19.79 KG/M2 | WEIGHT: 138.2 LBS | DIASTOLIC BLOOD PRESSURE: 78 MMHG | SYSTOLIC BLOOD PRESSURE: 126 MMHG | HEIGHT: 70 IN
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
## 2020-10-21 DIAGNOSIS — M25.552 LEFT HIP PAIN: Primary | ICD-10-CM

## 2020-10-21 DIAGNOSIS — G89.29 CHRONIC LEFT HIP PAIN: ICD-10-CM

## 2020-10-21 DIAGNOSIS — M25.552 CHRONIC LEFT HIP PAIN: ICD-10-CM

## 2020-10-21 DIAGNOSIS — G89.29 CHRONIC LEFT SACROILIAC PAIN: ICD-10-CM

## 2020-10-21 DIAGNOSIS — M53.3 CHRONIC LEFT SACROILIAC PAIN: ICD-10-CM

## 2020-10-21 DIAGNOSIS — M53.3 PAIN OF LEFT SACROILIAC JOINT: ICD-10-CM

## 2020-10-21 DIAGNOSIS — F41.9 ANXIETY: Primary | ICD-10-CM

## 2020-10-21 DIAGNOSIS — H91.93 BILATERAL HEARING LOSS, UNSPECIFIED HEARING LOSS TYPE: ICD-10-CM

## 2020-10-21 PROCEDURE — 97164 PT RE-EVAL EST PLAN CARE: CPT | Performed by: PHYSICAL THERAPIST

## 2020-10-21 PROCEDURE — 99214 OFFICE O/P EST MOD 30 MIN: CPT | Performed by: FAMILY MEDICINE

## 2020-10-21 PROCEDURE — 73502 X-RAY EXAM HIP UNI 2-3 VIEWS: CPT

## 2020-10-21 PROCEDURE — 97110 THERAPEUTIC EXERCISES: CPT | Performed by: PHYSICAL THERAPIST

## 2020-10-21 PROCEDURE — 72200 X-RAY EXAM SI JOINTS: CPT

## 2020-10-21 RX ORDER — ESCITALOPRAM OXALATE 10 MG/1
15 TABLET ORAL DAILY
COMMUNITY
Start: 2020-10-06 | End: 2020-10-21 | Stop reason: ALTCHOICE

## 2020-10-22 ENCOUNTER — APPOINTMENT (OUTPATIENT)
Dept: PHYSICAL THERAPY | Facility: CLINIC | Age: 55
End: 2020-10-22
Payer: OTHER GOVERNMENT

## 2020-10-24 PROBLEM — G89.29 CHRONIC LEFT HIP PAIN: Status: ACTIVE | Noted: 2020-10-24

## 2020-10-24 PROBLEM — M25.552 CHRONIC LEFT HIP PAIN: Status: ACTIVE | Noted: 2020-10-24
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
## 2020-10-24 PROBLEM — H91.93 BILATERAL HEARING LOSS: Status: ACTIVE | Noted: 2020-10-24

## 2020-10-24 PROBLEM — M53.3 CHRONIC LEFT SACROILIAC PAIN: Status: ACTIVE | Noted: 2020-10-24

## 2020-10-24 PROBLEM — G89.29 CHRONIC LEFT SACROILIAC PAIN: Status: ACTIVE | Noted: 2020-10-24

## 2020-10-29 ENCOUNTER — OFFICE VISIT (OUTPATIENT)
Dept: PHYSICAL THERAPY | Facility: CLINIC | Age: 55
End: 2020-10-29
Payer: OTHER GOVERNMENT

## 2020-10-29 DIAGNOSIS — M25.552 LEFT HIP PAIN: Primary | ICD-10-CM

## 2020-10-29 DIAGNOSIS — M53.3 PAIN OF LEFT SACROILIAC JOINT: ICD-10-CM

## 2020-10-29 PROCEDURE — 97110 THERAPEUTIC EXERCISES: CPT

## 2020-10-29 PROCEDURE — 97140 MANUAL THERAPY 1/> REGIONS: CPT

## 2020-11-06 ENCOUNTER — OFFICE VISIT (OUTPATIENT)
Dept: PHYSICAL THERAPY | Facility: CLINIC | Age: 55
End: 2020-11-06
Payer: OTHER GOVERNMENT

## 2020-11-06 DIAGNOSIS — M72.2 PLANTAR FASCIITIS: ICD-10-CM

## 2020-11-06 DIAGNOSIS — S93.401D SPRAIN OF RIGHT ANKLE, UNSPECIFIED LIGAMENT, SUBSEQUENT ENCOUNTER: ICD-10-CM

## 2020-11-06 DIAGNOSIS — M53.3 PAIN OF LEFT SACROILIAC JOINT: ICD-10-CM

## 2020-11-06 DIAGNOSIS — M25.552 LEFT HIP PAIN: Primary | ICD-10-CM

## 2020-11-06 PROCEDURE — 97140 MANUAL THERAPY 1/> REGIONS: CPT

## 2020-11-06 PROCEDURE — 97110 THERAPEUTIC EXERCISES: CPT

## 2020-11-09 ENCOUNTER — TRANSCRIBE ORDERS (OUTPATIENT)
Dept: LAB | Facility: CLINIC | Age: 55
End: 2020-11-09

## 2020-11-09 ENCOUNTER — APPOINTMENT (OUTPATIENT)
Dept: LAB | Facility: CLINIC | Age: 55
End: 2020-11-09
Payer: OTHER GOVERNMENT

## 2020-11-09 ENCOUNTER — TRANSCRIBE ORDERS (OUTPATIENT)
Dept: ADMINISTRATIVE | Facility: HOSPITAL | Age: 55
End: 2020-11-09

## 2020-11-09 ENCOUNTER — APPOINTMENT (OUTPATIENT)
Dept: LAB | Facility: HOSPITAL | Age: 55
End: 2020-11-09
Payer: OTHER GOVERNMENT

## 2020-11-09 DIAGNOSIS — G60.3 IDIOPATHIC PROGRESSIVE POLYNEUROPATHY: Primary | ICD-10-CM

## 2020-11-09 DIAGNOSIS — R41.82 ALTERED MENTAL STATUS, UNSPECIFIED: ICD-10-CM

## 2020-11-09 DIAGNOSIS — G60.3 IDIOPATHIC PROGRESSIVE POLYNEUROPATHY: ICD-10-CM

## 2020-11-09 LAB
AMMONIA PLAS-SCNC: <10 UMOL/L (ref 11–35)
CRP SERPL QL: 0.5 MG/L

## 2020-11-09 PROCEDURE — 86140 C-REACTIVE PROTEIN: CPT

## 2020-11-09 PROCEDURE — 82140 ASSAY OF AMMONIA: CPT

## 2020-11-09 PROCEDURE — 36415 COLL VENOUS BLD VENIPUNCTURE: CPT

## 2020-11-17 ENCOUNTER — OFFICE VISIT (OUTPATIENT)
Dept: PHYSICAL THERAPY | Facility: CLINIC | Age: 55
End: 2020-11-17
Payer: OTHER GOVERNMENT

## 2020-11-17 DIAGNOSIS — M72.2 PLANTAR FASCIITIS: ICD-10-CM

## 2020-11-17 DIAGNOSIS — M53.3 PAIN OF LEFT SACROILIAC JOINT: ICD-10-CM

## 2020-11-17 DIAGNOSIS — S93.401D SPRAIN OF RIGHT ANKLE, UNSPECIFIED LIGAMENT, SUBSEQUENT ENCOUNTER: ICD-10-CM

## 2020-11-17 DIAGNOSIS — M25.552 LEFT HIP PAIN: Primary | ICD-10-CM

## 2020-11-17 PROCEDURE — 97110 THERAPEUTIC EXERCISES: CPT | Performed by: PHYSICAL THERAPIST

## 2020-11-18 ENCOUNTER — APPOINTMENT (OUTPATIENT)
Dept: PHYSICAL THERAPY | Facility: CLINIC | Age: 55
End: 2020-11-18
Payer: OTHER GOVERNMENT

## 2020-11-20 ENCOUNTER — HOSPITAL ENCOUNTER (OUTPATIENT)
Dept: MRI IMAGING | Facility: HOSPITAL | Age: 55
Discharge: HOME | End: 2020-11-20
Payer: OTHER GOVERNMENT

## 2020-11-20 DIAGNOSIS — R41.82 ALTERED MENTAL STATUS, UNSPECIFIED: ICD-10-CM

## 2020-11-20 PROCEDURE — G1004 CDSM NDSC: HCPCS

## 2020-11-20 PROCEDURE — 70553 MRI BRAIN STEM W/O & W/DYE: CPT

## 2020-11-20 PROCEDURE — A9585 GADOBUTROL INJECTION: HCPCS | Performed by: RADIOLOGY

## 2020-11-20 RX ADMIN — GADOBUTROL 6 ML: 604.72 INJECTION INTRAVENOUS at 08:17

## 2020-12-04 ENCOUNTER — OFFICE VISIT (OUTPATIENT)
Dept: PHYSICAL THERAPY | Facility: CLINIC | Age: 55
End: 2020-12-04
Payer: OTHER GOVERNMENT

## 2020-12-04 DIAGNOSIS — M53.3 PAIN OF LEFT SACROILIAC JOINT: ICD-10-CM

## 2020-12-04 DIAGNOSIS — M25.552 LEFT HIP PAIN: Primary | ICD-10-CM

## 2020-12-04 PROCEDURE — 97110 THERAPEUTIC EXERCISES: CPT

## 2020-12-07 ENCOUNTER — APPOINTMENT (OUTPATIENT)
Dept: LAB | Facility: HOSPITAL | Age: 55
End: 2020-12-07
Payer: OTHER GOVERNMENT

## 2020-12-07 DIAGNOSIS — G60.3 IDIOPATHIC PROGRESSIVE POLYNEUROPATHY: ICD-10-CM

## 2020-12-07 DIAGNOSIS — R41.82 ALTERED MENTAL STATUS, UNSPECIFIED: ICD-10-CM

## 2020-12-07 LAB — ERYTHROCYTE [SEDIMENTATION RATE] IN BLOOD: 1 MM/HOUR (ref 0–29)

## 2020-12-07 PROCEDURE — 85652 RBC SED RATE AUTOMATED: CPT

## 2020-12-07 PROCEDURE — 84165 PROTEIN E-PHORESIS SERUM: CPT

## 2020-12-07 PROCEDURE — 36415 COLL VENOUS BLD VENIPUNCTURE: CPT

## 2020-12-07 PROCEDURE — 86618 LYME DISEASE ANTIBODY: CPT

## 2020-12-07 PROCEDURE — 84165 PROTEIN E-PHORESIS SERUM: CPT | Performed by: PATHOLOGY

## 2020-12-08 LAB
ALBUMIN SERPL ELPH-MCNC: 4.31 G/DL (ref 3.5–5)
ALBUMIN SERPL ELPH-MCNC: 64.4 % (ref 52–65)
ALPHA1 GLOB SERPL ELPH-MCNC: 0.25 G/DL (ref 0.1–0.4)
ALPHA1 GLOB SERPL ELPH-MCNC: 3.7 % (ref 2.5–5)
ALPHA2 GLOB SERPL ELPH-MCNC: 0.62 G/DL (ref 0.4–1.2)
ALPHA2 GLOB SERPL ELPH-MCNC: 9.3 % (ref 7–13)
B BURGDOR IGG+IGM SER-ACNC: 42
BETA GLOB ABNORMAL SERPL ELPH-MCNC: 0.4 G/DL (ref 0.4–0.8)
BETA1 GLOB SERPL ELPH-MCNC: 6 % (ref 5–13)
BETA2 GLOB SERPL ELPH-MCNC: 4.5 % (ref 2–8)
BETA2+GAMMA GLOB SERPL ELPH-MCNC: 0.3 G/DL (ref 0.2–0.5)
GAMMA GLOB ABNORMAL SERPL ELPH-MCNC: 0.81 G/DL (ref 0.5–1.6)
GAMMA GLOB SERPL ELPH-MCNC: 12.1 % (ref 12–22)
IGG/ALB SER: 1.81 {RATIO} (ref 1.1–1.8)
PROT PATTERN SERPL ELPH-IMP: ABNORMAL
PROT SERPL-MCNC: 6.7 G/DL (ref 6.4–8.2)

## 2020-12-16 PROBLEM — H90.3 SENSORINEURAL HEARING LOSS (SNHL) OF BOTH EARS: Chronic | Status: ACTIVE | Noted: 2020-12-16

## 2021-04-15 ENCOUNTER — TRANSCRIBE ORDERS (OUTPATIENT)
Dept: ADMINISTRATIVE | Facility: HOSPITAL | Age: 56
End: 2021-04-15

## 2021-04-15 DIAGNOSIS — Z12.31 ENCOUNTER FOR SCREENING MAMMOGRAM FOR MALIGNANT NEOPLASM OF BREAST: Primary | ICD-10-CM

## 2021-04-29 ENCOUNTER — HOSPITAL ENCOUNTER (OUTPATIENT)
Dept: MAMMOGRAPHY | Facility: CLINIC | Age: 56
Discharge: HOME | End: 2021-04-29
Payer: OTHER GOVERNMENT

## 2021-04-29 VITALS — HEIGHT: 71 IN | WEIGHT: 143 LBS | BODY MASS INDEX: 20.02 KG/M2

## 2021-04-29 DIAGNOSIS — Z12.31 ENCOUNTER FOR SCREENING MAMMOGRAM FOR MALIGNANT NEOPLASM OF BREAST: ICD-10-CM

## 2021-04-29 PROCEDURE — 77067 SCR MAMMO BI INCL CAD: CPT

## 2021-04-29 PROCEDURE — 77063 BREAST TOMOSYNTHESIS BI: CPT

## 2021-08-09 ENCOUNTER — EVALUATION (OUTPATIENT)
Dept: OCCUPATIONAL THERAPY | Facility: REHABILITATION | Age: 56
End: 2021-08-09
Payer: OTHER GOVERNMENT

## 2021-08-09 DIAGNOSIS — R41.3 MEMORY LOSS: Primary | ICD-10-CM

## 2021-08-09 PROCEDURE — 97166 OT EVAL MOD COMPLEX 45 MIN: CPT | Performed by: OCCUPATIONAL THERAPIST

## 2021-08-09 PROCEDURE — 96125 COGNITIVE TEST BY HC PRO: CPT | Performed by: OCCUPATIONAL THERAPIST

## 2021-08-09 NOTE — PROGRESS NOTES
OCCUPATIONAL THERAPY INITIAL EVALUATION:      08/09/2021  Juan Manuel Vogt  1965  [id number]  Enloe Laughter, DO   Diagnosis ICD-10-CM Associated Orders   1  Memory loss  R41 3          Assessment  Assessment details: See skilled analysis for further details  SKILLED ANALYSIS:  Pt is a 61 y o  female referred to Occupational Therapy s/p Memory loss [R41 3]  Pt participated in skilled OT evaluation and following formalized testing as well as clinical observation, Pt presents with the following areas of deficit:  Decreased immediate and delayed memory retention/recall, frequent bouts of forgetfulness of detailed information, decreased sustained/alternating/divided attention in multi-modal environment with frequent distractions present to environment stimuli, delayed processing speed, increased anxiety, decreased speed of reading abilities, and agraphia affecting abilities returning to worker role and engagement in salient/leisure tasks  Pt scoring a total of extremely low on RBANS standardized assessment with biggest difficulties in immediate/delayed memory, attention, and visuospatial/constructional subsections  Pt will benefit from skilled Occupational Therapy services 1x/week for 8-10 weeks with focus on cog re-training, Pt edu on internal/external memory aides, and reading/handwriting improving speed and abilities to engage in handwriting tasks to improve on the above deficits, eventual return to worker role, and enhance overall QOL  Javi Garduno GOALS:    Short Term:    o Pt will increase verbal and written 1-2 step direction following with processing time of <30 seconds and 75% accuracy for improved functional performance with salient tasks 4 weeks    o Pt will diya CASTRO carryover and understanding of temporal orientation compensatory strategies and orientation of daily schedules (ie   Use of calendars, alarms, etc) for inc safety with life roles and IADL fxn 4 weeks    o Pt will diya G recall of 65% of Verbal/written information utilizing memory strategy of choice for improved STM/delayed memory 4 weeks    o Pt will demo G carryover of use of internal/external memory strategy aides for improved recall of daily events, improved executive functioning with 65% accuracy 4 weeks    o Pt will retain/comprehend 65% of verbal and/or written information as provided to inc overall life role performance and for return to leisure task of reading 4 weeks      o Pt will maintain attention to task for 45 minutes in multimodal environment for baseline performance,  improved role performance and to improve learning and to simulate return to work environment 4 weeks    o Pt will demo ability to participate in dual tasking/divided attention task with 65% accuracy in multimodal environment to simulate return to work roles 4 weeks    o Pt will demo ability to alternate attention between 2 tasks with cog loading and 65% accuracy with G retention of task directions in multimodal environment to simulate return to work  roles 4 weeks  o Pt will demo with improved accuracy with writing x 2 sentences with <5 errors for eventual return to salient tasks of writing letters/notes 4 weeks        Long - Term:   o Pt will increase verbal and written 1-2 step direction following with processing time of <15 seconds and 85% accuracy for improved functional performance with salient tasks     o Pt will demo G recall of 85% of Verbal/written information utilizing memory strategy of choice for improved STM/delayed memory    o Pt will demo G carryover of use of internal/external memory strategy aides for improved recall of daily events, improved executive functioning with 85% accuracy     o Pt will retain/comprehend 85% of verbal and/or written information as provided to inc overall life role performance and for return to leisure task of reading       o Pt will maintain attention to task for 60 minutes in multimodal environment for baseline performance,  improved role performance and to improve learning and to simulate return to work environment     o Pt will demo ability to participate in dual tasking/divided attention task with 85% accuracy in multimodal environment to simulate return to work roles    o Pt will demo ability to alternate attention between 2 tasks with cog loading and 85% accuracy with G retention of task directions in multimodal environment to simulate return to work  roles   o Pt will demo with improved accuracy with writing x 4 sentence paragraph with <2 errors for eventual return to salient tasks of writing letters/notes        Subjective Evaluation    Quality of life: fair          SUBJECTIVE: "The main thing I am having trouble with focusing and my memory "    PATIENT GOAL: "To be able to work again and write again "      HISTORY OF PRESENT ILLNESS:     Pt is a 61 y o  female who was referred to Occupational Therapy s/p  Memory loss [R41 3]  Pt reports having mechanical fall down the stairs in January 2020, though reports not hitting head  Pt reports no fractures, though fall resulted in a R ankle sprain  Pt with self-report of symptoms beginning approximately one year ago with initial symptoms of decreased memory retention/recall of numbers when performing working duties  Pt reports noticing mixing up "B's" and "D's" when writing beginning in December  Pt now reports ongoing decline in handwriting and spelling  Pt underwent brain MRI last Fall with results WNL  Pt has an appt scheduled with Neurology for the [facility] for further assessment 2* unknown reason for the cause of present symptoms/deficits  Pt is currently seeing psychology every two weeks 2* anxiety  Pt with self-report of noticing worsening symptoms of decreased sustained/alteranting/divided attention, decreased memory retention/recall, agraphia, and frequent forgetfulness  Pt lives in a two story home with     Pt currently performing all ADLs and IADLs at independent status  Pt currently not working at this time since last October-- Pt worked as an  for [employer]  Pt continues the  role with no difficulties reported  PMH:   Past Medical History:   Diagnosis Date    Abnormal Pap smear of cervix     Anemia     Atypical nevus of scalp     last assessed 17     Basal cell carcinoma of scalp     last assessed 17     Breast lump     last assessed 14     Cancer (HCC)     BCC-left nose    Cervical polyp     Fibroid     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    Leukopenia     Menorrhagia     last assessed 14     Metrorrhagia     last assessed 10/22/15     Ovarian cyst     left    Urinary tract infection        Past Surgical Hx:   Past Surgical History:   Procedure Laterality Date    BREAST BIOPSY Right 2011    percutan needle core use imag guide stereotactic / benign    BREAST SURGERY Right     bx    COLONOSCOPY      SD EXC SKIN BENIG >4 CM REMAINDR BODY N/A 2017    Procedure: SCALP SUSPICIOUS LESION EXCISION;  Surgeon: Julieta Agosto MD;  [facility];  Service: Plastics    SD RECMPL WND SCALP,EXTR 2 6-7 5 CM N/A 2017    Procedure: COMPLEX CLOSURE;  Surgeon: Julieta Agosto MD;  [facility];  Service: Plastics    SKIN BIOPSY      basal cell ca    SKIN CANCER EXCISION      nose          Pain Levels:      Restin    With Activity:  0        Objective     Functional Assessment        Comments  See impairment section for further details  IMPAIRMENTS SECTION:    Assessments  The Repeatable Battery for the Assessment of Neuropsychological Status (RBANS) is a brief, individually-administered assessment which measures attention, language, visuospatial/constructional abilities, and immediate & delayed memory  The RBANS is intended for use with adolescents to adults, ages 15 to 80 years   The following results were obtained during the administration of the assessment  Form: C    Cognitive Domain/Subtest: Index Score: Percentile Rank: Classification: IE: Status:   IMMEDIATE MEMORY 78 7%ile Borderline          1  List Learning (27/40)          2  Story Memory (5/24)           VISUOSPATIAL/  CONSTRUCTIONAL 78 7%ile Borderline           3  Figure Copy (15/20)          4  Line Orientation (15/20)           LANGUAGE 82 12%ile Low Average          5  Picture Naming (9/10)          6  Semantic Fluency (12/40)           ATTENTION 75 5%ile Borderline          7  Digit Span (10/16)          8  Coding (16/89)           DELAYED MEMORY 64 1%ile Extremely Low          9  List Recall (5/10)          10  List Recognition (17/20)          11  Story Recall (2/12)          12  Figure Recall (6/20)           Sum of Index Scores:  377      Total Score:  69      Percentile: 2%ile      Classification: Extremely Low          TIME:   [id number] OT Eval  RBANS >91 minutes for administration, scoring,  interpretation, documentation, and POC development        PLANNED THERAPY INTERVENTIONS:  Internal and external memory aides  Multimatrix for saccades/ visual clutter/attention  Multi-modal environment  Sustained/alternating/divided attention  Work stations with timed transitions  Temporal Awareness: Organize the Hour activities  Memory and mental manipulation  Auditory processing with immediate recall  Memory retention with immediate and delayed recall  Spelling/reading trials    INTERVENTION COMMENTS:  Diagnosis: Memory loss [R41 3]  Precautions: Anxiety  FOTO: 73, with 46% limitation in Memory  Insurance: Payor: Walter Profit / Plan: [employer] / Product Type: Govt Unknown Not Listed /   1 of ______ visits, PN due 09/09/2021

## 2021-08-09 NOTE — LETTER
August 9, 2021    Kristin José, [address] [facility] [address]  [address] [address]    Patient: Jo Ann Lr   YOB: 1965   Date of Visit: 8/9/2021     Encounter Diagnosis     ICD-10-CM    1  Memory loss  R41 3        Dear Dr Randell Cifuentes: Thank you for your recent referral of Jo Ann Lr  Please review the attached evaluation summary from Kita's recent visit  Please verify that you agree with the plan of care by signing the attached order  If you have any questions or concerns, please do not hesitate to call  I sincerely appreciate the opportunity to share in the care of one of your patients and hope to have another opportunity to work with you in the near future  Sincerely,    Kelly Garcia, OT      Referring Provider:     I certify that I have read the below Plan of Care and certify the need for these services furnished under this plan of treatment while under my care  Kristin José   [address]  [place] [address] [place] [id number]  Via Fax: [phone]         OCCUPATIONAL THERAPY INITIAL EVALUATION:      08/09/2021  Jo Ann Lr  1965  [id number]  Dre Curtis DO   Diagnosis ICD-10-CM Associated Orders   1  Memory loss  R41 3          Assessment  Assessment details: See skilled analysis for further details  SKILLED ANALYSIS:  Pt is a 61 y o  female referred to Occupational Therapy s/p Memory loss [R41 3]    Pt participated in skilled OT evaluation and following formalized testing as well as clinical observation, Pt presents with the following areas of deficit:  Decreased immediate and delayed memory retention/recall, frequent bouts of forgetfulness of detailed information, decreased sustained/alteranting/divided attention in multi-modal environment with frequent distractions present to environment stimuli, delayed processing speed, increased anxiety, decreased speed of reading abilities, and agraphia affecting abilities returning to worker role and engagement in salient/leisure tasks  Pt scoring a total of extremely low on RBANS standardized assessment with biggest difficulties in immediate/delayed memory, attention, and visuospatial/constructional subsections  Pt will benefit from skilled Occupational Therapy services 1x/week for 8-10 weeks with focus on cog re-training, Pt edu on internal/external memory aides, and reading/handwriting improving speed and abilities to engage in handwriting tasks to improve on the above deficits, eventual return to worker role, and enhance overall QOL  Rinku Acevedo GOALS:    Short Term:    o Pt will increase verbal and written 1-2 step direction following with processing time of <30 seconds and 75% accuracy for improved functional performance with salient tasks 4 weeks    o Pt will demo G carryover and understanding of temporal orientation compensatory strategies and orientation of daily schedules (ie   Use of calendars, alarms, etc) for inc safety with life roles and IADL fxn 4 weeks    o Pt will demo G recall of 65% of Verbal/written information utilizing memory strategy of choice for improved STM/delayed memory 4 weeks    o Pt will demo G carryover of use of internal/external memory strategy aides for improved recall of daily events, improved executive functioning with 65% accuracy 4 weeks    o Pt will retain/comprehend 65% of verbal and/or written information as provided to inc overall life role performance and for return to leisure task of reading 4 weeks      o Pt will maintain attention to task for 45 minutes in multimodal environment for baseline performance,  improved role performance and to improve learning and to simulate return to work environment 4 weeks    o Pt will demo ability to participate in dual tasking/divided attention task with 65% accuracy in multimodal environment to simulate return to work roles 4 weeks    o Pt will demo ability to alternate attention between 2 tasks with cog loading and 65% accuracy with G retention of task directions in multimodal environment to simulate return to work  roles 4 weeks  o Pt will demo with improved accuracy with writing x 2 sentences with <5 errors for eventual return to salient tasks of writing letters/notes 4 weeks        Long - Term:   o Pt will increase verbal and written 1-2 step direction following with processing time of <15 seconds and 85% accuracy for improved functional performance with salient tasks     o Pt will demo G recall of 85% of Verbal/written information utilizing memory strategy of choice for improved STM/delayed memory    o Pt will demo G carryover of use of internal/external memory strategy aides for improved recall of daily events, improved executive functioning with 85% accuracy     o Pt will retain/comprehend 85% of verbal and/or written information as provided to inc overall life role performance and for return to leisure task of reading       o Pt will maintain attention to task for 60 minutes in multimodal environment for baseline performance,  improved role performance and to improve learning and to simulate return to work environment     o Pt will demo ability to participate in dual tasking/divided attention task with 85% accuracy in multimodal environment to simulate return to work roles    o Pt will demo ability to alternate attention between 2 tasks with cog loading and 85% accuracy with G retention of task directions in multimodal environment to simulate return to work  roles   o Pt will demo with improved accuracy with writing x 4 sentence paragraph with <2 errors for eventual return to salient tasks of writing letters/notes        Subjective Evaluation    Quality of life: fair          SUBJECTIVE: "The main thing I am having trouble with focusing and my memory "    PATIENT GOAL: "To be able to work again and write again "      HISTORY OF PRESENT ILLNESS:     Pt is a 61 y o  female who was referred to Occupational Therapy s/p  Memory loss [R41 3]  Pt reports having mechanical fall down the stairs in January 2020, though reports not hitting head  Pt reports no fractures, though fall resulted in a R ankle sprain  Pt with self-report of symptoms beginning approximately one year ago with initial symptoms of decreased memory retention/recall of numbers when performing working duties  Pt reports noticing mixing up "B's" and "D's" when writing beginning in December  Pt now reports ongoing decline in handwriting and spelling  Pt underwent brain MRI last Fall with results WNL  Pt has an appt scheduled with Neurology for the [place] for further assessment 2* unknown reason for the cause of present symptoms/deficits  Pt is currently seeing psychology every two weeks 2* anxiety  Pt with self-report of noticing worsening symptoms of decreased sustained/alteranting/divided attention, decreased memory retention/recall, agraphia, and frequent forgetfulness  Pt lives in a two story home with   Pt currently performing all ADLs and IADLs at independent status  Pt currently not working at this time since last October-- Pt worked as an  for [employer]  Pt continues the  role with no difficulties reported       PMH:   Past Medical History:   Diagnosis Date    Abnormal Pap smear of cervix     Anemia     Atypical nevus of scalp     last assessed 5/11/17     Basal cell carcinoma of scalp     last assessed 5/11/17     Breast lump     last assessed 5/12/14     Cancer (HCC)     BCC-left nose    Cervical polyp     Fibroid     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    Leukopenia     Menorrhagia     last assessed 5/12/14     Metrorrhagia     last assessed 10/22/15     Ovarian cyst     left    Urinary tract infection        Past Surgical Hx:   Past Surgical History:   Procedure Laterality Date    BREAST BIOPSY Right     percutan needle core use imag guide stereotactic / benign    BREAST SURGERY Right     bx    COLONOSCOPY      TX EXC SKIN BENIG >4 CM REMAINDR BODY N/A 2017    Procedure: SCALP SUSPICIOUS LESION EXCISION;  Surgeon: Sheba Ashton MD;  [facility];  Service: Plastics    TX RECMPL WND SCALP,EXTR 2 6-7 5 CM N/A 2017    Procedure: COMPLEX CLOSURE;  Surgeon: Sheba Ashton MD;  [facility];  Service: Plastics    SKIN BIOPSY      basal cell ca    SKIN CANCER EXCISION      nose          Pain Levels:      Restin    With Activity:  0        Objective     Functional Assessment        Comments  See impairment section for further details  IMPAIRMENTS SECTION:    Assessments  The Repeatable Battery for the Assessment of Neuropsychological Status (RBANS) is a brief, individually-administered assessment which measures attention, language, visuospatial/constructional abilities, and immediate & delayed memory  The RBANS is intended for use with adolescents to adults, ages 15 to 80 years  The following results were obtained during the administration of the assessment  Form: C    Cognitive Domain/Subtest: Index Score: Percentile Rank: Classification: IE: Status:   IMMEDIATE MEMORY 78 7%ile Borderline          1  List Learning ()          2  Story Memory ()           VISUOSPATIAL/  CONSTRUCTIONAL 78 7%ile Borderline           3  Figure Copy (15/20)          4  Line Orientation (15/20)           LANGUAGE 82 12%ile Low Average          5  Picture Naming (9/10)          6  Semantic Fluency ()           ATTENTION 75 5%ile Borderline          7  Digit Span (10/16)          8  Coding ()           DELAYED MEMORY 64 1%ile Extremely Low          9  List Recall (5/10)          10  List Recognition ()          11  Story Recall ()          12   Figure Recall ()           Sum of Index Scores:  377      Total Score:  69      Percentile: 2%ile      Classification: Extremely Low          TIME:   0402-9403 OT Eval  RBANS >91 minutes for administration, scoring,  interpretation, documentation, and POC development        PLANNED THERAPY INTERVENTIONS:  Internal and external memory aides  Multimatrix for saccades/ visual clutter/attention  Multi-modal environment  Sustained/alternating/divided attention  Work stations with timed transitions  Temporal Awareness: Organize the Hour activities  Memory and mental manipulation  Auditory processing with immediate recall  Memory retention with immediate and delayed recall  Spelling/reading trials    INTERVENTION COMMENTS:  Diagnosis: Memory loss [R41 3]  Precautions: Anxiety  FOTO: 73, with 46% limitation in Memory  Insurance: Payor: Marylee Amsterdam / Plan: Marylee Amsterdam / Product Type: Govt Unknown Not Listed /   1 of ______ visits, PN due 09/09/2021

## 2021-08-17 ENCOUNTER — TELEPHONE (OUTPATIENT)
Dept: OBGYN CLINIC | Facility: HOSPITAL | Age: 56
End: 2021-08-17

## 2021-08-17 ENCOUNTER — OFFICE VISIT (OUTPATIENT)
Dept: OCCUPATIONAL THERAPY | Facility: REHABILITATION | Age: 56
End: 2021-08-17
Payer: OTHER GOVERNMENT

## 2021-08-17 DIAGNOSIS — R41.3 MEMORY LOSS: Primary | ICD-10-CM

## 2021-08-17 PROCEDURE — 97530 THERAPEUTIC ACTIVITIES: CPT

## 2021-08-17 NOTE — PROGRESS NOTES
Occupational Therapy Daily Note:    Today's date: 2021  Patient name: Jo Ann Lr  : 1965  MRN: [id number]  Referring provider: Dre Curtis, DO  Dx:   Encounter Diagnosis   Name Primary?  Memory loss Yes                  Subjective: "You still don't know what happened  Jessica Cutting or what caused my problem " Pt reports driving to wrong [facility] today for OT session  Objective: Pt seen for OT treatment session focusing on memory and attention strategies  OTR educated pt on POC/goals, previous testing results with fair understanding  Educated pt on use of internal and external memory strategies, types of attention and attention strategies in order to increase occupational performance for ADLs, IADLs, salient, leisure tasks  Pt provided with visual picture and picture of 12 items with demands of immediate recall after studying for 1 min utilizing internal memory strategies  4    Assessment: Tolerated treatment fair  Pt initially perseverative on the cause or diagnosis of what is happening neurologically  OTR explained MRI results that were previously done  Pt demo fair understanding of memory strategies  Pt reports using lists would not be helpful d/t having "80" things to do in a day, although then reports that they are being used at home  Pt reports using an alarm or timer would be beneficial for daily life tasks  Pt demo G understanding of increasing attention with strategies such as taking breaks, talking to herself and saying things out loud, and gaining control of knowing when she is having a difficult time understanding tasks  Pt able to recall 4 parts of the visual picture, having increased difficulty in utilizing internal memory strategy  Pt reports trying to associate the ship with a funny picture/visualization  Pt able to recall 12/12 pictures utilizing chunking strategy  Patient would benefit from continued skilled OT  Plan: Continued skilled OT per POC  Instructed pt to utilize an alarm/timer for increasing attention during cooking, reading/leisure tasks  To f/u next session      INTERVENTION COMMENTS:  Diagnosis: Memory loss [R41 3]  Precautions: Anxiety  FOTO: 73, with 46% limitation in Memory  Insurance: Payor: Elver Dickinson / Plan: Elver Dickinson / Product Type: Govt Unknown Not Listed /   2 of 10 visits, PN due 09/09/2021

## 2021-08-17 NOTE — TELEPHONE ENCOUNTER
Dr Iraida Lazo    [phone]    Patient is requesting a work letter stating how many hours she can stand for without a break  Please advise  Patient will  in the office

## 2021-08-18 NOTE — TELEPHONE ENCOUNTER
Per Dr Krystal Anthony the patient needs an appt to be evaluated before any notes for work can be given

## 2021-08-24 ENCOUNTER — OFFICE VISIT (OUTPATIENT)
Dept: OCCUPATIONAL THERAPY | Facility: REHABILITATION | Age: 56
End: 2021-08-24
Payer: OTHER GOVERNMENT

## 2021-08-24 DIAGNOSIS — R41.3 MEMORY LOSS: Primary | ICD-10-CM

## 2021-08-24 PROCEDURE — 97530 THERAPEUTIC ACTIVITIES: CPT | Performed by: OCCUPATIONAL THERAPIST

## 2021-08-24 NOTE — PROGRESS NOTES
Daily Note     Today's date: 2021  Patient name: Westly Moritz  : 1965  MRN: [id number]  Referring provider: Maricel Lazaro DO  Dx:   Encounter Diagnosis     ICD-10-CM    1  Memory loss  R41 3                   Subjective: "I can't work if I do things slowly "      Objective: See treatment description below    OTR presented Pt with auditory short story with demands of recall at end of treatment session (55 minute time delay) focusing on improved delayed memory recall and utilization of internal memory aides  Pt engaged in memory and mental manipulation function task of recalling 4 word auditory sequences and placing in an appropriate sentence, followed by writing correct sentence on paper focusing on improved immediate memory recall, improved handwriting fluidity/legibility, and improved sustained attention in multi-modal environment  Assessment: Tolerated treatment well  Patient would benefit from continued OT     Pt demo with poor carryover of internal/external memory aides educated on in home environment with poor desire to trial new strategies at this time  Pt continues to be perseverative on current limitations and letter reversal with handwriting demands affecting motivation and willingness to utilize strategies and massed practice to assist  Pt also continues to question the reason of her current deficits and recent MRI with frequent occasions of becoming emotionally labile and frustrated  Pt demo with abilities to recall and place 18/20 4-word sequences in appropriate sentences without verbal cues or repeat in auditory sequences needed  Pt with initial frequent pauses when writing sentences on paper, though less frequent pauses present with massed practice and as activity persisted  Pt demo with abilities to recall 95% of auditory information presented in beginning of tx session with utilization of repeating strategies  Plan: Continue per plan of care  Alternating/divided attention      INTERVENTION COMMENTS:  Diagnosis: Memory loss [R41 3]  Precautions: Anxiety  FOTO: 73, with 46% limitation in Memory  Insurance: Payor: Danyell Stacy / Plan: Danyell Stacy / Product Type: Govt Unknown Not Listed /   3 of 10 visits, PN due 09/09/2021

## 2021-08-31 ENCOUNTER — OFFICE VISIT (OUTPATIENT)
Dept: OCCUPATIONAL THERAPY | Facility: REHABILITATION | Age: 56
End: 2021-08-31
Payer: OTHER GOVERNMENT

## 2021-08-31 DIAGNOSIS — R41.3 MEMORY LOSS: Primary | ICD-10-CM

## 2021-08-31 PROCEDURE — 97530 THERAPEUTIC ACTIVITIES: CPT

## 2021-08-31 NOTE — PROGRESS NOTES
Occupational Therapy Daily Note:    Today's date: 2021  Patient name: Vickie Cuevas  : 1965  MRN: [id number]  Referring provider: Sebas Vernon, DO  Dx:   Encounter Diagnosis   Name Primary?  Memory loss Yes                  Subjective: "I don't understand the purpose of some of these activities though "     Objective: Pt reports not remembering what she completed over the weekend although upon prompted specific questions about timing of day, pt able to report several parts about Saturday/  Pt reports "as I talked I was able to think of more things I did do "   Pt also reports using more post-it notes than she used too for small events / appointments as a reminder to take items with her for appointments  Pt also reports repeating items more frequently to help her with short term tasks  Pt engaged in 4 work stations to address sustained attention, alternating attention, divided attention, immediate memory, delayed memory recall, 2 part direction following, problem solving functional descriptions to improve performance for leisure, ADL, IADL, salient tasks  Tasks included:    1 ) Pt required to utilize internal memory strategy for memory retention of 3-4 part paragraph with recall after 20-25 min delay   2 ) Problem solving 20 questions with 4 part answers while writing on paper or typing on laptop  3 ) Drawing worksheet with 2 part directions   4 ) Dividing attention between naming boys/girls names or states/countries using alphabet     Assessment: Tolerated treatment well  Pt engaged in all tasks throughout session while in a multi-modal sensory environment  Pt able to recall 20% of paragraph retention x1 and 40% of paragraph on 2nd trial with delay  Pt reports visualizing it as internal memory strategy to remember the paragraphs provided    Pt required 20% of problem solving descriptions repeated, OTR prompted pt to utilize b/l UE's during typing on computer d/t pt using "find and wilder" method with good carryover afterwards with slight delayed timing to find certain keys  When handwriting answers, pt demo F speed with no errors noted between B's and D's  Pt required increased time when writing out the full date of today  Pt required 30% of repeat of 2 part directions activity while standing on airex for balance challenge  During last task, pt required occasional verbal cues for place in alphabet during activity d/t attention deficit  Patient would benefit from continued skilled OT  Plan: Continued skilled OT per POC  Continue with increased challenge of 2-3 part directions, delayed memory, carryover of internal memory strategies during session         INTERVENTION COMMENTS:  Diagnosis: Memory loss [R41 3]  Precautions: Anxiety  FOTO: 73, with 46% limitation in Memory  Insurance: Payor: SANDRA / Plan: Geraldo Marx / Product Type: Govt Unknown Not Listed /   4 of 10 visits, PN due 09/09/2021

## 2021-09-07 ENCOUNTER — EVALUATION (OUTPATIENT)
Dept: OCCUPATIONAL THERAPY | Facility: REHABILITATION | Age: 56
End: 2021-09-07
Payer: OTHER GOVERNMENT

## 2021-09-07 DIAGNOSIS — R41.3 MEMORY LOSS: Primary | ICD-10-CM

## 2021-09-07 PROCEDURE — 97168 OT RE-EVAL EST PLAN CARE: CPT | Performed by: OCCUPATIONAL THERAPIST

## 2021-09-07 PROCEDURE — 96125 COGNITIVE TEST BY HC PRO: CPT | Performed by: OCCUPATIONAL THERAPIST

## 2021-09-07 NOTE — PROGRESS NOTES
OCCUPATIONAL THERAPY INITIAL EVALUATION:      09/07/2021  Jen Lira  1965  [id number]  Bianca Holliday DO   Diagnosis ICD-10-CM Associated Orders   1  Memory loss  R41 3          Assessment  Assessment details: See skilled analysis for further details  SKILLED ANALYSIS:  Pt is a 61 y o  female referred to Occupational Therapy s/p Memory loss [R41 3]  Pt participated in skilled OT evaluation and following formalized testing as well as clinical observation, Pt presents with the following areas of deficit:  Decreased immediate and delayed memory retention/recall, frequent bouts of forgetfulness of detailed information, decreased sustained/alternating/divided attention in multi-modal environment with frequent distractions present to environment stimuli, delayed processing speed, increased anxiety, decreased speed of reading abilities, and agraphia affecting abilities returning to worker role and engagement in salient/leisure tasks  Pt scoring a total of extremely low on RBANS standardized assessment with biggest difficulties in immediate/delayed memory, attention, and visuospatial/constructional subsections  Pt will benefit from skilled Occupational Therapy services 1x/week for 8-10 weeks with focus on cog re-training, Pt edu on internal/external memory aides, and reading/handwriting improving speed and abilities to engage in handwriting tasks to improve on the above deficits, eventual return to worker role, and enhance overall QOL  Leander Pelletier Upon this date, Pt participated in re-evaluation to further assess fxnl progression towards Occupational Therapy goals   Pt demo with G functional progression towards goals in POC evident by improved immediate/delayed memory retention/recall with emerging improved utilization of internal/external memory aides, improved direction follow abilities for both written and verbal with less repeat in information required, improved sustained attention to task at hand in multi-modal environment with less distractions present, and improved fluidity/legibility of handwriting with less errors evident  Following formalized testing and clinical observation, Pt continues to present with the following limitation: delayed memory recall with increased anxiety levels, decreased speed of handwriting when taking notes for verbal information, increased anxiety levels regarding current limitations, and decreased dual tasking/alternating/divided attention with frequent verbal cues or prompts required  OTR recommending Pt to continue skilled OT services 1x/week for 4-6 weeks focusing on improving the above deficits, improved role performance in home environment, and eventual return to worker role functions  GOALS:    Short Term:    Pt will increase verbal and written 1-2 step direction following with processing time of <30 seconds and 75% accuracy for improved functional performance with salient tasks 4 weeks-- MET    Pt will demo G carryover and understanding of temporal orientation compensatory strategies and orientation of daily schedules (ie   Use of calendars, alarms, etc) for inc safety with life roles and IADL fxn 4 weeks-- PARTIALLY MET    Pt will demo G recall of 65% of Verbal/written information utilizing memory strategy of choice for improved STM/delayed memory 4 weeks-- PARTIALLY MET    Pt will demo G carryover of use of internal/external memory strategy aides for improved recall of daily events, improved executive functioning with 65% accuracy 4 weeks-- PARTIALLY MET    Pt will retain/comprehend 65% of verbal and/or written information as provided to inc overall life role performance and for return to leisure task of reading 4 weeks-- MET      Pt will maintain attention to task for 45 minutes in multimodal environment for baseline performance,  improved role performance and to improve learning and to simulate return to work environment 4 weeks-- MET    Pt will demo ability to participate in dual tasking/divided attention task with 65% accuracy in multimodal environment to simulate return to work roles 4 weeks-- MET    Pt will demo ability to alternate attention between 2 tasks with cog loading and 65% accuracy with G retention of task directions in multimodal environment to simulate return to work  roles 4 weeks-- MET  Pt will demo with improved accuracy with writing x 2 sentences with <5 errors for eventual return to salient tasks of writing letters/notes 4 weeks-- PARTIALLY MET        Long - Term:   Pt will increase verbal and written 1-2 step direction following with processing time of <15 seconds and 85% accuracy for improved functional performance with salient tasks-- PARTIALLY MET    Pt will demo G recall of 85% of Verbal/written information utilizing memory strategy of choice for improved STM/delayed memory-- NOT MET    Pt will demo G carryover of use of internal/external memory strategy aides for improved recall of daily events, improved executive functioning with 85% accuracy-- NOT MET    Pt will retain/comprehend 85% of verbal and/or written information as provided to inc overall life role performance and for return to leisure task of reading-- PARTIALLY MET       Pt will maintain attention to task for 60 minutes in multimodal environment for baseline performance,  improved role performance and to improve learning and to simulate return to work environment -- PARTIALLY MET    Pt will demo ability to participate in dual tasking/divided attention task with 85% accuracy in multimodal environment to simulate return to work roles-- PARTIALLY MET    Pt will demo ability to alternate attention between 2 tasks with cog loading and 85% accuracy with G retention of task directions in multimodal environment to simulate return to work  roles -- PARTIALLY MET  Pt will demo with improved accuracy with writing x 4 sentence paragraph with <2 errors for eventual return to salient tasks of writing letters/notes-- NOT MET        Subjective Evaluation    Quality of life: fair          SUBJECTIVE: "No change  This past week hs been more stressful  I am using post its more than I used to "    PATIENT GOAL: "To be able to work again and write again "      HISTORY OF PRESENT ILLNESS:     Pt is a 61 y o  female who was referred to Occupational Therapy s/p  Memory loss [R41 3]  Pt reports having mechanical fall down the stairs in January 2020, though reports not hitting head  Pt reports no fractures, though fall resulted in a R ankle sprain  Pt with self-report of symptoms beginning approximately one year ago with initial symptoms of decreased memory retention/recall of numbers when performing working duties  Pt reports noticing mixing up "B's" and "D's" when writing beginning in December  Pt now reports ongoing decline in handwriting and spelling  Pt underwent brain MRI last Fall with results WNL  Pt has an appt scheduled with Neurology for the [place] for further assessment 2* unknown reason for the cause of present symptoms/deficits  Pt is currently seeing psychology every two weeks 2* anxiety  Pt with self-report of noticing worsening symptoms of decreased sustained/alteranting/divided attention, decreased memory retention/recall, agraphia, and frequent forgetfulness  Pt lives in a two story home with   Pt currently performing all ADLs and IADLs at independent status  Pt currently not working at this time since last October-- Pt worked as an  for [employer]  Pt continues the  role with no difficulties reported       PMH:   Past Medical History:   Diagnosis Date    Abnormal Pap smear of cervix     Anemia     Atypical nevus of scalp     last assessed 5/11/17     Basal cell carcinoma of scalp     last assessed 5/11/17     Breast lump     last assessed 5/12/14     Cancer (HCC)     BCC-left nose    Cervical polyp     Fibroid     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    Leukopenia     Menorrhagia     last assessed 14     Metrorrhagia     last assessed 10/22/15     Ovarian cyst     left    Urinary tract infection        Past Surgical Hx:   Past Surgical History:   Procedure Laterality Date    BREAST BIOPSY Right 2011    percutan needle core use imag guide stereotactic / benign    BREAST SURGERY Right     bx    COLONOSCOPY      AK EXC SKIN BENIG >4 CM REMAINDR BODY N/A 2017    Procedure: SCALP SUSPICIOUS LESION EXCISION;  Surgeon: Chava Carmona MD;  Location: [facility];  Service: Plastics    AK RECMPL WND SCALP,EXTR 2 6-7 5 CM N/A 2017    Procedure: COMPLEX CLOSURE;  Surgeon: Chava Carmona MD;  Location: [facility];  Service: Plastics    SKIN BIOPSY      basal cell ca    SKIN CANCER EXCISION      nose          Pain Levels:      Restin    With Activity:  3    Pain in R foot 2* Plantar fasciatus  Objective     Functional Assessment        Comments  See impairment section for further details  IMPAIRMENTS SECTION:    Assessments  The Repeatable Battery for the Assessment of Neuropsychological Status (RBANS) is a brief, individually-administered assessment which measures attention, language, visuospatial/constructional abilities, and immediate & delayed memory  The RBANS is intended for use with adolescents to adults, ages 15 to 80 years  The following results were obtained during the administration of the assessment  Form: D    Cognitive Domain/Subtest: Index Score: Percentile Rank: Classification: IE: Status:   IMMEDIATE MEMORY 94 34%ile Average Borderline Significant improvement        1  List Learning ()          2  Story Memory ()           VISUOSPATIAL/  CONSTRUCTIONAL 75 5%ile Borderline  Borderline Remains        3  Figure Copy (15/20)          4  Line Orientation ()           LANGUAGE 90 25%ile Low Average Low Average Slight improvement        5  Picture Naming (10/10)          6   Semantic Fluency (23/40)           ATTENTION 79 8%ile Borderline Borderliner Remains        7  Digit Span (10/16)          8  Coding (23/89)           DELAYED MEMORY 78 7%ile Borderline Extremely Low Improvement        9  List Recall (3/10)          10  List Recognition (18/20)          11  Story Recall (10/12)          12  Figure Recall (7/20)           Sum of Index Scores:  416      Total Score:  79      Percentile: 8%ile      Classification: Borderline          TIME:   [address] OT RE-Eval  RBANS >91 minutes for administration, scoring,  interpretation, documentation, and POC development        PLANNED THERAPY INTERVENTIONS:  Internal and external memory aides  Multimatrix for saccades/ visual clutter/attention  Multi-modal environment  Sustained/alternating/divided attention  Work stations with timed transitions  Temporal Awareness: Organize the Hour activities  Memory and mental manipulation  Auditory processing with immediate recall  Memory retention with immediate and delayed recall  Spelling/reading trials    INTERVENTION COMMENTS:  Diagnosis: Memory loss [R41 3]  Precautions: Anxiety  FOTO: not warranted  Insurance: Payor: Eugune Cease / Plan: Eugune Cease / Product Type: Govt Unknown Not Listed /   5 of ______ visits, PN due 10/07/2021

## 2021-09-07 NOTE — LETTER
September 8, 2021    Elisha Collazo, [address] [facility] 312  [address] [place] [address]    Patient: Bhavik Newman   YOB: 1965   Date of Visit: 9/7/2021     Encounter Diagnosis     ICD-10-CM    1  Memory loss  R41 3        Dear Dr Kristina Cano: Thank you for your recent referral of Bhavik Newman  Please review the attached evaluation summary from Kita's recent visit  Please verify that you agree with the plan of care by signing the attached order  If you have any questions or concerns, please do not hesitate to call  I sincerely appreciate the opportunity to share in the care of one of your patients and hope to have another opportunity to work with you in the near future  Sincerely,    Jonah Davis, OT      Referring Provider:     I certify that I have read the below Plan of Care and certify the need for these services furnished under this plan of treatment while under my care  Elisha Collazo DO  [facility] 442  [address] [place] [id number]  Via Fax: [phone]         OCCUPATIONAL THERAPY INITIAL EVALUATION:      09/07/2021  Bhavik Newman  1965  [id number]  Sammie Severance, DO   Diagnosis ICD-10-CM Associated Orders   1  Memory loss  R41 3          Assessment  Assessment details: See skilled analysis for further details  SKILLED ANALYSIS:  Pt is a 61 y o  female referred to Occupational Therapy s/p Memory loss [R41 3]    Pt participated in skilled OT evaluation and following formalized testing as well as clinical observation, Pt presents with the following areas of deficit:  Decreased immediate and delayed memory retention/recall, frequent bouts of forgetfulness of detailed information, decreased sustained/alternating/divided attention in multi-modal environment with frequent distractions present to environment stimuli, delayed processing speed, increased anxiety, decreased speed of reading abilities, and agraphia affecting abilities returning to worker role and engagement in salient/leisure tasks  Pt scoring a total of extremely low on RBANS standardized assessment with biggest difficulties in immediate/delayed memory, attention, and visuospatial/constructional subsections  Pt will benefit from skilled Occupational Therapy services 1x/week for 8-10 weeks with focus on cog re-training, Pt edu on internal/external memory aides, and reading/handwriting improving speed and abilities to engage in handwriting tasks to improve on the above deficits, eventual return to worker role, and enhance overall QOL  Sherre Soulier Upon this date, Pt participated in re-evaluation to further assess fxnl progression towards Occupational Therapy goals  Pt demo with G functional progression towards goals in POC evident by improved immediate/delayed memory retention/recall with emerging improved utilization of internal/external memory aides, improved direction follow abilities for both written and verbal with less repeat in information required, improved sustained attention to task at hand in multi-modal environment with less distractions present, and improved fluidity/legibility of handwriting with less errors evident  Following formalized testing and clinical observation, Pt continues to present with the following limitation: delayed memory recall with increased anxiety levels, decreased speed of handwriting when taking notes for verbal information, increased anxiety levels regarding current limitations, and decreased dual tasking/alternating/divided attention with frequent verbal cues or prompts required  OTR recommending Pt to continue skilled OT services 1x/week for 4-6 weeks focusing on improving the above deficits, improved role performance in home environment, and eventual return to worker role functions            GOALS:    Short Term:    o Pt will increase verbal and written 1-2 step direction following with processing time of <30 seconds and 75% accuracy for improved functional performance with salient tasks 4 weeks-- MET    o Pt will demo G carryover and understanding of temporal orientation compensatory strategies and orientation of daily schedules (ie   Use of calendars, alarms, etc) for inc safety with life roles and IADL fxn 4 weeks-- PARTIALLY MET    o Pt will demo G recall of 65% of Verbal/written information utilizing memory strategy of choice for improved STM/delayed memory 4 weeks-- PARTIALLY MET    o Pt will demo G carryover of use of internal/external memory strategy aides for improved recall of daily events, improved executive functioning with 65% accuracy 4 weeks-- PARTIALLY MET    o Pt will retain/comprehend 65% of verbal and/or written information as provided to inc overall life role performance and for return to leisure task of reading 4 weeks-- MET      o Pt will maintain attention to task for 45 minutes in multimodal environment for baseline performance,  improved role performance and to improve learning and to simulate return to work environment 4 weeks-- MET    o Pt will demo ability to participate in dual tasking/divided attention task with 65% accuracy in multimodal environment to simulate return to work roles 4 weeks-- MET    o Pt will demo ability to alternate attention between 2 tasks with cog loading and 65% accuracy with G retention of task directions in multimodal environment to simulate return to work  roles 4 weeks-- MET  o Pt will demo with improved accuracy with writing x 2 sentences with <5 errors for eventual return to salient tasks of writing letters/notes 4 weeks-- PARTIALLY MET        Long - Term:   o Pt will increase verbal and written 1-2 step direction following with processing time of <15 seconds and 85% accuracy for improved functional performance with salient tasks-- PARTIALLY MET    o Pt will demo G recall of 85% of Verbal/written information utilizing memory strategy of choice for improved STM/delayed memory-- NOT MET    o Pt will demo G carryover of use of internal/external memory strategy aides for improved recall of daily events, improved executive functioning with 85% accuracy-- NOT MET    o Pt will retain/comprehend 85% of verbal and/or written information as provided to inc overall life role performance and for return to leisure task of reading-- PARTIALLY MET       o Pt will maintain attention to task for 60 minutes in multimodal environment for baseline performance,  improved role performance and to improve learning and to simulate return to work environment -- PARTIALLY MET    o Pt will demo ability to participate in dual tasking/divided attention task with 85% accuracy in multimodal environment to simulate return to work roles-- PARTIALLY MET    o Pt will demo ability to alternate attention between 2 tasks with cog loading and 85% accuracy with G retention of task directions in multimodal environment to simulate return to work  roles -- PARTIALLY MET  o Pt will demo with improved accuracy with writing x 4 sentence paragraph with <2 errors for eventual return to salient tasks of writing letters/notes-- NOT MET        Subjective Evaluation    Quality of life: fair          SUBJECTIVE: "No change  This past week hs been more stressful  I am using post its more than I used to "    PATIENT GOAL: "To be able to work again and write again "      HISTORY OF PRESENT ILLNESS:     Pt is a 61 y o  female who was referred to Occupational Therapy s/p  Memory loss [R41 3]  Pt reports having mechanical fall down the stairs in January 2020, though reports not hitting head  Pt reports no fractures, though fall resulted in a R ankle sprain  Pt with self-report of symptoms beginning approximately one year ago with initial symptoms of decreased memory retention/recall of numbers when performing working duties    Pt reports noticing mixing up "B's" and "D's" when writing beginning in December  Pt now reports ongoing decline in handwriting and spelling  Pt underwent brain MRI last Fall with results WNL  Pt has an appt scheduled with Neurology for the [place] for further assessment 2* unknown reason for the cause of present symptoms/deficits  Pt is currently seeing psychology every two weeks 2* anxiety  Pt with self-report of noticing worsening symptoms of decreased sustained/alteranting/divided attention, decreased memory retention/recall, agraphia, and frequent forgetfulness  Pt lives in a two story home with   Pt currently performing all ADLs and IADLs at independent status  Pt currently not working at this time since last October-- Pt worked as an  for [employer]  Pt continues the  role with no difficulties reported       PMH:   Past Medical History:   Diagnosis Date    Abnormal Pap smear of cervix     Anemia     Atypical nevus of scalp     last assessed 5/11/17     Basal cell carcinoma of scalp     last assessed 5/11/17     Breast lump     last assessed 5/12/14     Cancer (HCC)     BCC-left nose    Cervical polyp     Fibroid     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    Leukopenia     Menorrhagia     last assessed 5/12/14     Metrorrhagia     last assessed 10/22/15     Ovarian cyst     left    Urinary tract infection        Past Surgical Hx:   Past Surgical History:   Procedure Laterality Date    BREAST BIOPSY Right 2011    percutan needle core use imag guide stereotactic / benign    BREAST SURGERY Right     bx    COLONOSCOPY      NE EXC SKIN BENIG >4 CM REMAINDR BODY N/A 1/30/2017    Procedure: SCALP SUSPICIOUS LESION EXCISION;  Surgeon: Marilee Walker MD;  [facility];  Service: Plastics    NE RECMPL WND SCALP,EXTR 2 6-7 5 CM N/A 1/30/2017    Procedure: COMPLEX CLOSURE;  Surgeon: Marilee Walker MD;  [facility];  Service: Plastics    SKIN BIOPSY      basal cell ca    SKIN CANCER EXCISION nose          Pain Levels:      Restin    With Activity:  3    Pain in R foot 2* Plantar fasciatus  Objective     Functional Assessment        Comments  See impairment section for further details  IMPAIRMENTS SECTION:    Assessments  The Repeatable Battery for the Assessment of Neuropsychological Status (RBANS) is a brief, individually-administered assessment which measures attention, language, visuospatial/constructional abilities, and immediate & delayed memory  The RBANS is intended for use with adolescents to adults, ages 15 to 80 years  The following results were obtained during the administration of the assessment  Form: D    Cognitive Domain/Subtest: Index Score: Percentile Rank: Classification: IE: Status:   IMMEDIATE MEMORY 94 34%ile Average Borderline Significant improvement        1  List Learning ()          2  Story Memory ()           VISUOSPATIAL/  CONSTRUCTIONAL 75 5%ile Borderline  Borderline Remains        3  Figure Copy (15/20)          4  Line Orientation ()           LANGUAGE 90 25%ile Low Average Low Average Slight improvement        5  Picture Naming (10/10)          6  Semantic Fluency ()           ATTENTION 79 8%ile Borderline Borderliner Remains        7  Digit Span (10/16)          8  Coding ()           DELAYED MEMORY 78 7%ile Borderline Extremely Low Improvement        9  List Recall (3/10)          10  List Recognition ()          11  Story Recall (10/12)          12  Figure Recall ()           Sum of Index Scores:  416      Total Score:  79      Percentile: 8%ile      Classification: Borderline          TIME:   [address] OT RE-Eval  RBANS >91 minutes for administration, scoring,  interpretation, documentation, and POC development        PLANNED THERAPY INTERVENTIONS:  Internal and external memory aides  Multimatrix for saccades/ visual clutter/attention  Multi-modal environment  Sustained/alternating/divided attention  Work stations with timed transitions  Temporal Awareness: Organize the Hour activities  Memory and mental manipulation  Auditory processing with immediate recall  Memory retention with immediate and delayed recall  Spelling/reading trials    INTERVENTION COMMENTS:  Diagnosis: Memory loss [R41 3]  Precautions: Anxiety  FOTO: not warranted  Insurance: Payor: Ann Breath / Plan: Ann Breath / Product Type: Govt Unknown Not Listed /   5 of ______ visits, PN due 10/07/2021

## 2021-09-14 ENCOUNTER — APPOINTMENT (OUTPATIENT)
Dept: OCCUPATIONAL THERAPY | Facility: REHABILITATION | Age: 56
End: 2021-09-14
Payer: OTHER GOVERNMENT

## 2021-09-17 ENCOUNTER — OFFICE VISIT (OUTPATIENT)
Dept: OCCUPATIONAL THERAPY | Facility: REHABILITATION | Age: 56
End: 2021-09-17
Payer: OTHER GOVERNMENT

## 2021-09-17 DIAGNOSIS — R41.3 MEMORY LOSS: Primary | ICD-10-CM

## 2021-09-17 PROCEDURE — 97530 THERAPEUTIC ACTIVITIES: CPT

## 2021-09-17 NOTE — PROGRESS NOTES
Occupational Therapy Daily Note:    Today's date: 2021  Patient name: Ruiz Liz  : 1965  MRN: [id number]  Referring provider: Javier Foss, DO  Dx:   Encounter Diagnosis   Name Primary?  Memory loss Yes                  Subjective: Pt reports things are not going well at home with memory  Pt reports using more sticky notes than usual and used association internal memory strategy during testing last week  Objective:   Pt reports starting a new medication, brought name on paper: Escitalopram 5mg  Pt reports that she feels like it makes her more anxious, but is willing to trial it longer  Pt also reports that it may be part of the reason she is having difficulty sleeping again  Pt began session with Organize the Hour activity, requiring pt to sequence steps, recall past work role tasks, increase awareness to current facility / location / holiday's, increasing use of computer screen time, naming/recall of items, magazine use for obtaining coupons to increase attention, memory, recall of events, awareness, direction following for ADLs/IADLs/leisure tasks  Assessment: Tolerated treatment well  Pt provided with directions for organize the hour with MIN difficulty sequencing before starting task  When pt started 1st task, OTR provided directions, pt then questioned directions x1 2* memory recall  Pt req 1 cue for current timing for completion of one organize the hour task  When listing holidays in order, pt req 2 cues for awareness of holiday and month relation  Pt able to name 10 items that are blue requiring cues for last 5  Pt had MIN-MOD difficulty with using laptop for finding recipe, then writing directions down on paper with 2 mistakes and increased time to complete  Throughout task, pt required directional assistance and reassurance during tasks  Patient would benefit from continued skilled OT  Plan: Continued skilled OT per POC      INTERVENTION COMMENTS:  Diagnosis: Memory loss [R41 3]  Precautions: Anxiety  FOTO: not warranted  Insurance: Payor: Elver Dickinson / Plan: Elver Dickinson / Product Type: Govt Unknown Not Listed /   6 of ______ visits, PN due 10/07/2021

## 2021-09-20 ENCOUNTER — OFFICE VISIT (OUTPATIENT)
Dept: PODIATRY | Facility: CLINIC | Age: 56
End: 2021-09-20
Payer: OTHER GOVERNMENT

## 2021-09-20 VITALS
BODY MASS INDEX: 20.38 KG/M2 | HEART RATE: 61 BPM | WEIGHT: 145.6 LBS | SYSTOLIC BLOOD PRESSURE: 103 MMHG | DIASTOLIC BLOOD PRESSURE: 63 MMHG | HEIGHT: 71 IN

## 2021-09-20 DIAGNOSIS — M72.2 PLANTAR FASCIITIS: Primary | ICD-10-CM

## 2021-09-20 DIAGNOSIS — S93.401A SPRAIN OF RIGHT ANKLE, UNSPECIFIED LIGAMENT, INITIAL ENCOUNTER: ICD-10-CM

## 2021-09-20 DIAGNOSIS — G62.9 PERIPHERAL POLYNEUROPATHY: ICD-10-CM

## 2021-09-20 DIAGNOSIS — G57.53 TARSAL TUNNEL SYNDROME OF BOTH LOWER EXTREMITIES: ICD-10-CM

## 2021-09-20 PROCEDURE — 99214 OFFICE O/P EST MOD 30 MIN: CPT | Performed by: PODIATRIST

## 2021-09-20 RX ORDER — ESCITALOPRAM OXALATE 5 MG/1
15 TABLET ORAL DAILY
COMMUNITY

## 2021-09-20 NOTE — PROGRESS NOTES
PATIENT:  Romina Her  1965       ASSESSMENT:     1  Plantar fasciitis     2  Sprain of right ankle, unspecified ligament, initial encounter     3  Peripheral polyneuropathy     4  Tarsal tunnel syndrome of both lower extremities               PLAN:  1  Patient was counseled and educated on the condition and the diagnosis  2  The last office note was reviewed  Reviewed / discussed images  3  The diagnosis, treatment options and prognosis were discussed with the patient  4   Ankle pain has been better  She continues to have plantar fascial pain  5   She is not amenable for injection  Instructed supportive care, home exercise, icing, and proper footwear/ arch support  Possible injection if her pain gets worse  6   Monitor tarsal tunnel syndrome / possible neuropathy  7   RA in 6 weeks  Subjective:     HPI  The patient presents for chief complaint of right foot pain  She had ankle sprain and heel pain last year  She completed PT/OT after the last visit  Right ankle pain has been minimal   She still has some pain on right plantar foot  Pain is 4-5 out of 10  Some post static dyskinesia in the morning  Pain increases with activity and depending on the serface she walks  She has some pain on right heel and ankle  Pain has been better, but is still 5 out of 10  No numbness or weakness  No edema  She can walk 2 city blocks before she needs to rest because some discomfort in her foot  The following portions of the patient's history were reviewed and updated as appropriate: allergies, current medications, past family history, past medical history, past social history, past surgical history and problem list   All pertinent labs and images were reviewed        Past Medical History  Past Medical History:   Diagnosis Date    Abnormal Pap smear of cervix     Anemia     Atypical nevus of scalp     last assessed 5/11/17     Basal cell carcinoma of scalp     last assessed 5/11/17     Breast lump     last assessed 5/12/14     Cancer (HCC)     BCC-left nose    Cervical polyp     Fibroid     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    Leukopenia     Menorrhagia     last assessed 5/12/14     Metrorrhagia     last assessed 10/22/15     Ovarian cyst     left    Urinary tract infection        Past Surgical History  Past Surgical History:   Procedure Laterality Date    BREAST BIOPSY Right 2011    percutan needle core use imag guide stereotactic / benign    BREAST SURGERY Right     bx    COLONOSCOPY      MS EXC SKIN BENIG >4 CM REMAINDR BODY N/A 1/30/2017    Procedure: SCALP SUSPICIOUS LESION EXCISION;  Surgeon: Ralph Peña MD;  [facility];  Service: Plastics    MS RECMPL WND SCALP,EXTR 2 6-7 5 CM N/A 1/30/2017    Procedure: COMPLEX CLOSURE;  Surgeon: Ralph Peña MD;  [facility];  Service: Plastics    SKIN BIOPSY      basal cell ca    SKIN CANCER EXCISION      nose        Allergies:  Patient has no known allergies  Medications:  Current Outpatient Medications   Medication Sig Dispense Refill    ascorbic acid (VITAMIN C) 250 MG tablet Take 250 mg by mouth      Diclofenac Sodium (VOLTAREN) 1 % APPLY 2 GM ON AFFECTED AREA TWICE A DAY      ECHINACEA PO Take by mouth      escitalopram (LEXAPRO) 5 mg tablet Take 5 mg by mouth daily      LORazepam (ATIVAN) 0 5 mg tablet TAKE ONE TABLET BY MOUTH AS DIRECTED BY PROVIDER ONE HOUR PRIOR TO MRA OF BRAIN      Magnesium Hydroxide (MAGNESIA PO) Take by mouth      Multiple Vitamin (MULTIVITAMIN) tablet Take 1 tablet by mouth daily       No current facility-administered medications for this visit         Social History:  Social History     Socioeconomic History    Marital status: /Civil Union     Spouse name: None    Number of children: None    Years of education: None    Highest education level: None   Occupational History    Occupation:     Tobacco Use    Smoking status: Never Smoker    Smokeless tobacco: Never Used   Vaping Use    Vaping Use: Never used   Substance and Sexual Activity    Alcohol use: No     Comment: social drinker per [employer]script     Drug use: No    Sexual activity: Yes     Birth control/protection: Spermicide   Other Topics Concern    None   Social History Narrative    Caffeine use     Ad[employer] affiliation [employer]     Uses safety equipment seatbelts      Social Determinants of Health     Financial Resource Strain: Low Risk     Difficulty of Paying Living Expenses: Not very hard   Food Insecurity: No Food Insecurity    Worried About Running Out of Food in the Last Year: Never true    Ritchie of Food in the Last Year: Never true   Transportation Needs: No Transportation Needs    Lack of Transportation (Medical): No    Lack of Transportation (Non-Medical): No   Physical Activity: Inactive    Days of Exercise per Week: 0 days    Minutes of Exercise per Session: 0 min   Stress: Stress Concern Present    Feeling of Stress : Very much   Social Connections: Moderately Isolated    Frequency of Communication with Friends and Family: Once a week    Frequency of Social Gatherings with Friends and Family: Once a week    Attends [employer] Services: More than 4 times per year    Active Member of [employer] or Organizations: No    Attends Club or Organization Meetings: Never    Marital Status:    Intimate Partner Violence: Not At Risk    Fear of Current or Ex-Partner: No    Emotionally Abused: No    Physically Abused: No    Sexually Abused: No          Review of Systems   Constitutional: Negative for chills and fever  Respiratory: Negative for cough and shortness of breath  Cardiovascular: Negative for chest pain  Gastrointestinal: Negative for diarrhea, nausea and vomiting  Musculoskeletal: Negative for joint swelling  Skin: Negative for wound  Neurological: Negative for weakness  Hematological: Negative  Objective:      /63   Pulse 61   Ht 5' 10 5" (1 791 m) Comment: verbal  Wt 66 kg (145 lb 9 6 oz)   LMP  (LMP Unknown)   BMI 20 60 kg/m²          Physical Exam  Vitals reviewed  Constitutional:       General: She is not in acute distress  Appearance: She is well-developed  She is not ill-appearing or toxic-appearing  HENT:      Head: Normocephalic and atraumatic  Cardiovascular:      Rate and Rhythm: Normal rate and regular rhythm  Pulses: Normal pulses  Dorsalis pedis pulses are 2+ on the right side and 2+ on the left side  Posterior tibial pulses are 2+ on the right side and 2+ on the left side  Pulmonary:      Effort: Pulmonary effort is normal  No respiratory distress  Musculoskeletal:         General: Tenderness present  No swelling or signs of injury  Normal range of motion  Cervical back: Normal range of motion and neck supple  Right lower leg: No edema  Left lower leg: No edema  Right foot: No foot drop  Left foot: No foot drop  Comments: Minimal pain right lateral ankle  No edema right lateral ankle  Right ankle ROM is WNL without significant pain  Minimal pain around right ATFL  Minimal pain presents around peroneal tendon right ankle  Mild tenderness plantar right heel  Positive Tinel sign around right zahraa pedis  Feet:      Right foot:      Protective Sensation: 10 sites tested  10 sites sensed  Left foot:      Protective Sensation: 10 sites tested  10 sites sensed  Skin:     General: Skin is warm  Capillary Refill: Capillary refill takes less than 2 seconds  Coloration: Skin is not cyanotic or mottled  Findings: No abscess, ecchymosis, erythema, petechiae, rash or wound  Nails: There is no clubbing  Neurological:      General: No focal deficit present  Mental Status: She is alert and oriented to person, place, and time  Cranial Nerves: No cranial nerve deficit        Sensory: No sensory deficit  Motor: No weakness  Coordination: Coordination normal       Deep Tendon Reflexes: Reflexes normal    Psychiatric:         Mood and Affect: Mood normal          Behavior: Behavior normal          Thought Content:  Thought content normal          Judgment: Judgment normal

## 2021-09-21 ENCOUNTER — OFFICE VISIT (OUTPATIENT)
Dept: OCCUPATIONAL THERAPY | Facility: REHABILITATION | Age: 56
End: 2021-09-21
Payer: OTHER GOVERNMENT

## 2021-09-21 DIAGNOSIS — R41.3 MEMORY LOSS: Primary | ICD-10-CM

## 2021-09-21 PROCEDURE — 97530 THERAPEUTIC ACTIVITIES: CPT

## 2021-09-21 NOTE — PROGRESS NOTES
OTR informed today that patient had cancelled all future OT appointments moving forward (after session)  OT called patient, reports that she feels like OT is not really helping her and having more than one hour of a drive is challenging  She also reports that even though her MRI results did not demonstrate a specific diagnosis and OT is providing pt with exercises and worksheets to complete at home, that she can do these things on her own  Pt was educated on OT process and changes that occur may not be linear or happen quickly but that she may notice improvements over time  Pt would like to be on a 30 day hold at this time

## 2021-09-21 NOTE — PROGRESS NOTES
Occupational Therapy Daily Note:    Today's date: 2021  Patient name: Artemio Nesbitt  : 1965  MRN: [id number]  Referring provider: Humza Chopra, DO  Dx:   Encounter Diagnosis   Name Primary?  Memory loss Yes                  Subjective: Pt reports reading over the weekend for 10-15 min, unable to recall book name  Pt also reports difficulty getting up, combing hair, making food/breakfast and on going for ~ 9 mo  Pt continues to also report new medication and not knowing if it is doing "anything good," physician reported to pt to increase medication to 10 mg yesterday  Objective: Pt seen for OT treatment session focusing on sustained attention, dividing attention, immediate memory recall, direction following for dual-cog tasks to improve the above for everyday occupational performance  Pt engaged in a various tasks, starting with writing out 50 states in alphabetical order from memory then with having a visual  Pt then transitioned to task while ambulating req to follow provided oral directions or select correct answer out of 3-5 options provided  Pt concluded session writing forward and reverse order of increasing demands of numbers (starting at 2 to 6) with a 3 sec delay after verbally provided numbers  Assessment: Tolerated treatment well  Provided pt with visual list after 10 min of writing states from memory 2* difficulties recalling and pt report of difficulties placing in correct sequence  Pt had recalled and wrote out 12 states prior to providing visual  During listing of states, pt attempted to write out 'O' states when previously completed, pt able to find that this occurred without therapist cues  Pt correctly answered 11 5/14 verbal questions/answers while ambulating in mild sensory environment for increased distraction  Pt had MAX difficulty answering question pertaining to mental math manipulation in addition to repeating 10 digit phone # sequence    Pt reports with increased options/answer choices to choose from, more of a challenge to correctly answer question  Lastly, pt wrote 12/14 forward # sequences correctly on board and 7/8 backward # sequences correctly  Pt reports more of a challenge with writing numbers backwards 2* mental manipulation difficulties (with carry out)  Patient would benefit from continued skilled OT  Plan: Continued skilled OT per POC      INTERVENTION COMMENTS:  Diagnosis: Memory loss [R41 3]  Precautions: Anxiety  FOTO: not warranted  Insurance: Payor: Domenico Lucas / Plan: Domenico Lucas / Product Type: Govt Unknown Not Listed /   7 of ______ visits, PN due 10/07/2021

## 2021-09-28 ENCOUNTER — APPOINTMENT (OUTPATIENT)
Dept: OCCUPATIONAL THERAPY | Facility: REHABILITATION | Age: 56
End: 2021-09-28
Payer: OTHER GOVERNMENT

## 2021-10-01 ENCOUNTER — APPOINTMENT (OUTPATIENT)
Dept: OCCUPATIONAL THERAPY | Facility: REHABILITATION | Age: 56
End: 2021-10-01
Payer: OTHER GOVERNMENT

## 2021-10-05 ENCOUNTER — OFFICE VISIT (OUTPATIENT)
Dept: OCCUPATIONAL THERAPY | Facility: REHABILITATION | Age: 56
End: 2021-10-05
Payer: OTHER GOVERNMENT

## 2021-10-05 DIAGNOSIS — R41.3 MEMORY LOSS: Primary | ICD-10-CM

## 2021-10-05 PROCEDURE — 97530 THERAPEUTIC ACTIVITIES: CPT

## 2021-10-12 ENCOUNTER — EVALUATION (OUTPATIENT)
Dept: OCCUPATIONAL THERAPY | Facility: REHABILITATION | Age: 56
End: 2021-10-12
Payer: OTHER GOVERNMENT

## 2021-10-12 DIAGNOSIS — R41.3 MEMORY LOSS: Primary | ICD-10-CM

## 2021-10-12 PROCEDURE — 97168 OT RE-EVAL EST PLAN CARE: CPT

## 2021-10-12 PROCEDURE — 96125 COGNITIVE TEST BY HC PRO: CPT

## 2021-10-12 PROCEDURE — 97530 THERAPEUTIC ACTIVITIES: CPT

## 2021-10-26 ENCOUNTER — OFFICE VISIT (OUTPATIENT)
Dept: OCCUPATIONAL THERAPY | Facility: REHABILITATION | Age: 56
End: 2021-10-26
Payer: OTHER GOVERNMENT

## 2021-10-26 DIAGNOSIS — R41.3 MEMORY LOSS: Primary | ICD-10-CM

## 2021-10-26 PROCEDURE — 97530 THERAPEUTIC ACTIVITIES: CPT

## 2021-11-01 ENCOUNTER — OFFICE VISIT (OUTPATIENT)
Dept: PODIATRY | Facility: CLINIC | Age: 56
End: 2021-11-01
Payer: OTHER GOVERNMENT

## 2021-11-01 VITALS
SYSTOLIC BLOOD PRESSURE: 100 MMHG | DIASTOLIC BLOOD PRESSURE: 66 MMHG | WEIGHT: 145.2 LBS | HEIGHT: 71 IN | HEART RATE: 73 BPM | BODY MASS INDEX: 20.33 KG/M2

## 2021-11-01 DIAGNOSIS — M72.2 PLANTAR FASCIITIS: Primary | ICD-10-CM

## 2021-11-01 DIAGNOSIS — G62.9 PERIPHERAL POLYNEUROPATHY: ICD-10-CM

## 2021-11-01 PROCEDURE — 20550 NJX 1 TENDON SHEATH/LIGAMENT: CPT | Performed by: PODIATRIST

## 2021-11-01 PROCEDURE — 99213 OFFICE O/P EST LOW 20 MIN: CPT | Performed by: PODIATRIST

## 2021-11-02 ENCOUNTER — OFFICE VISIT (OUTPATIENT)
Dept: OCCUPATIONAL THERAPY | Facility: REHABILITATION | Age: 56
End: 2021-11-02
Payer: OTHER GOVERNMENT

## 2021-11-02 DIAGNOSIS — R41.3 MEMORY LOSS: Primary | ICD-10-CM

## 2021-11-02 PROCEDURE — 97530 THERAPEUTIC ACTIVITIES: CPT

## 2021-11-10 ENCOUNTER — TELEPHONE (OUTPATIENT)
Dept: PODIATRY | Facility: CLINIC | Age: 56
End: 2021-11-10

## 2021-11-10 ENCOUNTER — OFFICE VISIT (OUTPATIENT)
Dept: OCCUPATIONAL THERAPY | Facility: REHABILITATION | Age: 56
End: 2021-11-10
Payer: OTHER GOVERNMENT

## 2021-11-10 DIAGNOSIS — R41.3 MEMORY LOSS: Primary | ICD-10-CM

## 2021-11-10 PROCEDURE — 97530 THERAPEUTIC ACTIVITIES: CPT

## 2021-11-15 ENCOUNTER — TELEPHONE (OUTPATIENT)
Dept: PODIATRY | Facility: CLINIC | Age: 56
End: 2021-11-15

## 2021-11-16 ENCOUNTER — APPOINTMENT (OUTPATIENT)
Dept: OCCUPATIONAL THERAPY | Facility: REHABILITATION | Age: 56
End: 2021-11-16
Payer: OTHER GOVERNMENT

## 2021-11-18 ENCOUNTER — OFFICE VISIT (OUTPATIENT)
Dept: OCCUPATIONAL THERAPY | Facility: REHABILITATION | Age: 56
End: 2021-11-18
Payer: OTHER GOVERNMENT

## 2021-11-18 DIAGNOSIS — R41.3 MEMORY LOSS: Primary | ICD-10-CM

## 2021-11-18 PROCEDURE — 97530 THERAPEUTIC ACTIVITIES: CPT

## 2021-11-23 ENCOUNTER — EVALUATION (OUTPATIENT)
Dept: OCCUPATIONAL THERAPY | Facility: REHABILITATION | Age: 56
End: 2021-11-23
Payer: OTHER GOVERNMENT

## 2021-11-23 DIAGNOSIS — R41.3 MEMORY LOSS: Primary | ICD-10-CM

## 2021-11-23 PROCEDURE — 96125 COGNITIVE TEST BY HC PRO: CPT

## 2021-11-23 PROCEDURE — 97530 THERAPEUTIC ACTIVITIES: CPT

## 2021-11-30 ENCOUNTER — APPOINTMENT (OUTPATIENT)
Dept: OCCUPATIONAL THERAPY | Facility: REHABILITATION | Age: 56
End: 2021-11-30
Payer: OTHER GOVERNMENT

## 2021-12-15 ENCOUNTER — OFFICE VISIT (OUTPATIENT)
Dept: PODIATRY | Facility: CLINIC | Age: 56
End: 2021-12-15
Payer: OTHER GOVERNMENT

## 2021-12-15 VITALS
WEIGHT: 145 LBS | HEART RATE: 65 BPM | SYSTOLIC BLOOD PRESSURE: 102 MMHG | HEIGHT: 71 IN | DIASTOLIC BLOOD PRESSURE: 70 MMHG | BODY MASS INDEX: 20.3 KG/M2

## 2021-12-15 DIAGNOSIS — G57.51 TARSAL TUNNEL SYNDROME, RIGHT: ICD-10-CM

## 2021-12-15 DIAGNOSIS — M72.2 PLANTAR FASCIITIS: Primary | ICD-10-CM

## 2021-12-15 PROCEDURE — 99213 OFFICE O/P EST LOW 20 MIN: CPT | Performed by: PODIATRIST

## 2021-12-20 ENCOUNTER — TELEPHONE (OUTPATIENT)
Dept: OBGYN CLINIC | Facility: HOSPITAL | Age: 56
End: 2021-12-20

## 2022-01-03 ENCOUNTER — TELEPHONE (OUTPATIENT)
Dept: PODIATRY | Facility: CLINIC | Age: 57
End: 2022-01-03

## 2022-01-03 NOTE — TELEPHONE ENCOUNTER
Zoraida Snow, let her know that the paperwork has been filled, and number 8 was changed from "No" to "Yes"  She asked if I could mail it to her to address: [address]  [address] [place], [address] [address]    Sent, and re-scanned into system under "registration" by CALEB

## 2023-03-07 ENCOUNTER — HOSPITAL ENCOUNTER (OUTPATIENT)
Dept: MAMMOGRAPHY | Facility: CLINIC | Age: 58
Discharge: HOME | End: 2023-03-07

## 2023-03-07 VITALS — HEIGHT: 71 IN | WEIGHT: 145.06 LBS | BODY MASS INDEX: 20.31 KG/M2

## 2023-03-07 DIAGNOSIS — Z12.31 VISIT FOR SCREENING MAMMOGRAM: ICD-10-CM

## (undated) DEVICE — GLOVE INDICATOR PI UNDERGLOVE SZ 8 BLUE

## (undated) DEVICE — REM POLYHESIVE ADULT PATIENT RETURN ELECTRODE: Brand: VALLEYLAB

## (undated) DEVICE — NEEDLE 25G X 1 1/2

## (undated) DEVICE — SKIN MARKER DUAL TIP WITH RULER CAP, FLEXIBLE RULER AND LABELS: Brand: DEVON

## (undated) DEVICE — POV-IOD SWAB STICKS

## (undated) DEVICE — GLOVE SRG BIOGEL ECLIPSE 7.5

## (undated) DEVICE — SUT MONOCRYL 4-0 PS-2 27 IN Y426H

## (undated) DEVICE — STRL UNIVERSAL OUTPATIENT PACK: Brand: CARDINAL HEALTH

## (undated) DEVICE — GLOVE SRG BIOGEL 7.5

## (undated) DEVICE — 3M™ TEGADERM™ TRANSPARENT FILM DRESSING FRAME STYLE, 1624W, 2-3/8 IN X 2-3/4 IN (6 CM X 7 CM), 100/CT 4CT/CASE: Brand: 3M™ TEGADERM™

## (undated) DEVICE — INTENDED FOR TISSUE SEPARATION, AND OTHER PROCEDURES THAT REQUIRE A SHARP SURGICAL BLADE TO PUNCTURE OR CUT.: Brand: BARD-PARKER SAFETY BLADES SIZE 15, STERILE

## (undated) DEVICE — NEEDLE 27 G X 1 1/4

## (undated) DEVICE — PROXIMATE PLUS MD MULTI-DIRECTIONAL RELEASE SKIN STAPLERS CONTAINS 35 STAINLESS STEEL STAPLES APPROXIMATE CLOSED DIMENSIONS: 6.9MM X 3.9MM WIDE: Brand: PROXIMATE